# Patient Record
Sex: FEMALE | Race: WHITE | NOT HISPANIC OR LATINO
[De-identification: names, ages, dates, MRNs, and addresses within clinical notes are randomized per-mention and may not be internally consistent; named-entity substitution may affect disease eponyms.]

---

## 2017-12-14 ENCOUNTER — RESULT REVIEW (OUTPATIENT)
Age: 42
End: 2017-12-14

## 2017-12-15 ENCOUNTER — OUTPATIENT (OUTPATIENT)
Dept: OUTPATIENT SERVICES | Facility: HOSPITAL | Age: 42
LOS: 1 days | End: 2017-12-15
Payer: COMMERCIAL

## 2017-12-15 ENCOUNTER — APPOINTMENT (OUTPATIENT)
Dept: ULTRASOUND IMAGING | Facility: CLINIC | Age: 42
End: 2017-12-15
Payer: COMMERCIAL

## 2017-12-15 DIAGNOSIS — Z00.8 ENCOUNTER FOR OTHER GENERAL EXAMINATION: ICD-10-CM

## 2017-12-15 PROCEDURE — 76942 ECHO GUIDE FOR BIOPSY: CPT | Mod: 26

## 2017-12-15 PROCEDURE — 60100 BIOPSY OF THYROID: CPT

## 2017-12-15 PROCEDURE — 76942 ECHO GUIDE FOR BIOPSY: CPT

## 2017-12-15 PROCEDURE — 88173 CYTOPATH EVAL FNA REPORT: CPT | Mod: 26

## 2017-12-15 PROCEDURE — 88173 CYTOPATH EVAL FNA REPORT: CPT

## 2018-10-16 ENCOUNTER — RX RENEWAL (OUTPATIENT)
Age: 43
End: 2018-10-16

## 2018-11-12 ENCOUNTER — RECORD ABSTRACTING (OUTPATIENT)
Age: 43
End: 2018-11-12

## 2018-11-13 ENCOUNTER — APPOINTMENT (OUTPATIENT)
Dept: ENDOCRINOLOGY | Facility: CLINIC | Age: 43
End: 2018-11-13
Payer: COMMERCIAL

## 2018-11-13 VITALS
WEIGHT: 124 LBS | BODY MASS INDEX: 24.35 KG/M2 | DIASTOLIC BLOOD PRESSURE: 80 MMHG | SYSTOLIC BLOOD PRESSURE: 110 MMHG | HEART RATE: 80 BPM | HEIGHT: 60 IN

## 2018-11-13 PROCEDURE — 99214 OFFICE O/P EST MOD 30 MIN: CPT

## 2019-02-27 ENCOUNTER — APPOINTMENT (OUTPATIENT)
Dept: OBGYN | Facility: CLINIC | Age: 44
End: 2019-02-27

## 2019-04-15 ENCOUNTER — APPOINTMENT (OUTPATIENT)
Dept: OBGYN | Facility: CLINIC | Age: 44
End: 2019-04-15
Payer: COMMERCIAL

## 2019-04-15 ENCOUNTER — TRANSCRIPTION ENCOUNTER (OUTPATIENT)
Age: 44
End: 2019-04-15

## 2019-04-15 ENCOUNTER — LABORATORY RESULT (OUTPATIENT)
Age: 44
End: 2019-04-15

## 2019-04-15 VITALS
WEIGHT: 130 LBS | BODY MASS INDEX: 25.52 KG/M2 | HEIGHT: 60 IN | SYSTOLIC BLOOD PRESSURE: 116 MMHG | DIASTOLIC BLOOD PRESSURE: 70 MMHG

## 2019-04-15 DIAGNOSIS — N93.0 POSTCOITAL AND CONTACT BLEEDING: ICD-10-CM

## 2019-04-15 LAB
BILIRUB UR QL STRIP: NORMAL
GLUCOSE UR-MCNC: NORMAL
HCG UR QL: 0.2 EU/DL
HCG UR QL: NEGATIVE
HGB UR QL STRIP.AUTO: NORMAL
KETONES UR-MCNC: NORMAL
LEUKOCYTE ESTERASE UR QL STRIP: NORMAL
NITRITE UR QL STRIP: NORMAL
PH UR STRIP: 6.5
PROT UR STRIP-MCNC: NORMAL
QUALITY CONTROL: YES
SP GR UR STRIP: 1.01

## 2019-04-15 PROCEDURE — 99386 PREV VISIT NEW AGE 40-64: CPT

## 2019-04-15 PROCEDURE — 99203 OFFICE O/P NEW LOW 30 MIN: CPT | Mod: 25

## 2019-04-15 PROCEDURE — 81003 URINALYSIS AUTO W/O SCOPE: CPT | Mod: QW

## 2019-04-15 PROCEDURE — 81025 URINE PREGNANCY TEST: CPT

## 2019-04-15 NOTE — COUNSELING
[Breast Self Exam] : breast self exam [Nutrition] : nutrition [Exercise] : exercise [STD (testing, results, tx)] : STD (testing, results, tx)

## 2019-04-16 ENCOUNTER — FORM ENCOUNTER (OUTPATIENT)
Age: 44
End: 2019-04-16

## 2019-04-16 LAB
BASOPHILS # BLD AUTO: 0.06 K/UL
BASOPHILS NFR BLD AUTO: 0.3 %
C TRACH RRNA SPEC QL NAA+PROBE: NOT DETECTED
EOSINOPHIL # BLD AUTO: 0.1 K/UL
EOSINOPHIL NFR BLD AUTO: 0.6 %
ESTRADIOL SERPL-MCNC: 470 PG/ML
FSH SERPL-MCNC: 5.5 IU/L
HCT VFR BLD CALC: 42.8 %
HGB BLD-MCNC: 12.8 G/DL
HPV HIGH+LOW RISK DNA PNL CVX: NOT DETECTED
IMM GRANULOCYTES NFR BLD AUTO: 0.5 %
LH SERPL-ACNC: 9.3 IU/L
LYMPHOCYTES # BLD AUTO: 5.81 K/UL
LYMPHOCYTES NFR BLD AUTO: 33.1 %
MAN DIFF?: NORMAL
MCHC RBC-ENTMCNC: 29.3 PG
MCHC RBC-ENTMCNC: 29.9 GM/DL
MCV RBC AUTO: 97.9 FL
MONOCYTES # BLD AUTO: 0.94 K/UL
MONOCYTES NFR BLD AUTO: 5.4 %
N GONORRHOEA RRNA SPEC QL NAA+PROBE: NOT DETECTED
NEUTROPHILS # BLD AUTO: 10.53 K/UL
NEUTROPHILS NFR BLD AUTO: 60.1 %
PLATELET # BLD AUTO: 642 K/UL
PROLACTIN SERPL-MCNC: 11.8 NG/ML
RBC # BLD: 4.37 M/UL
RBC # FLD: 13.6 %
SOURCE AMPLIFICATION: NORMAL
SOURCE TP AMPLIFICATION: NORMAL
T VAGINALIS RRNA SPEC QL NAA+PROBE: NOT DETECTED
TSH SERPL-ACNC: 0.1 UIU/ML
WBC # FLD AUTO: 17.53 K/UL

## 2019-04-16 NOTE — HISTORY OF PRESENT ILLNESS
[Monogamous] : is monogamous [Definite:  ___ (Date)] : the last menstrual period was [unfilled] [Menarche Age: ____] : age at menarche was [unfilled] [Partner Vasectomy] : has a partner with a vasectomy [Last Mammogram ___] : Last Mammogram was [unfilled] [Last Bone Density ___] : Last bone density studies [unfilled] [Reproductive Age] : is of reproductive age [Last Pap ___] : Last cervical pap smear was [unfilled] [Menstrual Problems] : reports abnormal menses [Pregnancy History] : pregnancy history: [Sexually Active] : is sexually active [Contraception] : does not use contraception

## 2019-04-16 NOTE — PHYSICAL EXAM
[Awake] : awake [Alert] : alert [Soft] : soft [Oriented x3] : oriented to person, place, and time [Normal] : uterus [Polyp ___ cm] : had a [unfilled] ~Ucm polyp [Uterine Adnexae] : were not tender and not enlarged [Mass] : no breast mass [Axillary LAD] : no axillary lymphadenopathy [Nipple Discharge] : no nipple discharge [Tender] : non tender [Distended] : not distended [Tenderness] : nontender [Motion Tenderness] : there was no cervical motion tenderness [Enlarged ___ wks] : not enlarged [Mass ___ cm] : no uterine mass was palpated [Adnexa Tenderness] : were not tender [Ovarian Mass (___ Cm)] : there were no adnexal masses

## 2019-04-16 NOTE — DISCUSSION/SUMMARY
[FreeTextEntry1] : 1. polyp seen on exam.  she will have a pelvic sono and return for discussion of results.  We discussed she will likely need a hysteroscopy and endometrial evaluation/will discuss pending the results of the sonogram. \par 2. hormone levels drawn\par 3. mammogram rx given\par \par

## 2019-04-17 ENCOUNTER — OUTPATIENT (OUTPATIENT)
Dept: OUTPATIENT SERVICES | Facility: HOSPITAL | Age: 44
LOS: 1 days | End: 2019-04-17
Payer: COMMERCIAL

## 2019-04-17 ENCOUNTER — APPOINTMENT (OUTPATIENT)
Dept: ULTRASOUND IMAGING | Facility: CLINIC | Age: 44
End: 2019-04-17
Payer: COMMERCIAL

## 2019-04-17 ENCOUNTER — APPOINTMENT (OUTPATIENT)
Dept: MAMMOGRAPHY | Facility: CLINIC | Age: 44
End: 2019-04-17
Payer: COMMERCIAL

## 2019-04-17 DIAGNOSIS — E04.2 NONTOXIC MULTINODULAR GOITER: ICD-10-CM

## 2019-04-17 DIAGNOSIS — Z12.31 ENCOUNTER FOR SCREENING MAMMOGRAM FOR MALIGNANT NEOPLASM OF BREAST: ICD-10-CM

## 2019-04-17 PROCEDURE — 77067 SCR MAMMO BI INCL CAD: CPT | Mod: 26

## 2019-04-17 PROCEDURE — 77067 SCR MAMMO BI INCL CAD: CPT

## 2019-04-17 PROCEDURE — 77063 BREAST TOMOSYNTHESIS BI: CPT | Mod: 26

## 2019-04-17 PROCEDURE — 77063 BREAST TOMOSYNTHESIS BI: CPT

## 2019-04-17 PROCEDURE — 76536 US EXAM OF HEAD AND NECK: CPT | Mod: 26

## 2019-04-17 PROCEDURE — 76536 US EXAM OF HEAD AND NECK: CPT

## 2019-04-25 LAB — CYTOLOGY CVX/VAG DOC THIN PREP: NORMAL

## 2019-05-06 ENCOUNTER — APPOINTMENT (OUTPATIENT)
Dept: ENDOCRINOLOGY | Facility: CLINIC | Age: 44
End: 2019-05-06
Payer: COMMERCIAL

## 2019-05-06 VITALS
BODY MASS INDEX: 27.29 KG/M2 | WEIGHT: 139 LBS | DIASTOLIC BLOOD PRESSURE: 68 MMHG | HEIGHT: 60 IN | SYSTOLIC BLOOD PRESSURE: 100 MMHG | HEART RATE: 65 BPM

## 2019-05-06 PROCEDURE — 99214 OFFICE O/P EST MOD 30 MIN: CPT

## 2019-05-06 RX ORDER — LEVOTHYROXINE SODIUM 0.11 MG/1
112 TABLET ORAL DAILY
Qty: 90 | Refills: 1 | Status: DISCONTINUED | COMMUNITY
Start: 2018-11-13 | End: 2019-05-06

## 2019-05-06 NOTE — ASSESSMENT
[Levothyroxine] : The patient was instructed to take Levothyroxine on an empty stomach, separate from vitamins, and wait at least 30 minutes before eating [FreeTextEntry1] : HYpoT /autoimmune thyroiditis \par Tsh suppressed. decrease Lt4 dose to 100 mcg qd \par repeat TFts in 3 mos \par Tg ab positive \par \par NTMNG \par FNA nondiagnostic repeatedly of L dominant nodule bc heavily calcified. \par repeat US in may 2019 showed dominant L nodule stable. \par will monitor and repeat US in May 2020\par \par vitamin d defic: continue supplements. \par check 25OHD level next appt. \par extensively discussed importance of Vitamin d in metabolism of calcium and bone disease \par \par overweighT; discussed diet and exercise\par encouraged more exercise walking 30 min 3 x week\par

## 2019-05-06 NOTE — PHYSICAL EXAM
[No Acute Distress] : no acute distress [Alert] : alert [Well Developed] : well developed [Normal Sclera/Conjunctiva] : normal sclera/conjunctiva [Well Nourished] : well nourished [EOMI] : extra ocular movement intact [No Proptosis] : no proptosis [Normal Oropharynx] : the oropharynx was normal [No Thyroid Nodules] : there were no palpable thyroid nodules [No Respiratory Distress] : no respiratory distress [Thyroid Not Enlarged] : the thyroid was not enlarged [Clear to Auscultation] : lungs were clear to auscultation bilaterally [No Accessory Muscle Use] : no accessory muscle use [Normal S1, S2] : normal S1 and S2 [Normal Rate] : heart rate was normal  [Pedal Pulses Normal] : the pedal pulses are present [Regular Rhythm] : with a regular rhythm [No Edema] : there was no peripheral edema [Not Tender] : non-tender [Normal Bowel Sounds] : normal bowel sounds [Soft] : abdomen soft [Not Distended] : not distended [Post Cervical Nodes] : posterior cervical nodes [Anterior Cervical Nodes] : anterior cervical nodes [Normal] : normal and non tender [No Stigmata of Cushings Syndrome] : no stigmata of cushings syndrome [No Rash] : no rash [Normal Gait] : normal gait [Normal Strength/Tone] : muscle strength and tone were normal [Normal Reflexes] : deep tendon reflexes were 2+ and symmetric [Oriented x3] : oriented to person, place, and time [No Tremors] : no tremors [Acanthosis Nigricans] : no acanthosis nigricans

## 2019-05-06 NOTE — HISTORY OF PRESENT ILLNESS
[FreeTextEntry1] : hypoT since 2008 and NTMNG \par treated for autoimmune hepatitis. She had 5 episodes of pancreatitis.\par she needs genetic testing for CF but not covered by insurance \par no family h/o thyroid cancer, no neck XRT\par chronic steroids, no previous fracture\par takes chronically butesonide 9 mg qd

## 2019-05-07 ENCOUNTER — ASOB RESULT (OUTPATIENT)
Age: 44
End: 2019-05-07

## 2019-05-07 ENCOUNTER — APPOINTMENT (OUTPATIENT)
Dept: OBGYN | Facility: CLINIC | Age: 44
End: 2019-05-07
Payer: COMMERCIAL

## 2019-05-07 PROCEDURE — 76856 US EXAM PELVIC COMPLETE: CPT | Mod: 59

## 2019-05-07 PROCEDURE — 76830 TRANSVAGINAL US NON-OB: CPT

## 2019-05-14 ENCOUNTER — APPOINTMENT (OUTPATIENT)
Dept: OBGYN | Facility: CLINIC | Age: 44
End: 2019-05-14
Payer: COMMERCIAL

## 2019-05-14 VITALS
DIASTOLIC BLOOD PRESSURE: 70 MMHG | WEIGHT: 135 LBS | BODY MASS INDEX: 26.5 KG/M2 | HEIGHT: 60 IN | SYSTOLIC BLOOD PRESSURE: 118 MMHG

## 2019-05-14 DIAGNOSIS — N83.201 UNSPECIFIED OVARIAN CYST, RIGHT SIDE: ICD-10-CM

## 2019-05-14 DIAGNOSIS — N92.6 IRREGULAR MENSTRUATION, UNSPECIFIED: ICD-10-CM

## 2019-05-14 DIAGNOSIS — N83.202 UNSPECIFIED OVARIAN CYST, RIGHT SIDE: ICD-10-CM

## 2019-05-14 LAB
BILIRUB UR QL STRIP: NORMAL
CLARITY UR: CLEAR
COLLECTION METHOD: NORMAL
GLUCOSE UR-MCNC: NORMAL
HCG UR QL: 0.2 EU/DL
HGB UR QL STRIP.AUTO: ABNORMAL
KETONES UR-MCNC: NORMAL
LEUKOCYTE ESTERASE UR QL STRIP: NORMAL
NITRITE UR QL STRIP: NORMAL
PH UR STRIP: 6
PROT UR STRIP-MCNC: NORMAL
SP GR UR STRIP: 1.01

## 2019-05-14 PROCEDURE — 81003 URINALYSIS AUTO W/O SCOPE: CPT | Mod: QW

## 2019-05-14 PROCEDURE — 99213 OFFICE O/P EST LOW 20 MIN: CPT

## 2019-05-14 NOTE — HISTORY OF PRESENT ILLNESS
[Current] : cancer screening reviewed and current [Last Pap Smear ___] : last Papanicolaou cytology done [unfilled] [Last Pap ___] : Last cervical pap smear was [unfilled] [Last Mammogram ___] : last mammogram done [unfilled] [Definite:  ___ (Date)] : the last menstrual period was [unfilled] [Menarche Age: ____] : age at menarche was [unfilled] [Sexually Active] : is sexually active [Monogamous] : is monogamous [Contraception] : uses contraception [Partner Vasectomy] : has a partner with a vasectomy [Male ___] : [unfilled] male

## 2019-05-14 NOTE — DISCUSSION/SUMMARY
[FreeTextEntry1] : We again reviewed differentials for her irregular bleeding.  Her synthroid dose was recently adjusted, and this might have been the cause of the frequent bleeding, however she still requires further evaluation.\par \par She will f/u in 4-6 weeks for a repeat pelvic US for ovarian cyst surveillance and see MD the same day for results of the sonogram and for evaluation of her cervical polyp. She will keep track of her bleeding patterns between now and her next visit and see if she returns to her regular cycle length.  She is aware she may still require an endometrial evaluation with hysteroscopy and biopsy- she will discuss with MD at follow up visit.\par \par

## 2019-07-12 ENCOUNTER — APPOINTMENT (OUTPATIENT)
Dept: OBGYN | Facility: CLINIC | Age: 44
End: 2019-07-12

## 2019-10-14 ENCOUNTER — RX RENEWAL (OUTPATIENT)
Age: 44
End: 2019-10-14

## 2019-11-19 ENCOUNTER — APPOINTMENT (OUTPATIENT)
Dept: ENDOCRINOLOGY | Facility: CLINIC | Age: 44
End: 2019-11-19

## 2020-07-17 ENCOUNTER — LABORATORY RESULT (OUTPATIENT)
Age: 45
End: 2020-07-17

## 2020-07-20 ENCOUNTER — RX RENEWAL (OUTPATIENT)
Age: 45
End: 2020-07-20

## 2020-07-20 LAB
25(OH)D3 SERPL-MCNC: 29.2 NG/ML
ALBUMIN SERPL ELPH-MCNC: 4.7 G/DL
ALP BLD-CCNC: 83 U/L
ALT SERPL-CCNC: 15 U/L
ANION GAP SERPL CALC-SCNC: 16 MMOL/L
AST SERPL-CCNC: 16 U/L
BASOPHILS # BLD AUTO: 0.07 K/UL
BASOPHILS NFR BLD AUTO: 0.5 %
BILIRUB SERPL-MCNC: 0.6 MG/DL
BUN SERPL-MCNC: 16 MG/DL
CALCIUM SERPL-MCNC: 9.8 MG/DL
CHLORIDE SERPL-SCNC: 96 MMOL/L
CO2 SERPL-SCNC: 23 MMOL/L
CREAT SERPL-MCNC: 1.29 MG/DL
EOSINOPHIL # BLD AUTO: 0.11 K/UL
EOSINOPHIL NFR BLD AUTO: 0.8 %
GLUCOSE SERPL-MCNC: 85 MG/DL
HCT VFR BLD CALC: 44.8 %
HGB BLD-MCNC: 14 G/DL
IMM GRANULOCYTES NFR BLD AUTO: 0.6 %
LYMPHOCYTES # BLD AUTO: 6.56 K/UL
LYMPHOCYTES NFR BLD AUTO: 47.9 %
MAN DIFF?: NORMAL
MCHC RBC-ENTMCNC: 29.2 PG
MCHC RBC-ENTMCNC: 31.3 GM/DL
MCV RBC AUTO: 93.3 FL
MONOCYTES # BLD AUTO: 0.96 K/UL
MONOCYTES NFR BLD AUTO: 7 %
NEUTROPHILS # BLD AUTO: 5.91 K/UL
NEUTROPHILS NFR BLD AUTO: 43.2 %
PLATELET # BLD AUTO: 606 K/UL
POTASSIUM SERPL-SCNC: 4.9 MMOL/L
PROT SERPL-MCNC: 7.3 G/DL
RBC # BLD: 4.8 M/UL
RBC # FLD: 14.5 %
SODIUM SERPL-SCNC: 134 MMOL/L
T3 SERPL-MCNC: 104 NG/DL
T4 FREE SERPL-MCNC: 1.6 NG/DL
TSH SERPL-ACNC: 2.51 UIU/ML
WBC # FLD AUTO: 13.69 K/UL

## 2020-07-24 ENCOUNTER — APPOINTMENT (OUTPATIENT)
Dept: ENDOCRINOLOGY | Facility: CLINIC | Age: 45
End: 2020-07-24
Payer: COMMERCIAL

## 2020-07-24 VITALS
TEMPERATURE: 98.1 F | OXYGEN SATURATION: 99 % | HEIGHT: 61 IN | WEIGHT: 151 LBS | DIASTOLIC BLOOD PRESSURE: 80 MMHG | BODY MASS INDEX: 28.51 KG/M2 | RESPIRATION RATE: 16 BRPM | SYSTOLIC BLOOD PRESSURE: 120 MMHG | HEART RATE: 75 BPM

## 2020-07-24 PROCEDURE — 99214 OFFICE O/P EST MOD 30 MIN: CPT

## 2020-07-24 NOTE — HISTORY OF PRESENT ILLNESS
[FreeTextEntry1] : hypoT since 2008 and NTMNG \par treated for autoimmune hepatitis. She had 5 episodes of pancreatitis.\par she needs genetic testing for CF but not covered by insurance \par chronic steroids, no previous fracture\par takes chronically butesonide 3 mg qd

## 2020-07-24 NOTE — PHYSICAL EXAM
[Alert] : alert [Well Nourished] : well nourished [Normal Sclera/Conjunctiva] : normal sclera/conjunctiva [Well Developed] : well developed [No Acute Distress] : no acute distress [No Proptosis] : no proptosis [EOMI] : extra ocular movement intact [Normal Oropharynx] : the oropharynx was normal [Thyroid Not Enlarged] : the thyroid was not enlarged [No Respiratory Distress] : no respiratory distress [No Thyroid Nodules] : no palpable thyroid nodules [Normal S1, S2] : normal S1 and S2 [Clear to Auscultation] : lungs were clear to auscultation bilaterally [No Accessory Muscle Use] : no accessory muscle use [Normal Rate] : heart rate was normal [No Edema] : no peripheral edema [Regular Rhythm] : with a regular rhythm [Pedal Pulses Normal] : the pedal pulses are present [Normal Bowel Sounds] : normal bowel sounds [Soft] : abdomen soft [Not Distended] : not distended [Not Tender] : non-tender [Normal Anterior Cervical Nodes] : no anterior cervical lymphadenopathy [Normal Posterior Cervical Nodes] : no posterior cervical lymphadenopathy [Normal Gait] : normal gait [Spine Straight] : spine straight [No Rash] : no rash [No Tremors] : no tremors [Oriented x3] : oriented to person, place, and time [Acanthosis Nigricans] : no acanthosis nigricans

## 2020-07-24 NOTE — ASSESSMENT
[Importance of Diet and Exercise] : importance of diet and exercise to improve glycemic control, achieve weight loss and improve cardiovascular health [FreeTextEntry1] : HYpoT /autoimmune thyroiditis \par pt euthyroid clinically and biochemically.\par continue Lt4 dose to 100 mcg qd \par Take daily in AM on an empty stomach at least 30 minutes before eating or taking other medication.\par \par NTMNG \par FNA nondiagnostic repeatedly of L dominant nodule bc heavily calcified. \par will monitor and repeat US now to evaluate nodules for growth.\par no dysphagia, no dysphonia, no neck pain, no voice change\par \par vitamin d defic: continue supplements. \par \par overweighT: gained 16 lbs due to overeating. Advised to start low carb diet and start one change in her diet weekly to get adjusted. Encouraged more exercise walking 30 min 3 x week\par target weight loss 6 lbs in the next 6 mos \par  [Exercise/Effect on Glucose] : exercise/effect on glucose

## 2020-10-15 ENCOUNTER — RX RENEWAL (OUTPATIENT)
Age: 45
End: 2020-10-15

## 2020-11-10 ENCOUNTER — APPOINTMENT (OUTPATIENT)
Dept: HEPATOLOGY | Facility: CLINIC | Age: 45
End: 2020-11-10
Payer: COMMERCIAL

## 2020-11-10 VITALS
OXYGEN SATURATION: 97 % | TEMPERATURE: 97.8 F | BODY MASS INDEX: 26.06 KG/M2 | HEIGHT: 61 IN | SYSTOLIC BLOOD PRESSURE: 123 MMHG | DIASTOLIC BLOOD PRESSURE: 81 MMHG | HEART RATE: 68 BPM | WEIGHT: 138 LBS | RESPIRATION RATE: 14 BRPM

## 2020-11-10 DIAGNOSIS — Z14.1 CYSTIC FIBROSIS CARRIER: ICD-10-CM

## 2020-11-10 PROCEDURE — 99072 ADDL SUPL MATRL&STAF TM PHE: CPT

## 2020-11-10 PROCEDURE — 99243 OFF/OP CNSLTJ NEW/EST LOW 30: CPT

## 2020-11-11 ENCOUNTER — LABORATORY RESULT (OUTPATIENT)
Age: 45
End: 2020-11-11

## 2020-11-11 NOTE — PHYSICAL EXAM
[General Appearance - Alert] : alert [General Appearance - In No Acute Distress] : in no acute distress [Sclera] : the sclera and conjunctiva were normal [PERRL With Normal Accommodation] : pupils were equal in size, round, and reactive to light [Extraocular Movements] : extraocular movements were intact [Outer Ear] : the ears and nose were normal in appearance [Oropharynx] : the oropharynx was normal [Neck Appearance] : the appearance of the neck was normal [Neck Cervical Mass (___cm)] : no neck mass was observed [Jugular Venous Distention Increased] : there was no jugular-venous distention [Thyroid Diffuse Enlargement] : the thyroid was not enlarged [Thyroid Nodule] : there were no palpable thyroid nodules [Auscultation Breath Sounds / Voice Sounds] : lungs were clear to auscultation bilaterally [Heart Rate And Rhythm] : heart rate was normal and rhythm regular [Heart Sounds] : normal S1 and S2 [Heart Sounds Gallop] : no gallops [Murmurs] : no murmurs [Heart Sounds Pericardial Friction Rub] : no pericardial rub [Full Pulse] : the pedal pulses are present [Edema] : there was no peripheral edema [Bowel Sounds] : normal bowel sounds [Abdomen Soft] : soft [Abdomen Tenderness] : non-tender [Abdomen Mass (___ Cm)] : no abdominal mass palpated [Cervical Lymph Nodes Enlarged Posterior Bilaterally] : posterior cervical [Cervical Lymph Nodes Enlarged Anterior Bilaterally] : anterior cervical [Supraclavicular Lymph Nodes Enlarged Bilaterally] : supraclavicular [Axillary Lymph Nodes Enlarged Bilaterally] : axillary [Femoral Lymph Nodes Enlarged Bilaterally] : femoral [Inguinal Lymph Nodes Enlarged Bilaterally] : inguinal [No CVA Tenderness] : no ~M costovertebral angle tenderness [No Spinal Tenderness] : no spinal tenderness [Abnormal Walk] : normal gait [Nail Clubbing] : no clubbing  or cyanosis of the fingernails [Musculoskeletal - Swelling] : no joint swelling seen [Motor Tone] : muscle strength and tone were normal [Skin Color & Pigmentation] : normal skin color and pigmentation [Skin Turgor] : normal skin turgor [] : no rash [Deep Tendon Reflexes (DTR)] : deep tendon reflexes were 2+ and symmetric [Sensation] : the sensory exam was normal to light touch and pinprick [No Focal Deficits] : no focal deficits [Oriented To Time, Place, And Person] : oriented to person, place, and time [Impaired Insight] : insight and judgment were intact [Affect] : the affect was normal [Abdominal  Ascites] : no ascites [Splenomegaly] : no splenomegaly [Liver Palpable ___ Finger Breadths Below Costal Margin] : was not palpable below costal margin [Asterixis] : no asterixis observed [Jaundice] : No jaundice [FreeTextEntry1] : overweight

## 2020-11-11 NOTE — ASSESSMENT
[FreeTextEntry1] : 45 yoF, cystic fibrosis carrier, h/o acute pancreatitides (about 8-10 since age 10), hypothyroidism, osteoporosis, h/o gastritis (hospitalized 2/2020), CCY, and AIH diagnosed in 2016 by her gastroenterologist at the time,  \lashell Hood, at the time with bilirubin 7.2, AST//763, ALP, normal IgG 759, pos. anti-actin 69 U, MELISSA+,  based on a liver biopsy. \par \par - AIH, typical findings on liver biopsy (scanned) in 2016, at the time, MELISSA, ASMA positive\par   - initially treated with prednisone for 6 months by Dr. Hood, then with budesonide 3 mg/d by Dr. Andra Morton at [facility] until 8/2020\par   - likely intolerant of azathioprine - acute pancreatitis after initiation\par - persistent nausea and vomiting about a week later that lasted for more than three weeks, and RUQ pain, and extreme exhaustion and fatigue. Her gastroenterologist found elevated AST/ALT 87/78, K 4.3, Na 136, Creat 1.33, BUN 20, and resumed budesonide 9 mg/d on 9/29/20. She rapidly improved and has been feeling normally recently. Recent LFTs were normal as below. \par - coronary varices, portal HTN\par \par - longstanding h/o being overweight, BMI 26.1 after 10 lbs weight loss in 6 weeks by dieting\par \par I discussed that AIH may relapse and require long-term treatment. Her symptoms after budesonide was stopped of prominent nausea and vomiting and fatigue and relatively lowly elevated AST/ALT are not quite typical of relapse. However, her RUQ pain, resolution of symptoms and normalization of LFTs support a flare. K and Na were normal, arguing against Addisonian crisis.\par I discussed that we can try to lower the dose of budesonide, and that a repeat liver biopsy will likely be necessary before another attempt to stop steroids. This can be done once LFTs have been normal for several months. Will also consider addition of MMF.\par \par She follows with Dr. Hood's colleague.\par \par Plan:\par - labs as below. Pt. will call on Monday to discuss\par - return in 1 month after repeat labs\par - portal HTN: agree with planned EGD \par - decrease budesonide: 6 mg/d for 2 weeks, then 3 mg/d after that

## 2020-11-11 NOTE — HISTORY OF PRESENT ILLNESS
[Autoimmune Disorder] : autoimmune disorder [Needlestick Exposure] : no needlestick exposure [Infected Sexual Partner] : no infected sexual partner [IV Drug Use] : no IV drug use [Tattoo] : no tattoos [Body Piercing] : no body piercing [Hemodialysis] : no hemodialysis [Transfusion before 1992] : no transfusion before 1992 [Transplant before 1992] : no transplant before 1992 [Alcohol Abuse] : no alcohol abuse [Household Contact to HBV] : no household contact to HBV [Travel to Endemic Area] : no travel to an endemic area [Occupational Exposure] : no occupational exposure [Cocaine Use] : no cocaine use [de-identified] : Ms. HORNE is a 45 year old woman with h/o acute pancreatitides (about 8-10 since age 10), hypothyroidism, osteoporosis, h/o gastritis (hospitalized 2/2020), CCY, who  was diagnosed with AIH by her gastroenterologist at the time, Dr. Hood, after she developed dark urine, jaundice, fatigue and some vomiting, and was found to have elevated LFTs with bilirubin 7.2, AST//763, ALP, pos. anti-actin 69 U, MELISSA+,  based on a liver biopsy. She saw Dr. Naranjo at our office for a second opinion after she had developed side-effects to treatment with prednisone, initially 60 mg/d. LFTs had improved. Dr. Naranjo ordered bloodwork, planned to review the liver biopsy, and agreed with tapering prednisone, and recommended the addition of azathioprine. \par The patient did not follow-up at that time. Labs form 9/2016 show AST/ALT mildly elevated, and negative MELISSA, ASMA and normal IgG of 759. She was treated with prednisone for 6 months, then followed with Dr. Andra Morton at [facility] and wishes to transfer care here as it is closer to her home.\par She developed acute pancreatitis to azathioprine and was treated with budesonide 3 mg/d until 8/2020. She developed persistent nausea and vomiting about a week later that lasted for more than three weeks, and RUQ pain, and extreme exhaustion and fatigue. Her gastroenterologist found elevated AST/ALT 87/78, K 4.3, Na 136, Creat 1.33, BUN 20, and resumed budesonide 9 mg/d on 9/29/20. She rapidly improved and has been feeling normally recently. Recent LFTs were normal as below. \par \par Weight hx: 138 lbs, BMI 26.1 after 10 lbs weight loss in 6 weeks by dieting. Max. weight was 148 lbs. Longstanding overweight 130-140 lbs.\par Alcohol hx: does not drink, never did, because of her pancreatitides.\par \par Workup: \par - 10/12/20 MRI abdomen wwo ([facility]): normal liver morphology. Vessels patent. Coronary varices, suggestive of portal HTN.\par - 8/4/16 liver biopsy ([facility]; scanned): moderate to marked inflammatory infiltrate involving portal areas with interface activity and extends into lobules. [...] Lymphocytes, plasma cells, eosinophils, some neutrophils., including rare neutrophils in small bile ducts. Lobular cholestasis is also present. No evidence of fibrosis. [...]not entirely characteristic, given pos. MELISSA, AMA, compatible with AIH.

## 2020-11-18 ENCOUNTER — NON-APPOINTMENT (OUTPATIENT)
Age: 45
End: 2020-11-18

## 2021-01-07 ENCOUNTER — RX RENEWAL (OUTPATIENT)
Age: 46
End: 2021-01-07

## 2021-01-14 ENCOUNTER — APPOINTMENT (OUTPATIENT)
Dept: HEPATOLOGY | Facility: CLINIC | Age: 46
End: 2021-01-14
Payer: COMMERCIAL

## 2021-01-14 VITALS
TEMPERATURE: 97.6 F | WEIGHT: 141.13 LBS | OXYGEN SATURATION: 97 % | SYSTOLIC BLOOD PRESSURE: 129 MMHG | DIASTOLIC BLOOD PRESSURE: 88 MMHG | HEIGHT: 61 IN | BODY MASS INDEX: 26.65 KG/M2 | RESPIRATION RATE: 14 BRPM | HEART RATE: 75 BPM

## 2021-01-14 PROCEDURE — 99214 OFFICE O/P EST MOD 30 MIN: CPT

## 2021-01-14 PROCEDURE — 99072 ADDL SUPL MATRL&STAF TM PHE: CPT

## 2021-01-14 NOTE — CONSULT LETTER
[Dear  ___] : Dear  [unfilled], [Consult Letter:] : I had the pleasure of evaluating your patient, [unfilled]. [Please see my note below.] : Please see my note below. [Consult Closing:] : Thank you very much for allowing me to participate in the care of this patient.  If you have any questions, please do not hesitate to contact me. [FreeTextEntry2] : NICKY MALHOTRA (PCP)\par  [FreeTextEntry3] : Waldemar Gaming MD\par , [facility]\par General Hepatology and Gastroenterology\par [facility]\par [facility]\par [address] [place] [address]\[place] [address]\par [phone]\par

## 2021-01-14 NOTE — HISTORY OF PRESENT ILLNESS
[Autoimmune Disorder] : autoimmune disorder [Needlestick Exposure] : no needlestick exposure [Infected Sexual Partner] : no infected sexual partner [IV Drug Use] : no IV drug use [Tattoo] : no tattoos [Body Piercing] : no body piercing [Hemodialysis] : no hemodialysis [Transfusion before 1992] : no transfusion before 1992 [Transplant before 1992] : no transplant before 1992 [Alcohol Abuse] : no alcohol abuse [Household Contact to HBV] : no household contact to HBV [Travel to Endemic Area] : no travel to an endemic area [Occupational Exposure] : no occupational exposure [Cocaine Use] : no cocaine use [de-identified] : - 1/14/21: returns after 2 months. Recent bloodwork not done. She postponed the EGD close to her home until after her 2nd COVID shot.\par \par - Ms. HORNE is a 45 year old woman with h/o acute pancreatitides (about 8-10 since age 10), hypothyroidism, osteoporosis, h/o gastritis (hospitalized 2/2020), CCY, who  was diagnosed with AIH by her gastroenterologist at the time, Dr. oHod, after she developed dark urine, jaundice, fatigue and some vomiting, and was found to have elevated LFTs with bilirubin 7.2, AST//763, ALP, pos. anti-actin 69 U, MELISSA+,  based on a liver biopsy. She saw Dr. Naranjo at our office for a second opinion after she had developed side-effects to treatment with prednisone, initially 60 mg/d. LFTs had improved. Dr. Naranjo ordered bloodwork, planned to review the liver biopsy, and agreed with tapering prednisone, and recommended the addition of azathioprine. \par The patient did not follow-up at that time. Labs form 9/2016 show AST/ALT mildly elevated, and negative MELISSA, ASMA and normal IgG of 759. She was treated with prednisone for 6 months, then followed with Dr. Andra Morton at [facility] and wishes to transfer care here as it is closer to her home.\par She developed acute pancreatitis to azathioprine and was treated with budesonide 3 mg/d until 8/2020. She developed persistent nausea and vomiting about a week later that lasted for more than three weeks, and RUQ pain, and extreme exhaustion and fatigue. Her gastroenterologist found elevated AST/ALT 87/78, K 4.3, Na 136, Creat 1.33, BUN 20, and resumed budesonide 9 mg/d on 9/29/20. She rapidly improved and has been feeling normally recently. Recent LFTs were normal as below. \par \par Weight hx: 138 lbs, BMI 26.1 after 10 lbs weight loss in 6 weeks by dieting. Max. weight was 148 lbs. Longstanding overweight 130-140 lbs.\par Alcohol hx: does not drink, never did, because of her pancreatitides.\par \par Workup: \par - 10/12/20 MRI abdomen wwo ([facility]): normal liver morphology. Vessels patent. Coronary varices, suggestive of portal HTN.\par - 8/4/16 liver biopsy ([facility]; scanned): moderate to marked inflammatory infiltrate involving portal areas with interface activity and extends into lobules. [...] Lymphocytes, plasma cells, eosinophils, some neutrophils., including rare neutrophils in small bile ducts. Lobular cholestasis is also present. No evidence of fibrosis. [...]not entirely characteristic, given pos. MELISSA, AMA, compatible with AIH.

## 2021-01-14 NOTE — ASSESSMENT
[FreeTextEntry1] : 45 yoF, cystic fibrosis carrier, h/o acute pancreatitides (about 8-10 since age 10), hypothyroidism, osteoporosis, h/o gastritis (hospitalized 2/2020), CCY, and AIH diagnosed in 2016 by her gastroenterologist at the time, Dr. bernadine Hood, at the time with bilirubin 7.2, AST//763, ALP, normal IgG 759, pos. anti-actin 69 U, MELISSA+,  based on a liver biopsy. \par \par - AIH, typical findings on liver biopsy (scanned) in 2016, at the time, MELISSA, ASMA positive\par   - initially treated with prednisone for 6 months by Dr. Hood, then with budesonide 3 mg/d by Dr. Andra Morton at [facility] until 8/2020\par - mild flare 9/2020 three weeks after budesonide stopped with recurrence of initial symptoms of nausea, vomiting,  RUQ pain, extreme exhaustion/fatigue, and mild AST/ALT 87/78 elevation found by Dr. Hood, also creat 1.33. Symptoms rapidly improved after resumption of budesonide 9 mg/d on 9/29/20, LFTs normalized. Budesonide tapered to 3 mg/d after 11/10/20 visit, doing well.\par - abdominal varices seen on MRI 10/2020; liver non-cirrhotic appearing. Need further evaluation of liver fibrosis.\par - likely intolerant of azathioprine - acute pancreatitis after initiation; less likely recurrent pancreatitis of other cause\par - longstanding h/o being overweight, BMI 26.1 after 10 lbs weight loss in 6 weeks by dieting\par \par - PAZ, creat 1.33 in Sep 2020, improved to 1.11, eGFR 61 - suggestive of CKD2. Mother had one atrophic kidney. No HTN. No h/o significant NSAID intake.\par \par - immune to hepatitis B due to vaccination, not immune to hepatitis A\par \par She follows with Dr. Hood's colleague.\par \par Plan:\par - LFTs today. PT. will call two days later. \par - portal HTN: agree with planned EGD \par - fibroscan. May consider repeat biopsy depending on this and findings of EGD to evalutate for non-cirrhotic portal HTN\par - CKD2: UA\par - continue budesonide 3 mg/d for now. May consider adding MMF longterm.\par - recommend hepatitis A vaccination by her PCP

## 2021-01-16 ENCOUNTER — LABORATORY RESULT (OUTPATIENT)
Age: 46
End: 2021-01-16

## 2021-01-19 ENCOUNTER — APPOINTMENT (OUTPATIENT)
Dept: HEPATOLOGY | Facility: CLINIC | Age: 46
End: 2021-01-19
Payer: COMMERCIAL

## 2021-01-19 LAB
ALBUMIN SERPL ELPH-MCNC: 4.5 G/DL
ALP BLD-CCNC: 76 U/L
ALT SERPL-CCNC: 29 U/L
ANION GAP SERPL CALC-SCNC: 14 MMOL/L
APPEARANCE: ABNORMAL
AST SERPL-CCNC: 24 U/L
BASOPHILS # BLD AUTO: 0.12 K/UL
BASOPHILS NFR BLD AUTO: 0.9 %
BILIRUB SERPL-MCNC: 1.6 MG/DL
BILIRUBIN URINE: NEGATIVE
BLOOD URINE: NEGATIVE
BUN SERPL-MCNC: 13 MG/DL
CALCIUM SERPL-MCNC: 9.6 MG/DL
CHLORIDE SERPL-SCNC: 100 MMOL/L
CHOLEST SERPL-MCNC: 228 MG/DL
CO2 SERPL-SCNC: 23 MMOL/L
COLOR: NORMAL
CREAT SERPL-MCNC: 1.31 MG/DL
EOSINOPHIL # BLD AUTO: 0.12 K/UL
EOSINOPHIL NFR BLD AUTO: 0.9 %
GLUCOSE QUALITATIVE U: NEGATIVE
GLUCOSE SERPL-MCNC: 62 MG/DL
HCT VFR BLD CALC: 44.4 %
HDLC SERPL-MCNC: 64 MG/DL
HGB BLD-MCNC: 14.2 G/DL
IGG SER QL IEP: 1000 MG/DL
INR PPP: 0.93 RATIO
KETONES URINE: NEGATIVE
LDLC SERPL CALC-MCNC: 134 MG/DL
LEUKOCYTE ESTERASE URINE: ABNORMAL
LYMPHOCYTES # BLD AUTO: 5.63 K/UL
LYMPHOCYTES NFR BLD AUTO: 42.6 %
MAN DIFF?: NORMAL
MCHC RBC-ENTMCNC: 30.5 PG
MCHC RBC-ENTMCNC: 32 GM/DL
MCV RBC AUTO: 95.5 FL
MONOCYTES # BLD AUTO: 0.69 K/UL
MONOCYTES NFR BLD AUTO: 5.2 %
NEUTROPHILS # BLD AUTO: 5.97 K/UL
NEUTROPHILS NFR BLD AUTO: 45.2 %
NITRITE URINE: NEGATIVE
NONHDLC SERPL-MCNC: 164 MG/DL
PH URINE: 6.5
PLATELET # BLD AUTO: 528 K/UL
POTASSIUM SERPL-SCNC: 4.5 MMOL/L
PROT SERPL-MCNC: 7.1 G/DL
PROTEIN URINE: NORMAL
PT BLD: 11.1 SEC
RBC # BLD: 4.65 M/UL
RBC # FLD: 13.9 %
SODIUM SERPL-SCNC: 137 MMOL/L
SPECIFIC GRAVITY URINE: 1.02
TRIGL SERPL-MCNC: 147 MG/DL
UROBILINOGEN URINE: NORMAL
WBC # FLD AUTO: 13.21 K/UL

## 2021-01-19 PROCEDURE — 99072 ADDL SUPL MATRL&STAF TM PHE: CPT

## 2021-01-19 PROCEDURE — 91200 LIVER ELASTOGRAPHY: CPT

## 2021-01-22 ENCOUNTER — LABORATORY RESULT (OUTPATIENT)
Age: 46
End: 2021-01-22

## 2021-01-22 ENCOUNTER — NON-APPOINTMENT (OUTPATIENT)
Age: 46
End: 2021-01-22

## 2021-01-28 ENCOUNTER — APPOINTMENT (OUTPATIENT)
Dept: ENDOCRINOLOGY | Facility: CLINIC | Age: 46
End: 2021-01-28
Payer: COMMERCIAL

## 2021-01-28 VITALS
SYSTOLIC BLOOD PRESSURE: 102 MMHG | WEIGHT: 143 LBS | HEIGHT: 61 IN | BODY MASS INDEX: 27 KG/M2 | DIASTOLIC BLOOD PRESSURE: 60 MMHG | TEMPERATURE: 96.3 F

## 2021-01-28 PROCEDURE — 99214 OFFICE O/P EST MOD 30 MIN: CPT

## 2021-01-28 PROCEDURE — 99072 ADDL SUPL MATRL&STAF TM PHE: CPT

## 2021-01-28 NOTE — ASSESSMENT
[Importance of Diet and Exercise] : importance of diet and exercise to improve glycemic control, achieve weight loss and improve cardiovascular health [Exercise/Effect on Glucose] : exercise/effect on glucose [FreeTextEntry1] : HYpoT /autoimmune thyroiditis \par all lab results reviewed and discussed with patient. All questions answered\par TSh above target .\par increase Lt4 100 mcg qd and take 1.5 pill on Sundays  \par Take daily in AM on an empty stomach at least 30 minutes before eating or taking other medication.\par \par NTMNG : no compressive sx like dysphagia or change in voice \par no family h/o thyroid cancer, no neck XRT\par FNA nondiagnostic repeatedly of L dominant nodule bc heavily calcified. \par will monitor and repeat US in 6 mos. \par \par vitamin d defic: increase vitamin d to 2000 u qd \par \par overweighT: \par low carb diet \par Encouraged more exercise walking 30 min 3 x week\par target weight loss 6 lbs in the next 6 mos \par \par osteoporosis : she takes budesonide orally daily for autoimmune hepatitis .Untreated \par start calcium 500 mg BID \par increase vitamin d \par check DXA scan now to sangeeta \par

## 2021-02-04 ENCOUNTER — APPOINTMENT (OUTPATIENT)
Dept: HEPATOLOGY | Facility: CLINIC | Age: 46
End: 2021-02-04
Payer: COMMERCIAL

## 2021-02-04 VITALS
WEIGHT: 142 LBS | HEIGHT: 61 IN | OXYGEN SATURATION: 98 % | HEART RATE: 92 BPM | TEMPERATURE: 98 F | BODY MASS INDEX: 26.81 KG/M2 | DIASTOLIC BLOOD PRESSURE: 74 MMHG | SYSTOLIC BLOOD PRESSURE: 109 MMHG

## 2021-02-04 PROCEDURE — 99072 ADDL SUPL MATRL&STAF TM PHE: CPT

## 2021-02-04 PROCEDURE — 99214 OFFICE O/P EST MOD 30 MIN: CPT

## 2021-02-04 NOTE — CONSULT LETTER
[Dear  ___] : Dear  [unfilled], [Consult Letter:] : I had the pleasure of evaluating your patient, [unfilled]. [Please see my note below.] : Please see my note below. [Consult Closing:] : Thank you very much for allowing me to participate in the care of this patient.  If you have any questions, please do not hesitate to contact me. [FreeTextEntry2] : NICKY MALHOTRA (PCP)\par  [FreeTextEntry3] : Waldemar Gaming MD\par , [facility]\par General Hepatology and Gastroenterology\par [facility]\par [facility]\par [address] [place] [address]\[place] [address]\par [phone]\par

## 2021-02-04 NOTE — ASSESSMENT
[FreeTextEntry1] : 45 yoF, autoimmune hepatitis, cystic fibrosis carrier, h/o acute pancreatitides (about 8-10 since age 10), hypothyroidism, osteoporosis, h/o gastritis (hospitalized 2/2020), CCY. AIH diagnosed in 2016 by her gastroenterologist at the time, Dr. Hood, at the time with bilirubin 7.2, AST//763, ALP, normal IgG 759, pos. anti-actin 69 U, MELISSA+,  and typical findings on liver biopsy in 2016 (scanned). \par \par - AIH, MELISSA, ASMA positive. IgG normal throughout. Course:\par - treatment 2016 for 6 months with prednisone (Dr. Hood), then budesonide 3 mg/d for 4 years until 8/2020            (Dr. Andra Morton, [facility])\par - flare 9/2020 three weeks after budesonide stopped with recurrence of significant symptoms, but only mild elevation of AST/ALT  87/78 and no IgG elevation. Nausea, vomiting,  RUQ pain, extreme exhaustion/fatigue same as during initial flare. Also creat 1.33. Referred to our office.\par - budesonide resumed 9/29/20, 9 mg/d, with rapid resolution of symptoms, LFTs nomalized\par  LFTs normalized 11/2020. Budesonide then tapered, on 3 mg/d since late 11/2020.\par - abdominal varices seen on MRI 10/2020; liver non-cirrhotic appearing. \par - 1/19/21 fibroscan: no fibrosis, no steatosis - 3.8 kPa, 217 dB/m (report reviewed, interpretation and chart note corrected).\par - likely intolerant of azathioprine - acute pancreatitis after initiation; less likely recurrent pancreatitis of other cause\par - longstanding h/o being overweight, BMI 26.1 after 10 lbs weight loss in 6 weeks by dieting\par \par - PAZ, creat 1.33 in Sep 2020, improved to 1.11, eGFR 61 - suggestive of CKD2. Mother had one atrophic kidney. No HTN. No h/o significant NSAID intake. UA normal 1/19/21.\par \par - immune to hepatitis B due to vaccination, not immune to hepatitis A\par \par She follows with Dr. Hood's colleague.\par \par I discussed that the intra-abdominal varices seen on MRI may be caused by non-cirrhotic portal hypertension in her case, as the fibroscan showed normal liver stiffness and the MRI showed a non-cirrotic liver morphology. However, I would like to review the MRI images with our radiologists as other signs of portal HTN are missing. Her spleen is not enlarged and her PLT is increased, not decreased. Nonetheless, a form of non-cirrhotic portal hypertension, NRH (nodular regenerative hyperplasia) can be seen in people with autoimmune disease or after treatment with azathioprine.\par \par Plan:\par - continue budesonide 3 mg/d. No azathioprine b/o h/o pancreatitis.\par - abdominal varices: will review MRI images with radiologist. If present, should get EGD.\par - CKD2: refer to nephrology, Dr Mayer\par - return in 6 weeks, labs before that visit.\par - hepatitis A vaccination: will plan at our office as she has had difficulty getting it.

## 2021-02-04 NOTE — HISTORY OF PRESENT ILLNESS
[Autoimmune Disorder] : autoimmune disorder [Needlestick Exposure] : no needlestick exposure [Infected Sexual Partner] : no infected sexual partner [IV Drug Use] : no IV drug use [Tattoo] : no tattoos [Body Piercing] : no body piercing [Hemodialysis] : no hemodialysis [Transfusion before 1992] : no transfusion before 1992 [Transplant before 1992] : no transplant before 1992 [Alcohol Abuse] : no alcohol abuse [Household Contact to HBV] : no household contact to HBV [Travel to Endemic Area] : no travel to an endemic area [Occupational Exposure] : no occupational exposure [Cocaine Use] : no cocaine use [de-identified] : - 2/4/21: returns after bloodwork last visit, UA, and fibroscan. Taking budesonide 3 mg/d, feels normal. EGD not done.\par \par - 1/14/21: returns after 2 months. Recent bloodwork not done. She postponed the EGD close to her home until after her 2nd COVID shot.\par \par - Ms. HORNE is a 45 year old woman with h/o acute pancreatitides (about 8-10 since age 10), hypothyroidism, osteoporosis, h/o gastritis (hospitalized 2/2020), CCY, who  was diagnosed with AIH by her gastroenterologist at the time, Dr. Hood, after she developed dark urine, jaundice, fatigue and some vomiting, and was found to have elevated LFTs with bilirubin 7.2, AST//763, ALP, pos. anti-actin 69 U, MELISSA+,  based on a liver biopsy. She saw Dr. Naranjo at our office for a second opinion after she had developed side-effects to treatment with prednisone, initially 60 mg/d. LFTs had improved. Dr. Naranjo ordered bloodwork, planned to review the liver biopsy, and agreed with tapering prednisone, and recommended the addition of azathioprine. \par The patient did not follow-up at that time. Labs form 9/2016 show AST/ALT mildly elevated, and negative MELISSA, ASMA and normal IgG of 759. She was treated with prednisone for 6 months, then followed with Dr. Andra Morton at [facility] and wishes to transfer care here as it is closer to her home.\par She developed acute pancreatitis to azathioprine and was treated with budesonide 3 mg/d until 8/2020. She developed persistent nausea and vomiting about a week later that lasted for more than three weeks, and RUQ pain, and extreme exhaustion and fatigue. Her gastroenterologist found elevated AST/ALT 87/78, K 4.3, Na 136, Creat 1.33, BUN 20, and resumed budesonide 9 mg/d on 9/29/20. She rapidly improved and has been feeling normally recently. Recent LFTs were normal as below. \par \par Weight hx: 138 lbs, BMI 26.1 after 10 lbs weight loss in 6 weeks by dieting. Max. weight was 148 lbs. Longstanding overweight 130-140 lbs.\par Alcohol hx: does not drink, never did, because of her pancreatitides.\par \par Workup: \par - 1/19/21 fibroscan: 3.8 kPa, 217 dB/m - stage 0 fibrosis, stage 0 steatosis. Interpretation corrected, also values and interpretation in chart note from 1/19/21.\par - 10/12/20 [facility] abdomen wwo ([facility]): normal liver morphology. Vessels patent. Coronary varices, suggestive of portal HTN.\par - 8/4/16 liver biopsy ([facility]; scanned): moderate to marked inflammatory infiltrate involving portal areas with interface activity and extends into lobules. [...] Lymphocytes, plasma cells, eosinophils, some neutrophils., including rare neutrophils in small bile ducts. Lobular cholestasis is also present. No evidence of fibrosis. [...]not entirely characteristic, given pos. MELISSA, AMA, compatible with AIH.

## 2021-02-10 ENCOUNTER — NON-APPOINTMENT (OUTPATIENT)
Age: 46
End: 2021-02-10

## 2021-02-10 NOTE — CONSULT LETTER
[Dear  ___] : Dear  [unfilled], [Consult Letter:] : I had the pleasure of evaluating your patient, [unfilled]. [Please see my note below.] : Please see my note below. [Consult Closing:] : Thank you very much for allowing me to participate in the care of this patient.  If you have any questions, please do not hesitate to contact me. [FreeTextEntry2] : NICKY MALHOTRA (PCP)\par  [FreeTextEntry3] : Waldemar Gaming MD\par , [facility]\par General Hepatology and Gastroenterology\par [facility]\par [facility]\par [address] [place] [address]\[place] [address]\par [phone]\par  room air

## 2021-03-01 ENCOUNTER — APPOINTMENT (OUTPATIENT)
Dept: NEPHROLOGY | Facility: CLINIC | Age: 46
End: 2021-03-01
Payer: COMMERCIAL

## 2021-03-01 PROCEDURE — 99204 OFFICE O/P NEW MOD 45 MIN: CPT | Mod: 95

## 2021-03-01 RX ORDER — OMEPRAZOLE 40 MG/1
40 CAPSULE, DELAYED RELEASE ORAL
Qty: 90 | Refills: 0 | Status: DISCONTINUED | COMMUNITY
Start: 2020-10-03

## 2021-03-01 RX ORDER — IRON/IRON ASP GLY/FA/MV-MIN 38 125-25-1MG
TABLET ORAL
Refills: 0 | Status: DISCONTINUED | COMMUNITY
End: 2021-03-01

## 2021-03-01 NOTE — HISTORY OF PRESENT ILLNESS
[Home] : at home, [unfilled] , at the time of the visit. [Medical Office: ([facility])___] : at the medical office located in  [Verbal consent obtained from patient] : the patient, [unfilled] [FreeTextEntry1] : 45 years old female, born in [place].\par Referred by Dr. Gaming. Was told to have abnormal kidney function about over a year, was told to have stage 3 kidney disease.\par Patient has known CKD (),   on follow up with Dr. Gaming for liver disease.\par Known to have autoimmune hepatitis (), on budesonide currently on 3 mg/day.\par Hypothyroidism (), on Synthroid\par Osteoporosis\par Pancreatitis (from age 11, recurrently) Last episode in 2019.\par Periodic intake of Ibuprofen for cramps or head ache. Once every 2 weeks.\par She has no known DM or HTN. H/o Hyperlipidemia, not on meds; No h/o Gout\par No h/o ankle swelling. No photosensitivity. No joint pains.\par No known h/o kidney stone.\par No hematuria/Transfusions.\par Nocturia: 1-2 times.\par No h/o Sleep apnea. \par Does not take any blood thinners. No known h/o tuberculosis.\par No major weight loss. Had lost weight from keto diet, 25 Lbs weight loss in 2018.\par Has no h/o Pneumonia / UTI.\par No known h/o active CAD/CVA/PVD/DVT/neoplasia/active infections/bleeding/open wounds/falls.\par Reports no major allergies. Seasonal allergies.\par Most recent hospitalization/for: 2020 for gastritis. At [facility].\par Past surgeries:\par Cholecystectomy (), hernia repair , has a mesh.\par C sections, last one \par No history of kidney/ bladder surgery.\par Non smoker. Quit .\par Family: \par Lives with: 2 children, , father lives downstairs\par Parents are . Father - has DM Mother- Had one kidney not working. Heart disease,  in . Siblings- Only child..\par Children: One son 1 years has cystic fibrosis. Has DM. Doing well. The other son is Healthy. \par No family history of kidney disease except for mother having one non working kidney.\par Independent for ADL\par Able to walk two miles, can climb stairs with difficulty.\par Driving: Drives\par ROS: Has h/o shortness of breath on exertion. \par Functional/employment status: School aid, part time.\par \par Kidney Biopsy:None\par \par \par Prior Studies:\par Cardiology: None recent\par Cancer Screen:PAP, Mammo- ok\par Imaging:\par Consultants: Dr. Choudhary for hypothyroidism\par Primary MD:Nagi Benitez\par Hepatologist: Dr. Gaming\par Hematlogist: Erika- was told low iron, got iron infusion once. \par \par \par \par \par \par Currently:\par \par Patient feels well\par Reviewed for acute symptoms-currently has none.\par \par On Telehealth visit:\par Patient appears comfortable\par No distress, not dyspneic\par Conscious, alert, oriented X 3\par Speech: Normal\par Other observations No ankle edema\par Reviewed last lab data \par Reviewed available creatinine trend , urinalysis (trace protein, most recent), imaging (MRI)

## 2021-03-01 NOTE — ASSESSMENT
[FreeTextEntry1] : Elevated creatinine: Will repeat blood chemistry including phosphorus, uric acid, A1C, immunofixation .\par Will get repeat urinalysis and urine protein/creatinine.\par Will get renal and post void bladder sonogram\par Will check [facility]as[facility] panel.\par CKD risk factors- NSAID use,  Hyperlipidemia.\par Discussed current level of renal function/proteinuria and its implications.\par Discussed renal function preservation strategies including: Sleep hygiene, avoiding NSAIDs and herbal medications, avoiding excessive animal protein and sodium in diet, maintaining optimized weight, blood pressure, glucose and lipids.\par Hyperlipidemia: Will check lipid panel\par Hypothyroidism: On f/u with endocrinologist\par Auto immune Hepatitis: on f/u wt Hepatologist\par \par F/u in 6 weeks to review labs and imaging.\par

## 2021-03-02 ENCOUNTER — LABORATORY RESULT (OUTPATIENT)
Age: 46
End: 2021-03-02

## 2021-03-02 ENCOUNTER — APPOINTMENT (OUTPATIENT)
Dept: HEPATOLOGY | Facility: CLINIC | Age: 46
End: 2021-03-02
Payer: COMMERCIAL

## 2021-03-02 PROCEDURE — 90471 IMMUNIZATION ADMIN: CPT

## 2021-03-02 PROCEDURE — 99072 ADDL SUPL MATRL&STAF TM PHE: CPT

## 2021-03-02 PROCEDURE — 90632 HEPA VACCINE ADULT IM: CPT

## 2021-03-08 LAB
25(OH)D3 SERPL-MCNC: 20.6 NG/ML
ALBUMIN SERPL ELPH-MCNC: 4.7 G/DL
ALP BLD-CCNC: 78 U/L
ALT SERPL-CCNC: 20 U/L
ANION GAP SERPL CALC-SCNC: 10 MMOL/L
APPEARANCE: CLEAR
AST SERPL-CCNC: 20 U/L
BACTERIA: NEGATIVE
BASOPHILS # BLD AUTO: 0 K/UL
BASOPHILS NFR BLD AUTO: 0 %
BILIRUB SERPL-MCNC: 1 MG/DL
BILIRUBIN URINE: NEGATIVE
BLOOD URINE: NORMAL
BUN SERPL-MCNC: 17 MG/DL
C3 SERPL-MCNC: 142 MG/DL
C4 SERPL-MCNC: 32 MG/DL
CALCIUM SERPL-MCNC: 9.7 MG/DL
CALCIUM SERPL-MCNC: 9.7 MG/DL
CHLORIDE SERPL-SCNC: 99 MMOL/L
CHOLEST SERPL-MCNC: 240 MG/DL
CO2 SERPL-SCNC: 28 MMOL/L
COLOR: YELLOW
CREAT SERPL-MCNC: 1.2 MG/DL
CREAT SPEC-SCNC: 175 MG/DL
CREAT/PROT UR: 0.1 RATIO
EOSINOPHIL # BLD AUTO: 0 K/UL
EOSINOPHIL NFR BLD AUTO: 0 %
ESTIMATED AVERAGE GLUCOSE: 100 MG/DL
GLUCOSE QUALITATIVE U: NEGATIVE
GLUCOSE SERPL-MCNC: 81 MG/DL
HBA1C MFR BLD HPLC: 5.1 %
HCT VFR BLD CALC: 44.3 %
HDLC SERPL-MCNC: 66 MG/DL
HGB BLD-MCNC: 14.5 G/DL
HYALINE CASTS: 0 /LPF
KETONES URINE: NEGATIVE
LDLC SERPL CALC-MCNC: 138 MG/DL
LEUKOCYTE ESTERASE URINE: ABNORMAL
LYMPHOCYTES # BLD AUTO: 6.87 K/UL
LYMPHOCYTES NFR BLD AUTO: 51.3 %
M PROTEIN SPEC IFE-MCNC: NORMAL
MAGNESIUM SERPL-MCNC: 1.9 MG/DL
MAN DIFF?: NORMAL
MCHC RBC-ENTMCNC: 31 PG
MCHC RBC-ENTMCNC: 32.7 GM/DL
MCV RBC AUTO: 94.7 FL
MICROSCOPIC-UA: NORMAL
MONOCYTES # BLD AUTO: 0.58 K/UL
MONOCYTES NFR BLD AUTO: 4.3 %
NEUTROPHILS # BLD AUTO: 5.01 K/UL
NEUTROPHILS NFR BLD AUTO: 37.4 %
NITRITE URINE: NEGATIVE
NONHDLC SERPL-MCNC: 174 MG/DL
PARATHYROID HORMONE INTACT: 25 PG/ML
PH URINE: 6.5
PHOSPHATE SERPL-MCNC: 4 MG/DL
PHOSPHOLIPASE A2 RECEPTOR ELISA: <1.8 RU/ML
PLATELET # BLD AUTO: 607 K/UL
POTASSIUM SERPL-SCNC: 4.4 MMOL/L
PROT SERPL-MCNC: 7.6 G/DL
PROT UR-MCNC: 8 MG/DL
PROTEIN URINE: NORMAL
RBC # BLD: 4.68 M/UL
RBC # FLD: 13.6 %
RED BLOOD CELLS URINE: 3 /HPF
SODIUM SERPL-SCNC: 137 MMOL/L
SPECIFIC GRAVITY URINE: 1.02
SQUAMOUS EPITHELIAL CELLS: 7 /HPF
STREP DNASE B TITR SER: <78 U/ML
TRIGL SERPL-MCNC: 178 MG/DL
URATE SERPL-MCNC: 5.9 MG/DL
UROBILINOGEN URINE: NORMAL
WBC # FLD AUTO: 13.4 K/UL
WHITE BLOOD CELLS URINE: 9 /HPF

## 2021-03-18 ENCOUNTER — APPOINTMENT (OUTPATIENT)
Dept: HEPATOLOGY | Facility: CLINIC | Age: 46
End: 2021-03-18
Payer: COMMERCIAL

## 2021-03-18 VITALS
BODY MASS INDEX: 26.62 KG/M2 | HEART RATE: 67 BPM | TEMPERATURE: 98 F | HEIGHT: 61 IN | SYSTOLIC BLOOD PRESSURE: 110 MMHG | OXYGEN SATURATION: 99 % | WEIGHT: 141 LBS | DIASTOLIC BLOOD PRESSURE: 72 MMHG

## 2021-03-18 DIAGNOSIS — Z87.19 PERSONAL HISTORY OF OTHER DISEASES OF THE DIGESTIVE SYSTEM: ICD-10-CM

## 2021-03-18 DIAGNOSIS — K83.8 OTHER SPECIFIED DISEASES OF BILIARY TRACT: ICD-10-CM

## 2021-03-18 DIAGNOSIS — R53.82 CHRONIC FATIGUE, UNSPECIFIED: ICD-10-CM

## 2021-03-18 PROCEDURE — 99215 OFFICE O/P EST HI 40 MIN: CPT

## 2021-03-18 PROCEDURE — 99072 ADDL SUPL MATRL&STAF TM PHE: CPT

## 2021-03-18 RX ORDER — BUDESONIDE 3 MG/1
3 CAPSULE ORAL
Refills: 0 | Status: DISCONTINUED | COMMUNITY
End: 2021-03-18

## 2021-03-18 NOTE — ASSESSMENT
[FreeTextEntry1] : 45 yoF, autoimmune hepatitis, cystic fibrosis carrier, h/o acute pancreatitides (about 8-10 since age 10), hypothyroidism, osteoporosis, h/o gastritis (hospitalized 2/2020), CCY. AIH diagnosed in 2016 by her gastroenterologist at the time, Dr. Hood, at the time with bilirubin 7.2, AST//763, ALP, normal IgG 759, pos. anti-actin 69 U, MELISSA+,  and typical findings on liver biopsy in 2016 (scanned). \par \par - AIH, MELISSA, ASMA positive. IgG normal throughout. Course:\par - treatment 2016 for 6 months with prednisone (Dr. Hood), then budesonide 3 mg/d for 4 years until 8/2020            (Dr. Andra Morton, [facility])\par - nausea, vomiting,  RUQ pain, extreme exhaustion/fatigue same as during initial flare in 9/2020, three weeks after budesonide stopped, but only mild elevation of AST/ALT  87/78 and no IgG elevation.  Also creat 1.33. Referred to our office.\par - budesonide resumed 9/29/20, 9 mg/d, with rapid resolution of symptoms, LFTs normalized by 11/2020. Budesonide then tapered, on 3 mg/d since late 11/2020.\par \par \par Her symptoms were possibly related to hypoaldosteronism rather than flare of autoimmune hepatitis as they seem too significant for the degree of LFT elevation. More fatigued again recently, in March 2021, requiring more sleep than normal for her.\par Elevated creatinine 1.6 in Jan 2021 may have been due to related dehydration. K and bicarb normal.\par \par - abdominal varices seen on MRI 10/2020; liver non-cirrhotic appearing, CD scanned and images reviewed with Dr. Rossi, who confirmed and also described mild intrahepatic biliary ductal dilatation.\par - 1/19/21 fibroscan: no fibrosis, no steatosis - 3.8 kPa, 217 dB/m (report reviewed, interpretation and chart note corrected).\par - likely intolerant of azathioprine - acute pancreatitis after initiation; less likely recurrent pancreatitis of other cause\par - longstanding h/o being overweight, BMI 26.1 after 10 lbs weight loss in 6 weeks by dieting\par \par - PAZ, creat 1.33 in Sep 2020, improved to 1.11, eGFR 61 - suggestive of CKD2. Mother had one atrophic kidney. No HTN. No h/o significant NSAID intake. UA normal 1/19/21.\par \par - immune to hepatitis B due to vaccination, not immune to hepatitis A\par \par She follows with Dr. Hood's colleague.\par \par Plan:\par - hypoaldosteronism: stop budesonide, start prednisone with weekly taper 15 mg/d, 12.5, 10, 7.5, then 5 mg/d thereafter. No azathioprine b/o h/o pancreatitis. Repeat LFTs before next visit in 6 weeks and today.\par - abdominal varices: will review MRI images with radiologist. If present, should get EGD.\par - CKD2: f/u with nephrology\par - biliary ductal dilatation: US abdomen\par - hepatitis A vaccination: will plan at our office as she has had difficulty getting it.

## 2021-03-18 NOTE — CONSULT LETTER
[Dear  ___] : Dear  [unfilled], [Consult Letter:] : I had the pleasure of evaluating your patient, [unfilled]. [Please see my note below.] : Please see my note below. [Consult Closing:] : Thank you very much for allowing me to participate in the care of this patient.  If you have any questions, please do not hesitate to contact me. [FreeTextEntry2] : NICKY MALHOTRA (PCP)\par  [FreeTextEntry3] : Waldemar Gaming MD\par , [facility]\par General Hepatology and Gastroenterology\par [facility]\par [facility]\par [address] [place] [address]\[place] [address]\par [phone]\par

## 2021-03-18 NOTE — HISTORY OF PRESENT ILLNESS
[Autoimmune Disorder] : autoimmune disorder [Needlestick Exposure] : no needlestick exposure [Infected Sexual Partner] : no infected sexual partner [IV Drug Use] : no IV drug use [Tattoo] : no tattoos [Body Piercing] : no body piercing [Hemodialysis] : no hemodialysis [Transfusion before 1992] : no transfusion before 1992 [Transplant before 1992] : no transplant before 1992 [Alcohol Abuse] : no alcohol abuse [Household Contact to HBV] : no household contact to HBV [Travel to Endemic Area] : no travel to an endemic area [Occupational Exposure] : no occupational exposure [Cocaine Use] : no cocaine use [de-identified] : - 3/18/21: returns after repeat labs, done on 3/3, and seeing Dr. Mayer. Taking budesonide. Feels more fatigue than when steroid started at higher dose, goes to bed at 8pm, sleeps until she gets her kids out of the house, then sleeps another hour or so. \par \par - 2/4/21: returns after bloodwork last visit, UA, and fibroscan. Taking budesonide 3 mg/d, feels normal. EGD not done.\par \par - 1/14/21: returns after 2 months. Recent bloodwork not done. She postponed the EGD close to her home until after her 2nd COVID shot.\par \par - Ms. HORNE is a 45 year old woman with h/o acute pancreatitides (about 8-10 since age 10), hypothyroidism, osteoporosis, h/o gastritis (hospitalized 2/2020), CCY, who  was diagnosed with AIH by her gastroenterologist at the time, Dr. Hood, after she developed dark urine, jaundice, fatigue and some vomiting, and was found to have elevated LFTs with bilirubin 7.2, AST//763, ALP, pos. anti-actin 69 U, MELISSA+,  based on a liver biopsy. She saw Dr. Naranjo at our office for a second opinion after she had developed side-effects to treatment with prednisone, initially 60 mg/d. LFTs had improved. Dr. Naranjo ordered bloodwork, planned to review the liver biopsy, and agreed with tapering prednisone, and recommended the addition of azathioprine. \par The patient did not follow-up at that time. Labs form 9/2016 show AST/ALT mildly elevated, and negative MELISSA, ASMA and normal IgG of 759. She was treated with prednisone for 6 months, then followed with Dr. Andra Morton at [facility] and wishes to transfer care here as it is closer to her home.\par She developed acute pancreatitis to azathioprine and was treated with budesonide 3 mg/d until 8/2020. She developed persistent nausea and vomiting about a week later that lasted for more than three weeks, and RUQ pain, and extreme exhaustion and fatigue. Her gastroenterologist found elevated AST/ALT 87/78, K 4.3, Na 136, Creat 1.33, BUN 20, and resumed budesonide 9 mg/d on 9/29/20. She rapidly improved and has been feeling normally recently. Recent LFTs were normal as below. \par \par Weight hx: 138 lbs, BMI 26.1 after 10 lbs weight loss in 6 weeks by dieting. Max. weight was 148 lbs. Longstanding overweight 130-140 lbs.\par Alcohol hx: does not drink, never did, because of her pancreatitides.\par \par Workup: \par - 1/19/21 fibroscan: 3.8 kPa, 217 dB/m - stage 0 fibrosis, stage 0 steatosis. Interpretation corrected, also values and interpretation in chart note from 1/19/21.\par - 10/12/20 MRI abdomen wwo ([facility]): normal liver morphology. Vessels patent. Coronary varices, suggestive of portal HTN.\par - 8/4/16 liver biopsy ([facility]; scanned): moderate to marked inflammatory infiltrate involving portal areas with interface activity and extends into lobules. [...] Lymphocytes, plasma cells, eosinophils, some neutrophils., including rare neutrophils in small bile ducts. Lobular cholestasis is also present. No evidence of fibrosis. [...]not entirely characteristic, given pos. MELISSA, AMA, compatible with AIH.

## 2021-03-19 ENCOUNTER — NON-APPOINTMENT (OUTPATIENT)
Age: 46
End: 2021-03-19

## 2021-03-19 LAB
ALBUMIN SERPL ELPH-MCNC: 4.4 G/DL
ALP BLD-CCNC: 71 U/L
ALT SERPL-CCNC: 18 U/L
ANION GAP SERPL CALC-SCNC: 11 MMOL/L
AST SERPL-CCNC: 19 U/L
BILIRUB SERPL-MCNC: 0.6 MG/DL
BUN SERPL-MCNC: 11 MG/DL
CALCIUM SERPL-MCNC: 9.6 MG/DL
CHLORIDE SERPL-SCNC: 101 MMOL/L
CO2 SERPL-SCNC: 24 MMOL/L
CREAT SERPL-MCNC: 0.96 MG/DL
GLUCOSE SERPL-MCNC: 106 MG/DL
IGG SER QL IEP: 1061 MG/DL
POTASSIUM SERPL-SCNC: 5.2 MMOL/L
PROT SERPL-MCNC: 7.2 G/DL
SODIUM SERPL-SCNC: 136 MMOL/L

## 2021-03-22 ENCOUNTER — OUTPATIENT (OUTPATIENT)
Dept: OUTPATIENT SERVICES | Facility: HOSPITAL | Age: 46
LOS: 1 days | End: 2021-03-22

## 2021-03-22 ENCOUNTER — APPOINTMENT (OUTPATIENT)
Dept: ULTRASOUND IMAGING | Facility: CLINIC | Age: 46
End: 2021-03-22
Payer: COMMERCIAL

## 2021-03-22 DIAGNOSIS — K83.8 OTHER SPECIFIED DISEASES OF BILIARY TRACT: ICD-10-CM

## 2021-03-22 PROCEDURE — 76700 US EXAM ABDOM COMPLETE: CPT | Mod: 26

## 2021-03-25 ENCOUNTER — NON-APPOINTMENT (OUTPATIENT)
Age: 46
End: 2021-03-25

## 2021-04-12 ENCOUNTER — APPOINTMENT (OUTPATIENT)
Dept: NEPHROLOGY | Facility: CLINIC | Age: 46
End: 2021-04-12
Payer: COMMERCIAL

## 2021-04-12 VITALS
WEIGHT: 141.09 LBS | OXYGEN SATURATION: 98 % | TEMPERATURE: 97.2 F | BODY MASS INDEX: 26.66 KG/M2 | HEART RATE: 71 BPM | DIASTOLIC BLOOD PRESSURE: 88 MMHG | SYSTOLIC BLOOD PRESSURE: 146 MMHG

## 2021-04-12 VITALS — DIASTOLIC BLOOD PRESSURE: 80 MMHG | SYSTOLIC BLOOD PRESSURE: 134 MMHG

## 2021-04-12 PROCEDURE — 99214 OFFICE O/P EST MOD 30 MIN: CPT

## 2021-04-12 PROCEDURE — 99072 ADDL SUPL MATRL&STAF TM PHE: CPT

## 2021-04-12 NOTE — ASSESSMENT
[FreeTextEntry1] : Elevated creatinine: Resolved. Work up including Will repeat blood chemistry, phosphorus, uric acid, A1C, immunofixation reviewed. .\par repeat urinalysis and urine protein/creatinine reviewed. Also reviewed renal  sonogram\par CKD risk factors- NSAID use,  Hyperlipidemia.\par Hyperlipidemia: Reviewed and target\par Hypothyroidism: On f/u with endocrinologist\par Auto immune Hepatitis: on f/u wt Hepatologist\par Discussed current level of renal function and implications of prior elevated creatinine.\par Discussed renal function preservation strategies including: Sleep hygiene, avoiding NSAIDs and herbal medications, avoiding excessive animal protein and sodium in diet, maintaining optimized weight, blood pressure, glucose and lipids.\par F/u in 9 months for follow up.\par

## 2021-04-12 NOTE — HISTORY OF PRESENT ILLNESS
[FreeTextEntry1] : 45 years old female, born in [place].\par Referred by Dr. Gaming. Was told to have abnormal kidney function about over a year, was told to have stage 3 kidney disease.\par Patient has known CKD (),   on follow up with Dr. Gaming for liver disease.\par Known to have autoimmune hepatitis (), on budesonide currently on 3 mg/day.\par Hypothyroidism (), on Synthroid\par Osteoporosis\par Pancreatitis (from age 11, recurrently) Last episode in 2019.\par Periodic intake of Ibuprofen for cramps or head ache. Once every 2 weeks.\par She has no known DM or HTN. H/o Hyperlipidemia, not on meds; No h/o Gout\par No h/o ankle swelling. No photosensitivity. No joint pains.\par No known h/o kidney stone.\par No hematuria/Transfusions.\par Nocturia: 1-2 times.\par No h/o Sleep apnea. \par Does not take any blood thinners. No known h/o tuberculosis.\par No major weight loss. Had lost weight from keto diet, 25 Lbs weight loss in 2018.\par Has no h/o Pneumonia / UTI.\par No known h/o active CAD/CVA/PVD/DVT/neoplasia/active infections/bleeding/open wounds/falls.\par Reports no major allergies. Seasonal allergies.\par Most recent hospitalization/for: 2020 for gastritis. At [facility].\par Past surgeries:\par Cholecystectomy (), hernia repair , has a mesh.\par C sections, last one \par No history of kidney/ bladder surgery.\par Non smoker. Quit .\par Family: \par Lives with: 2 children, , father lives downstairs\par Parents are . Father - has DM Mother- Had one kidney not working. Heart disease,  in . Siblings- Only child..\par Children: One son 1 years has cystic fibrosis. Has DM. Doing well. The other son is Healthy. \par No family history of kidney disease except for mother having one non working kidney.\par Independent for ADL\par Able to walk two miles, can climb stairs with difficulty.\par Driving: Drives\par ROS: Has h/o shortness of breath on exertion. \par Functional/employment status: School aid, part time.\par \par Kidney Biopsy:None\par \par \par Prior Studies:\par Cardiology: None recent\par Cancer Screen:PAP, Mammo- ok\par Imaging:\par Consultants: Dr. Choudhary for hypothyroidism\par Primary MD:Nagi Benitez\par Hepatologist: Dr. Gaming\par Hematlogist: Erika- was told low iron, got iron infusion once. \par \par \par \par \par \par Currently:\par \par Patient feels well\par Reviewed for acute symptoms-currently has none.\par \par On Telehealth visit:\par Patient appears comfortable\par No distress, not dyspneic\par Conscious, alert, oriented X 3\par Speech: Normal\par Other observations No ankle edema\par Reviewed last lab data \par Reviewed available creatinine trend , urinalysis (trace protein, most recent), imaging (MRI)

## 2021-04-12 NOTE — PHYSICAL EXAM
[General Appearance - Alert] : alert [General Appearance - In No Acute Distress] : in no acute distress [Sclera] : the sclera and conjunctiva were normal [PERRL With Normal Accommodation] : pupils were equal in size, round, and reactive to light [Extraocular Movements] : extraocular movements were intact [Oropharynx] : the oropharynx was normal [Outer Ear] : the ears and nose were normal in appearance [Jugular Venous Distention Increased] : there was no jugular-venous distention [Auscultation Breath Sounds / Voice Sounds] : lungs were clear to auscultation bilaterally [Heart Sounds Gallop] : no gallops [Heart Sounds Pericardial Friction Rub] : no pericardial rub [Edema] : there was no peripheral edema [Bowel Sounds] : normal bowel sounds [Abdomen Soft] : soft [Abdomen Tenderness] : non-tender [Abdomen Mass (___ Cm)] : no abdominal mass palpated [Cervical Lymph Nodes Enlarged Posterior Bilaterally] : posterior cervical [Cervical Lymph Nodes Enlarged Anterior Bilaterally] : anterior cervical [Supraclavicular Lymph Nodes Enlarged Bilaterally] : supraclavicular [Axillary Lymph Nodes Enlarged Bilaterally] : axillary [] : no rash [Involuntary Movements] : no involuntary movements were seen [No Focal Deficits] : no focal deficits [Oriented To Time, Place, And Person] : oriented to person, place, and time [Impaired Insight] : insight and judgment were intact [Affect] : the affect was normal

## 2021-04-29 ENCOUNTER — LABORATORY RESULT (OUTPATIENT)
Age: 46
End: 2021-04-29

## 2021-04-29 ENCOUNTER — APPOINTMENT (OUTPATIENT)
Dept: OBGYN | Facility: CLINIC | Age: 46
End: 2021-04-29
Payer: COMMERCIAL

## 2021-04-29 VITALS
TEMPERATURE: 97 F | DIASTOLIC BLOOD PRESSURE: 70 MMHG | WEIGHT: 140 LBS | HEIGHT: 64 IN | BODY MASS INDEX: 23.9 KG/M2 | SYSTOLIC BLOOD PRESSURE: 120 MMHG

## 2021-04-29 DIAGNOSIS — N92.6 IRREGULAR MENSTRUATION, UNSPECIFIED: ICD-10-CM

## 2021-04-29 PROCEDURE — 99072 ADDL SUPL MATRL&STAF TM PHE: CPT

## 2021-04-29 PROCEDURE — 99213 OFFICE O/P EST LOW 20 MIN: CPT

## 2021-04-29 PROCEDURE — 81025 URINE PREGNANCY TEST: CPT

## 2021-04-29 PROCEDURE — 81003 URINALYSIS AUTO W/O SCOPE: CPT | Mod: NC,QW

## 2021-04-29 NOTE — PLAN
[FreeTextEntry1] : - differentials for irregular bleeding discussed, including structural, hormonal or potentially malignant causes\par - she is recommended to return for a pelvic us and review of results\par - labs obtained today\par - annual due- will schedule\par - discussed the need for an endometrial evaluation prior to any surgical intervention/hysterectomy.

## 2021-04-29 NOTE — HISTORY OF PRESENT ILLNESS
[Patient reported PAP Smear was normal] : Patient reported PAP Smear was normal [N] : Patient reports normal menses [Vasectomy (partner)] : has a partner with a vasectomy [Monogamous (Male Partner)] : is monogamous with a male partner [Y] : Positive pregnancy history [Menarche Age: ____] : age at menarche was [unfilled] [Currently Active] : currently active [Men] : men [Vaginal] : vaginal [No] : No [Patient refuses STI testing] : Patient refuses STI testing [TextBox_4] : Caron is here c/o irregular menses over the last year.  She reports bleeding mid month each month- each bleed feeling like a regular 6 day period.  She is having occasional lower pelvic cramping as well x 2 mos.\par \par she was seen in 2019 for irregular bleeding as well- she was noted to have an ovarian cyst and was recommended to return for a repeat sonogram for cyst surveillance and consult for endometrial evaluation- she did not complete this recommendation.\par \par Pt states she 'just wants it out and be done with it' [Mammogramdate] : 04/17/19 [TextBox_19] : BR1 [PapSmeardate] : 04/15/19 [BoneDensityDate] : 01/29/21 [TextBox_37] : NORMAL [LMPDate] : 04/21/21 [PGHxTotal] : 2 [[facility] : 2 [facility] [FreeTextEntry1] : 04/21/21

## 2021-04-30 LAB
HCG UR QL: NEGATIVE
QUALITY CONTROL: YES

## 2021-05-07 LAB
BASOPHILS # BLD AUTO: 0.07 K/UL
BASOPHILS NFR BLD AUTO: 0.4 %
BILIRUB UR QL STRIP: NORMAL
EOSINOPHIL # BLD AUTO: 0.05 K/UL
EOSINOPHIL NFR BLD AUTO: 0.3 %
ESTRADIOL SERPL-MCNC: 264 PG/ML
FSH SERPL-MCNC: 5.9 IU/L
GLUCOSE UR-MCNC: NORMAL
HCG SERPL-MCNC: <1 MIU/ML
HCG UR QL: 1 EU/DL
HCT VFR BLD CALC: 44.8 %
HGB BLD-MCNC: 14.3 G/DL
HGB UR QL STRIP.AUTO: NORMAL
IMM GRANULOCYTES NFR BLD AUTO: 0.5 %
KETONES UR-MCNC: NORMAL
LEUKOCYTE ESTERASE UR QL STRIP: NORMAL
LH SERPL-ACNC: 6 IU/L
LYMPHOCYTES # BLD AUTO: 6.51 K/UL
LYMPHOCYTES NFR BLD AUTO: 35.6 %
MAN DIFF?: NORMAL
MCHC RBC-ENTMCNC: 30.4 PG
MCHC RBC-ENTMCNC: 31.9 GM/DL
MCV RBC AUTO: 95.1 FL
MONOCYTES # BLD AUTO: 0.9 K/UL
MONOCYTES NFR BLD AUTO: 4.9 %
NEUTROPHILS # BLD AUTO: 10.65 K/UL
NEUTROPHILS NFR BLD AUTO: 58.3 %
NITRITE UR QL STRIP: NORMAL
PH UR STRIP: 6.5
PLATELET # BLD AUTO: 545 K/UL
PROLACTIN SERPL-MCNC: 6 NG/ML
PROT UR STRIP-MCNC: NORMAL
RBC # BLD: 4.71 M/UL
RBC # FLD: 13.6 %
SP GR UR STRIP: 1.02
TSH SERPL-ACNC: 0.49 UIU/ML
WBC # FLD AUTO: 18.28 K/UL

## 2021-05-19 ENCOUNTER — APPOINTMENT (OUTPATIENT)
Dept: OBGYN | Facility: CLINIC | Age: 46
End: 2021-05-19

## 2021-05-20 ENCOUNTER — NON-APPOINTMENT (OUTPATIENT)
Age: 46
End: 2021-05-20

## 2021-05-21 ENCOUNTER — NON-APPOINTMENT (OUTPATIENT)
Age: 46
End: 2021-05-21

## 2021-05-23 ENCOUNTER — APPOINTMENT (OUTPATIENT)
Dept: DISASTER EMERGENCY | Facility: CLINIC | Age: 46
End: 2021-05-23

## 2021-05-23 LAB — SARS-COV-2 N GENE NPH QL NAA+PROBE: NOT DETECTED

## 2021-05-26 ENCOUNTER — OUTPATIENT (OUTPATIENT)
Dept: OUTPATIENT SERVICES | Facility: HOSPITAL | Age: 46
LOS: 1 days | Discharge: HOME | End: 2021-05-26
Payer: COMMERCIAL

## 2021-05-26 ENCOUNTER — RESULT REVIEW (OUTPATIENT)
Age: 46
End: 2021-05-26

## 2021-05-26 ENCOUNTER — APPOINTMENT (OUTPATIENT)
Dept: HEPATOLOGY | Facility: HOSPITAL | Age: 46
End: 2021-05-26

## 2021-05-26 VITALS
WEIGHT: 138.89 LBS | HEIGHT: 60 IN | OXYGEN SATURATION: 99 % | TEMPERATURE: 97 F | RESPIRATION RATE: 16 BRPM | SYSTOLIC BLOOD PRESSURE: 118 MMHG | HEART RATE: 75 BPM | DIASTOLIC BLOOD PRESSURE: 71 MMHG

## 2021-05-26 VITALS
DIASTOLIC BLOOD PRESSURE: 60 MMHG | RESPIRATION RATE: 18 BRPM | HEART RATE: 76 BPM | SYSTOLIC BLOOD PRESSURE: 96 MMHG | OXYGEN SATURATION: 100 %

## 2021-05-26 DIAGNOSIS — K75.4 AUTOIMMUNE HEPATITIS: ICD-10-CM

## 2021-05-26 DIAGNOSIS — Z98.890 OTHER SPECIFIED POSTPROCEDURAL STATES: Chronic | ICD-10-CM

## 2021-05-26 LAB — HCG UR QL: NEGATIVE — SIGNIFICANT CHANGE UP

## 2021-05-26 PROCEDURE — 88342 IMHCHEM/IMCYTCHM 1ST ANTB: CPT | Mod: 26

## 2021-05-26 PROCEDURE — 43239 EGD BIOPSY SINGLE/MULTIPLE: CPT

## 2021-05-26 PROCEDURE — 88305 TISSUE EXAM BY PATHOLOGIST: CPT | Mod: 26

## 2021-05-26 NOTE — PROCEDURE
[FreeTextEntry1] : - 54/26/21 EGD: no varices; moderate erythema stomach, biopsied. Single raised erosion antrum. Dilated lacteals in duodenum, biopsied. PPI prescribed.

## 2021-05-26 NOTE — ASU PATIENT PROFILE, ADULT - NS TRANSFER PROSTHESIS:
[FreeTextEntry1] : Venous Stasis Wound, Right Ankle\par \par Patient seen and evaluated \par All findings discussed with patient \par Wound debrided with curette after topical lidocaine application \par Dressed with medihoneyl/dsd/abd/kerlix/coban\par rx ankle xrays r/o fx  none

## 2021-05-28 ENCOUNTER — APPOINTMENT (OUTPATIENT)
Dept: OBGYN | Facility: CLINIC | Age: 46
End: 2021-05-28
Payer: COMMERCIAL

## 2021-05-28 ENCOUNTER — APPOINTMENT (OUTPATIENT)
Dept: HEPATOLOGY | Facility: CLINIC | Age: 46
End: 2021-05-28

## 2021-05-28 VITALS
SYSTOLIC BLOOD PRESSURE: 116 MMHG | DIASTOLIC BLOOD PRESSURE: 70 MMHG | HEIGHT: 64 IN | WEIGHT: 137 LBS | BODY MASS INDEX: 23.39 KG/M2 | TEMPERATURE: 97.5 F

## 2021-05-28 DIAGNOSIS — R10.2 PELVIC AND PERINEAL PAIN: ICD-10-CM

## 2021-05-28 LAB — SURGICAL PATHOLOGY STUDY: SIGNIFICANT CHANGE UP

## 2021-05-28 PROCEDURE — 99072 ADDL SUPL MATRL&STAF TM PHE: CPT

## 2021-05-28 PROCEDURE — 99212 OFFICE O/P EST SF 10 MIN: CPT

## 2021-05-28 RX ORDER — PREDNISONE 5 MG/1
5 TABLET ORAL DAILY
Qty: 45 | Refills: 2 | Status: DISCONTINUED | COMMUNITY
Start: 2021-03-18 | End: 2021-05-28

## 2021-05-28 NOTE — DISCUSSION/SUMMARY
[FreeTextEntry1] : \par - she is advised to return for a pelvic US and review of results after ER visit and CT scan results.\par - discussed on her previous visit, she had irregular bleeding and had been advised for a pelvic US as well and the need for an endometrial evaluation prior to any surgical intervention/hysterectomy that she is desiring. \par -patient verbalized need for pelvic US for desired surgical intervention.

## 2021-05-28 NOTE — HISTORY OF PRESENT ILLNESS
[Vasectomy (partner)] : has a partner with a vasectomy [Y] : Patient is sexually active [Monogamous (Male Partner)] : is monogamous with a male partner [Menarche Age: ____] : age at menarche was [unfilled] [No] : Patient does not have concerns regarding sex [Currently Active] : currently active [Mammogramdate] : 04/17/19 [TextBox_19] : BR 1 [PapSmeardate] : 04/15/19 [TextBox_31] : ASCUS [BoneDensityDate] : 01/29/21 [TextBox_37] : NORMAL [LMPDate] : 05/19/21 [PGHxTotal] : 2 [[facility] : 2 [[facility] : 2 [FreeTextEntry1] : 05/19/21

## 2021-06-02 DIAGNOSIS — K25.9 GASTRIC ULCER, UNSPECIFIED AS ACUTE OR CHRONIC, W/OUT HEMORRHAGE OR PERFORATION: ICD-10-CM

## 2021-06-02 RX ORDER — ESOMEPRAZOLE MAGNESIUM 20 MG/1
20 CAPSULE, DELAYED RELEASE ORAL
Qty: 30 | Refills: 0 | Status: DISCONTINUED | COMMUNITY
Start: 2021-05-26 | End: 2021-06-02

## 2021-06-03 ENCOUNTER — NON-APPOINTMENT (OUTPATIENT)
Age: 46
End: 2021-06-03

## 2021-06-04 ENCOUNTER — NON-APPOINTMENT (OUTPATIENT)
Age: 46
End: 2021-06-04

## 2021-06-24 ENCOUNTER — ASOB RESULT (OUTPATIENT)
Age: 46
End: 2021-06-24

## 2021-06-24 ENCOUNTER — APPOINTMENT (OUTPATIENT)
Dept: OBGYN | Facility: CLINIC | Age: 46
End: 2021-06-24
Payer: COMMERCIAL

## 2021-06-24 ENCOUNTER — APPOINTMENT (OUTPATIENT)
Dept: HEPATOLOGY | Facility: CLINIC | Age: 46
End: 2021-06-24
Payer: COMMERCIAL

## 2021-06-24 VITALS
BODY MASS INDEX: 27.09 KG/M2 | TEMPERATURE: 97.2 F | DIASTOLIC BLOOD PRESSURE: 66 MMHG | SYSTOLIC BLOOD PRESSURE: 120 MMHG | WEIGHT: 138 LBS | HEIGHT: 60 IN

## 2021-06-24 VITALS
TEMPERATURE: 98 F | BODY MASS INDEX: 26.95 KG/M2 | SYSTOLIC BLOOD PRESSURE: 107 MMHG | OXYGEN SATURATION: 97 % | WEIGHT: 138 LBS | DIASTOLIC BLOOD PRESSURE: 74 MMHG | HEART RATE: 74 BPM

## 2021-06-24 DIAGNOSIS — Z87.19 PERSONAL HISTORY OF OTHER DISEASES OF THE DIGESTIVE SYSTEM: ICD-10-CM

## 2021-06-24 DIAGNOSIS — Z00.00 ENCOUNTER FOR GENERAL ADULT MEDICAL EXAMINATION W/OUT ABNORMAL FINDINGS: ICD-10-CM

## 2021-06-24 DIAGNOSIS — Z83.49 FAMILY HISTORY OF OTHER ENDOCRINE, NUTRITIONAL AND METABOLIC DISEASES: ICD-10-CM

## 2021-06-24 PROBLEM — K86.1 OTHER CHRONIC PANCREATITIS: Chronic | Status: ACTIVE | Noted: 2021-05-26

## 2021-06-24 PROCEDURE — 76376 3D RENDER W/INTRP POSTPROCES: CPT | Mod: 59

## 2021-06-24 PROCEDURE — 76830 TRANSVAGINAL US NON-OB: CPT

## 2021-06-24 PROCEDURE — 99072 ADDL SUPL MATRL&STAF TM PHE: CPT

## 2021-06-24 PROCEDURE — 99215 OFFICE O/P EST HI 40 MIN: CPT

## 2021-06-24 NOTE — HISTORY OF PRESENT ILLNESS
[Vasectomy (partner)] : has a partner with a vasectomy [Y] : Positive pregnancy history [Menarche Age: ____] : age at menarche was [unfilled] [Currently Active] : currently active [Men] : men [Mammogramdate] : 4/17/2019 [TextBox_19] : BR1 [PapSmeardate] : 4/15/2019 [TextBox_31] : ASC-US [BoneDensityDate] : 1/29/2021 [TextBox_37] : NORMAL [LMPDate] : 6/05/2021 [PGHxTotal] : 2 [[facility]] : 2 [[facility] : 2 [FreeTextEntry1] : 6/05/2021

## 2021-06-24 NOTE — ASSESSMENT
[FreeTextEntry1] : 45 yoF, autoimmune hepatitis, cystic fibrosis carrier, h/o acute pancreatitides (about 8-10 since age 10), hypothyroidism, osteoporosis, h/o gastritis (hospitalized 2/2020), CCY. AIH diagnosed in 2016 by her gastroenterologist at the time, Dr. Hood, at the time with bilirubin 7.2, AST//763, ALP, normal IgG 759, pos. anti-actin 69 U, MELISSA+,  and typical findings on liver biopsy in 2016 (scanned). \par \par - AIH, MELISSA, ASMA positive. IgG normal throughout. Course:\par - treatment 2016 for 6 months with prednisone (Dr. Hood), then budesonide 3 mg/d for 4 years until 8/2020            (Dr. Andra Morton, [facility])\par - nausea, vomiting,  RUQ pain, extreme exhaustion/fatigue same as during initial flare in 9/2020, three weeks after budesonide stopped, but only mild elevation of AST/ALT  87/78 and no IgG elevation.  Also creat 1.33. Referred to our office.\par - budesonide resumed 9/29/20, 9 mg/d, with rapid resolution of symptoms, LFTs normalized by 11/2020. Budesonide then tapered, on 3 mg/d since late 11/2020. Likely adrenal insufficiency. \par - 3/2021: significant fatigue, went to bed again after getting children out of the house in the morning. Prednisone increased to 15 mg/d with weekly taper to 12.5, 10, 7.5, then 5 mg/d. Did not follow-up.\par - 5/2021: hospitalization [facility] with mild acute pancreatitis: diffuse abdom. pain, lipase >600 U/L, pain resolved quickly after morphine x 2. CT showed possible mild pancreatic fullness. Also had hyponatremia Na 130 and hyperkalemia K 5.1 (<4.5). I increased prednisone to 10 mg/d after d/c for adrenal insufficiency - may have been relative insufficiency in setting of illness. Pancreatitis possibly due to CBD stone passage (CCY) given very short course, or poss. cystic fibrosis. \par - EGD 5/25/21 done for abdom. varices (MRI 10/2020, liver non-cirrhotic, mild heron dil per Dr. wilber Rossi): no varices, mod. gastritis, one erosion. H. pylori negative. N/V 2-3 days later without resolution until\par - 6/15: hospitalization [facility] x 3d for N/V/pain: lipase 70, CT no cause of pain. Hypothermia 36.1 C, transient leukocytosis WBC 30->20 within 6 hrs, mildly incr. granulocytes and immature grans. Again rapid resolution of pain morphine given x 2. Also hyponatremia, hyperkalemia PAZ creat 1.8, hyperbilirubinemia 2.2, nl other LFTs (only one timepoint). DD: infection, adrenal insufficiency, very mild pancreatitis. Pred 10 continued, pantoprazole incr to 40.\par - 6/24/2021: feeling well. \par \par - 1/19/21 fibroscan: no fibrosis, no steatosis - 3.8 kPa, 217 dB/m (report reviewed, interpretation and chart note corrected).\par - 3/2021 US abdomen: fatty liver, no gallstones, CCY.\par - likely intolerant of azathioprine - acute pancreatitis after initiation\par - longstanding h/o being overweight, BMI 26.1 after 10 lbs weight loss in 6 weeks by dieting\par \par - PAZ, again due to recent dehydration. eGFR 61 - suggestive of CKD2. Mother had one atrophic kidney. No HTN. No h/o significant NSAID intake. UA normal 1/19/21.\par - transient bilirubin elevation in January: DD includes Gilbert's disease. Elevation in May: suspect gallstone passage.\par \par - immune to hepatitis B due to vaccination, not immune to hepatitis A\par \par She follows with Dr. Hood's colleague.\par Records of two recent hosptializations reviewed on pt's cell phone. Ultrasound, EGD and pathology reviewed. I discussed again that steroid-induced pancreatitis is uncertain in general and unlikely in her case, and that steroids should be lowered because she doesn't need it for autoimmune hepatitis (no recent flare), but that they need to be tapered slowly, allowing her adrenal glands to "kick in" again.\par \par Plan:\par - hypoaldosteronism, hyperthyroidism, AIH: bloodwork today. She will call to discuss taper of prednisone tomorrow. If no hyponatremia and/or hyperkalemia, will taper slowly: 9 mg/d x 2 weeks, then 8 mg/d, then return to office. IgG today and before next visit.\par - recurrent pancreatitis, FHx of cystic fibrosis in her son: sputum test for cystic fibrosis.\par - hepatitis A vaccination: will plan at our office as she has had difficulty getting it.

## 2021-06-24 NOTE — HISTORY OF PRESENT ILLNESS
[Autoimmune Disorder] : autoimmune disorder [Needlestick Exposure] : no needlestick exposure [Infected Sexual Partner] : no infected sexual partner [IV Drug Use] : no IV drug use [Tattoo] : no tattoos [Body Piercing] : no body piercing [Hemodialysis] : no hemodialysis [Transfusion before 1992] : no transfusion before 1992 [Transplant before 1992] : no transplant before 1992 [Alcohol Abuse] : no alcohol abuse [Household Contact to HBV] : no household contact to HBV [Travel to Endemic Area] : no travel to an endemic area [Occupational Exposure] : no occupational exposure [Cocaine Use] : no cocaine use [de-identified] : - 6/24/21: returns after weekly taper of prednisone from 15 mg/d to 5 mg/d over 5 weeks after March visit, repeat bloodwork in April, hospitalization in May for 3 days at [facility] with generalized abdominal pain, nausea and vomiting, diagnosed with mild acute pancreatitis. CT report reviewed: potential mild prominence of prox and mid pancreas. Lipase 5/18: 689 elevated, LFTs normal, hyponatremia Na 130 and hyperkalemia K 5.1. Treated with morphine x2. I then increased prednisone to 10 mg/d for adrenal insufficiency, but nausea didn't improve much; then EGD 5/25 - I prescribed pantoprazole 20 mg/d for gastritis with erosion. She had recurrent pain 2-3 days after the EGD and nausea & vomiting again but stayed at home for two weeks feeling like crap until hospitalization 6/14-17, told that it was gastritis and pantoprazole was increased to 40 mg/d. Hospital records (Pt's cell phone): labs 6/15: hypothermia 36.1 C, WBC 29.9 at 1am with 62% neutros and 1.1% immature grans (elevated), then 20 at 7am, Na 130, K 5.1 - hyponatremia and hyperkalemia, bili 2.2 elevated. No f/u labs available. CT 6/15 report: CCY, no cause of pain. She had hyperkalemia 5.1 (<=4.5) and PAZ creat 1.8. She was given morphine again x 2. Her level of energy has been ok throughout all of this. \par Prednisone was not changed - taking 10 mg/d. Sleeps better - from 21:00-6:00. No nausea recently. \par \par - 3/18/21: returns after repeat labs, done on 3/3, and seeing Dr. Mayer. Taking budesonide. Feels more fatigue than when steroid started at higher dose, goes to bed at 8pm, sleeps until she gets her kids out of the house, then sleeps another hour or so. \par \par - 2/4/21: returns after bloodwork last visit, UA, and fibroscan. Taking budesonide 3 mg/d, feels normal. EGD not done.\par \par - 1/14/21: returns after 2 months. Recent bloodwork not done. She postponed the EGD close to her home until after her 2nd COVID shot.\par \par - Ms. HORNE is a 45 year old woman with h/o acute pancreatitides (about 8-10 since age 10), hypothyroidism, osteoporosis, h/o gastritis (hospitalized 2/2020), CCY, who  was diagnosed with AIH by her gastroenterologist at the time, Dr. Hood, after she developed dark urine, jaundice, fatigue and some vomiting, and was found to have elevated LFTs with bilirubin 7.2, AST//763, ALP, pos. anti-actin 69 U, MELISSA+,  based on a liver biopsy. She saw Dr. Naranjo at our office for a second opinion after she had developed side-effects to treatment with prednisone, initially 60 mg/d. LFTs had improved. Dr. Naranjo ordered bloodwork, planned to review the liver biopsy, and agreed with tapering prednisone, and recommended the addition of azathioprine. \par The patient did not follow-up at that time. Labs form 9/2016 show AST/ALT mildly elevated, and negative MELISSA, ASMA and normal IgG of 759. She was treated with prednisone for 6 months, then followed with Dr. Andra Morton at [facility] and wishes to transfer care here as it is closer to her home.\par She developed acute pancreatitis to azathioprine and was treated with budesonide 3 mg/d until 8/2020. She developed persistent nausea and vomiting about a week later that lasted for more than three weeks, and RUQ pain, and extreme exhaustion and fatigue. Her gastroenterologist found elevated AST/ALT 87/78, K 4.3, Na 136, Creat 1.33, BUN 20, and resumed budesonide 9 mg/d on 9/29/20. She rapidly improved and has been feeling normally recently. Recent LFTs were normal as below. \par \par Weight hx: 138 lbs, BMI 26.1 after 10 lbs weight loss in 6 weeks by dieting. Max. weight was 148 lbs. Longstanding overweight 130-140 lbs.\par Alcohol hx: does not drink, never did, because of her pancreatitides.\par \par Workup: \par - 5/26/21 EGD: moderate gastric erythema, one erosion. Biopsied. Dilated lacteals duodenum, biopsied. PPI prescribed. Path: chronic active gastritis with intestinal metaplasia. Duodenum normal. \par - 1/19/21 fibroscan: 3.8 kPa, 217 dB/m - stage 0 fibrosis, stage 0 steatosis. Interpretation corrected, also values and interpretation in chart note from 1/19/21.\par - 10/12/20 MRI abdomen wwo ([facility]): normal liver morphology. Vessels patent. Coronary varices, suggestive of portal HTN.\par - 8/4/16 liver biopsy ([facility]; scanned): moderate to marked inflammatory infiltrate involving portal areas with interface activity and extends into lobules. [...] Lymphocytes, plasma cells, eosinophils, some neutrophils., including rare neutrophils in small bile ducts. Lobular cholestasis is also present. No evidence of fibrosis. [...]not entirely characteristic, given pos. MELISSA, AMA, compatible with AIH.

## 2021-06-24 NOTE — CONSULT LETTER
[Dear  ___] : Dear  [unfilled], [Consult Letter:] : I had the pleasure of evaluating your patient, [unfilled]. [Please see my note below.] : Please see my note below. [Consult Closing:] : Thank you very much for allowing me to participate in the care of this patient.  If you have any questions, please do not hesitate to contact me. [FreeTextEntry2] : NICKY MALHOTRA (PCP)\par  [FreeTextEntry3] : Waldemar Gaming MD\par , [facility]\par General Hepatology and Gastroenterology\par [facility]\par [facility]\par [address] [place] [address]\[place] [address]\par [phone]\par

## 2021-06-24 NOTE — ASSESSMENT
[FreeTextEntry1] : 45 yoF, autoimmune hepatitis, cystic fibrosis carrier, h/o acute pancreatitides (about 8-10 since age 10), hypothyroidism, osteoporosis, h/o gastritis (hospitalized 2/2020), CCY. AIH diagnosed in 2016 by her gastroenterologist at the time, Dr. Hood, at the time with bilirubin 7.2, AST//763, ALP, normal IgG 759, pos. anti-actin 69 U, MELISSA+,  and typical findings on liver biopsy in 2016 (scanned). \par \par - AIH, MELISSA, ASMA positive. IgG normal throughout. Course:\par - treatment 2016 for 6 months with prednisone (Dr. Hood), then budesonide 3 mg/d for 4 years until 8/2020            (Dr. Andra Morton, [facility])\par - nausea, vomiting,  RUQ pain, extreme exhaustion/fatigue same as during initial flare in 9/2020, three weeks after budesonide stopped, but only mild elevation of AST/ALT  87/78 and no IgG elevation.  Also creat 1.33. Referred to our office.\par - budesonide resumed 9/29/20, 9 mg/d, with rapid resolution of symptoms, LFTs normalized by 11/2020. Budesonide then tapered, on 3 mg/d since late 11/2020. Likely adrenal insufficiency. \par - 3/2021: significant fatigue, went to bed again after getting children out of the house in the morning. Prednisone increased to 15 mg/d with weekly taper to 12.5, 10, 7.5, then 5 mg/d. Did not follow-up.\par - 5/2021: hospitalization [facility] with mild acute pancreatitis: diffuse abdom. pain, lipase >600 U/L, pain resolved quickly after morphine x 2. CT showed possible mild pancreatic fullness. Also had hyponatremia Na 130 and hyperkalemia K 5.1 (<4.5). I increased prednisone to 10 mg/d after d/c for adrenal insufficiency - may have been relative insufficiency in setting of illness. Pancreatitis possibly due to CBD stone passage (CCY) given very short course, or poss. cystic fibrosis. \par - EGD 5/25/21 done for abdom. varices (MRI 10/2020, liver non-cirrhotic, mild heron dil per Dr. wilber Rossi): no varices, mod. gastritis, one erosion. H. pylori negative. N/V 2-3 days later without resolution until\par - 6/15: hospitalization [facility] x 3d for N/V/pain: lipase 70, CT no cause of pain. Hypothermia 36.1 C, transient leukocytosis WBC 30->20 within 6 hrs, mildly incr. granulocytes and immature grans. Again rapid resolution of pain morphine given x 2. Also hyponatremia, hyperkalemia PAZ creat 1.8, hyperbilirubinemia 2.2, nl other LFTs (only one timepoint). DD: infection, adrenal insufficiency, very mild pancreatitis. Pred 10 continued, pantoprazole incr to 40.\par - 6/24/2021: feeling well. \par \par - 1/19/21 fibroscan: no fibrosis, no steatosis - 3.8 kPa, 217 dB/m (report reviewed, interpretation and chart note corrected).\par - 3/2021 US abdomen: fatty liver, no gallstones, CCY.\par - likely intolerant of azathioprine - acute pancreatitis after initiation\par - longstanding h/o being overweight, BMI 26.1 after 10 lbs weight loss in 6 weeks by dieting\par \par - PAZ, again due to recent dehydration. eGFR 61 - suggestive of CKD2. Mother had one atrophic kidney. No HTN. No h/o significant NSAID intake. UA normal 1/19/21.\par - transient bilirubin elevation in January: DD includes Gilbert's disease. Elevation in May: suspect gallstone passage.\par \par - immune to hepatitis B due to vaccination, not immune to hepatitis A\par \par She follows with Dr. Hood's colleague.\par Records of two recent hosptializations reviewed on pt's cell phone. Ultrasound, EGD and pathology reviewed. I discussed again that steroid-induced pancreatitis is uncertain in general and unlikely in her case, and that steroids should be lowered because she doesn't need it for autoimmune hepatitis (no recent flare), but that they need to be tapered slowly, allowing her adrenal glands to "kick in" again.\par \par Plan:\par - hypoaldosteronism, hyperthyroidism, AIH: bloodwork today. She will call to discuss taper of prednisone tomorrow. If no hyponatremia and/or hyperkalemia, will taper slowly: 9 mg/d x 2 weeks, then 8 mg/d, then return to office. IgG today and before next visit.\par - recurrent pancreatitis, FHx of cystic fibrosis in her son: sputum test for cystic fibrosis.\par - hepatitis A vaccination: will plan at our office as she has had difficulty getting it.

## 2021-06-24 NOTE — END OF VISIT
[FreeTextEntry3] : I, Nicol Shah, solely acted as scribe for Dr. Zhane Henriquez on 06/24/2021.\par All medical entries made by the Scribe were at my, Dr. Henriquez's, direction and personally dictated by me on 06/24/2021. I have reviewed the chart and agree that the record accurately reflects my personal performance of the history, physical exam, assessment and plan. I have also personally directed, reviewed, and agreed with the chart.

## 2021-06-24 NOTE — CONSULT LETTER
[Dear  ___] : Dear  [unfilled], [Consult Letter:] : I had the pleasure of evaluating your patient, [unfilled]. [Please see my note below.] : Please see my note below. [Consult Closing:] : Thank you very much for allowing me to participate in the care of this patient.  If you have any questions, please do not hesitate to contact me. [FreeTextEntry2] : NICKY MALHOTRA (PCP)\par  [FreeTextEntry3] : Waldemar Gaming MD\par , [facility]\par General Hepatology and Gastroenterology\par [facility]\par [facility]\par [address] [place] [address]\[place] [address]\par [phone]\par

## 2021-06-24 NOTE — HISTORY OF PRESENT ILLNESS
[Autoimmune Disorder] : autoimmune disorder [Needlestick Exposure] : no needlestick exposure [Infected Sexual Partner] : no infected sexual partner [IV Drug Use] : no IV drug use [Tattoo] : no tattoos [Body Piercing] : no body piercing [Hemodialysis] : no hemodialysis [Transfusion before 1992] : no transfusion before 1992 [Transplant before 1992] : no transplant before 1992 [Alcohol Abuse] : no alcohol abuse [Household Contact to HBV] : no household contact to HBV [Travel to Endemic Area] : no travel to an endemic area [Occupational Exposure] : no occupational exposure [Cocaine Use] : no cocaine use [de-identified] : - 6/24/21: returns after weekly taper of prednisone from 15 mg/d to 5 mg/d over 5 weeks after March visit, repeat bloodwork in April, hospitalization in May for 3 days at [facility] with generalized abdominal pain, nausea and vomiting, diagnosed with mild acute pancreatitis. CT report reviewed: potential mild prominence of prox and mid pancreas. Lipase 5/18: 689 elevated, LFTs normal, hyponatremia Na 130 and hyperkalemia K 5.1. Treated with morphine x2. I then increased prednisone to 10 mg/d for adrenal insufficiency, but nausea didn't improve much; then EGD 5/25 - I prescribed pantoprazole 20 mg/d for gastritis with erosion. She had recurrent pain 2-3 days after the EGD and nausea & vomiting again but stayed at home for two weeks feeling like crap until hospitalization 6/14-17, told that it was gastritis and pantoprazole was increased to 40 mg/d. Hospital records (Pt's cell phone): labs 6/15: hypothermia 36.1 C, WBC 29.9 at 1am with 62% neutros and 1.1% immature grans (elevated), then 20 at 7am, Na 130, K 5.1 - hyponatremia and hyperkalemia, bili 2.2 elevated. No f/u labs available. CT 6/15 report: CCY, no cause of pain. She had hyperkalemia 5.1 (<=4.5) and PAZ creat 1.8. She was given morphine again x 2. Her level of energy has been ok throughout all of this. \par Prednisone was not changed - taking 10 mg/d. Sleeps better - from 21:00-6:00. No nausea recently. \par \par - 3/18/21: returns after repeat labs, done on 3/3, and seeing Dr. Mayer. Taking budesonide. Feels more fatigue than when steroid started at higher dose, goes to bed at 8pm, sleeps until she gets her kids out of the house, then sleeps another hour or so. \par \par - 2/4/21: returns after bloodwork last visit, UA, and fibroscan. Taking budesonide 3 mg/d, feels normal. EGD not done.\par \par - 1/14/21: returns after 2 months. Recent bloodwork not done. She postponed the EGD close to her home until after her 2nd COVID shot.\par \par - Ms. HORNE is a 45 year old woman with h/o acute pancreatitides (about 8-10 since age 10), hypothyroidism, osteoporosis, h/o gastritis (hospitalized 2/2020), CCY, who  was diagnosed with AIH by her gastroenterologist at the time, Dr. Hood, after she developed dark urine, jaundice, fatigue and some vomiting, and was found to have elevated LFTs with bilirubin 7.2, AST//763, ALP, pos. anti-actin 69 U, MELISSA+,  based on a liver biopsy. She saw Dr. Naranjo at our office for a second opinion after she had developed side-effects to treatment with prednisone, initially 60 mg/d. LFTs had improved. Dr. Naranjo ordered bloodwork, planned to review the liver biopsy, and agreed with tapering prednisone, and recommended the addition of azathioprine. \par The patient did not follow-up at that time. Labs form 9/2016 show AST/ALT mildly elevated, and negative MELISSA, ASMA and normal IgG of 759. She was treated with prednisone for 6 months, then followed with Dr. Andra Morton at [facility] and wishes to transfer care here as it is closer to her home.\par She developed acute pancreatitis to azathioprine and was treated with budesonide 3 mg/d until 8/2020. She developed persistent nausea and vomiting about a week later that lasted for more than three weeks, and RUQ pain, and extreme exhaustion and fatigue. Her gastroenterologist found elevated AST/ALT 87/78, K 4.3, Na 136, Creat 1.33, BUN 20, and resumed budesonide 9 mg/d on 9/29/20. She rapidly improved and has been feeling normally recently. Recent LFTs were normal as below. \par \par Weight hx: 138 lbs, BMI 26.1 after 10 lbs weight loss in 6 weeks by dieting. Max. weight was 148 lbs. Longstanding overweight 130-140 lbs.\par Alcohol hx: does not drink, never did, because of her pancreatitides.\par \par Workup: \par - 5/26/21 EGD: moderate gastric erythema, one erosion. Biopsied. Dilated lacteals duodenum, biopsied. PPI prescribed. Path: chronic active gastritis with intestinal metaplasia. Duodenum normal. \par - 1/19/21 fibroscan: 3.8 kPa, 217 dB/m - stage 0 fibrosis, stage 0 steatosis. Interpretation corrected, also values and interpretation in chart note from 1/19/21.\par - 10/12/20 MRI abdomen wwo ([facility]): normal liver morphology. Vessels patent. Coronary varices, suggestive of portal HTN.\par - 8/4/16 liver biopsy ([facility]; scanned): moderate to marked inflammatory infiltrate involving portal areas with interface activity and extends into lobules. [...] Lymphocytes, plasma cells, eosinophils, some neutrophils., including rare neutrophils in small bile ducts. Lobular cholestasis is also present. No evidence of fibrosis. [...]not entirely characteristic, given pos. MELISSA, AMA, compatible with AIH.

## 2021-06-24 NOTE — DISCUSSION/SUMMARY
[FreeTextEntry1] : 1) AUB\par -Reviewed hormone levels from 2021, all within normal limits\par -Recommended initial workup with hysteroscopy with EMB vs D&C hysteroscopy under sedation for further evaluation. Procedure details, r/b/a were discussed. Risks discussed include but are not limited to pain, bleeding, infection, uterine perforation, and injury to nearby organs. She desires office hysteroscopy with EMB.\par -Further medical management options were discussed including the use of OCPs, Depo-Provera, Nexplanon, and IUD. R/b/a were discussed. Risks discussed include but are not limited to headache, mood swings, breast tenderness, weight gain, menstrual cycle changes, acne, ovarian cysts, and blood clots. \par -Surgical management options were also discussed, namely endometrial ablation vs hysterectomy. Procedure details, r/b/a were discussed. Risks discussed include but are not limited to pain, bleeding, infection, uterine perforation, and injury to nearby organs.\par -She desires hysterectomy in the near future. She expressed understanding that EMB is needed prior to scheduling hysterectomy.\par \par 2) Ovarian cysts\par -Pelvic sono from today, 21: A complex posterior vaginal wall cyst is seen 2.4 x 1.3 x 1.8 cm, anteverted uterus with an anterior TEOFILO  scan creating a nodular appearance, hyperechoic endo is seen with a thickness of 1.1 cm, polyp cannot be ruled out, normal appearing RT ovary, cyst no longer seen, complex LT ovary cyst is now seen 4.1 cm, new and a simple LT ovary cyst is again seen, unchanged 2.9 cm, no free fluid seen.\par -Will repeat sono in 2 months to follow ovarian cysts. Ovarian torsion precautions and call parameters were reviewed. Patient expressed understanding. \par \par Follow up in 1-2 weeks for annual gyn exam and hysteroscopy with EMB. She will also follow up in 2 months for repeat sono to monitor ovarian cysts. All questions were answered.

## 2021-06-25 ENCOUNTER — NON-APPOINTMENT (OUTPATIENT)
Age: 46
End: 2021-06-25

## 2021-06-25 ENCOUNTER — LABORATORY RESULT (OUTPATIENT)
Age: 46
End: 2021-06-25

## 2021-06-28 LAB
ALBUMIN SERPL ELPH-MCNC: 4.5 G/DL
ALP BLD-CCNC: 56 U/L
ALT SERPL-CCNC: 17 U/L
ANION GAP SERPL CALC-SCNC: 19 MMOL/L
AST SERPL-CCNC: 14 U/L
BASOPHILS # BLD AUTO: 0.07 K/UL
BASOPHILS NFR BLD AUTO: 0.4 %
BILIRUB SERPL-MCNC: 0.9 MG/DL
BUN SERPL-MCNC: 13 MG/DL
CALCIUM SERPL-MCNC: 8.2 MG/DL
CHLORIDE SERPL-SCNC: 98 MMOL/L
CHOLEST SERPL-MCNC: 253 MG/DL
CO2 SERPL-SCNC: 19 MMOL/L
CREAT SERPL-MCNC: 1.24 MG/DL
EOSINOPHIL # BLD AUTO: 0.13 K/UL
EOSINOPHIL NFR BLD AUTO: 0.8 %
GLUCOSE SERPL-MCNC: 25 MG/DL
HCT VFR BLD CALC: 41.1 %
HDLC SERPL-MCNC: 60 MG/DL
HGB BLD-MCNC: 12.8 G/DL
IMM GRANULOCYTES NFR BLD AUTO: 0.6 %
LDLC SERPL CALC-MCNC: 133 MG/DL
LYMPHOCYTES # BLD AUTO: 7.09 K/UL
LYMPHOCYTES NFR BLD AUTO: 41.9 %
MAN DIFF?: NORMAL
MCHC RBC-ENTMCNC: 30.5 PG
MCHC RBC-ENTMCNC: 31.1 GM/DL
MCV RBC AUTO: 98.1 FL
MONOCYTES # BLD AUTO: 1.09 K/UL
MONOCYTES NFR BLD AUTO: 6.4 %
NEUTROPHILS # BLD AUTO: 8.46 K/UL
NEUTROPHILS NFR BLD AUTO: 49.9 %
NONHDLC SERPL-MCNC: 193 MG/DL
PLATELET # BLD AUTO: 551 K/UL
POTASSIUM SERPL-SCNC: 5.3 MMOL/L
PROT SERPL-MCNC: 6.7 G/DL
RBC # BLD: 4.19 M/UL
RBC # FLD: 14.5 %
SODIUM SERPL-SCNC: 136 MMOL/L
TRIGL SERPL-MCNC: 300 MG/DL
TSH SERPL-ACNC: 0.87 UIU/ML
WBC # FLD AUTO: 16.94 K/UL

## 2021-06-29 LAB — IGG SER QL IEP: 807 MG/DL

## 2021-07-01 LAB
ALBUMIN SERPL ELPH-MCNC: 4.1 G/DL
ALP BLD-CCNC: 57 U/L
ALT SERPL-CCNC: 15 U/L
ANION GAP SERPL CALC-SCNC: 17 MMOL/L
AST SERPL-CCNC: 15 U/L
BILIRUB SERPL-MCNC: 0.9 MG/DL
BUN SERPL-MCNC: 14 MG/DL
CALCIUM SERPL-MCNC: 9.3 MG/DL
CHLORIDE SERPL-SCNC: 100 MMOL/L
CO2 SERPL-SCNC: 21 MMOL/L
CREAT SERPL-MCNC: 1.23 MG/DL
GLUCOSE SERPL-MCNC: 33 MG/DL
POTASSIUM SERPL-SCNC: 5 MMOL/L
PROT SERPL-MCNC: 6.5 G/DL
SODIUM SERPL-SCNC: 139 MMOL/L

## 2021-07-08 ENCOUNTER — LABORATORY RESULT (OUTPATIENT)
Age: 46
End: 2021-07-08

## 2021-07-09 LAB
25(OH)D3 SERPL-MCNC: 35.5 NG/ML
ALBUMIN SERPL ELPH-MCNC: 4.4 G/DL
ALP BLD-CCNC: 58 U/L
ALT SERPL-CCNC: 18 U/L
ANION GAP SERPL CALC-SCNC: 16 MMOL/L
AST SERPL-CCNC: 17 U/L
BASOPHILS # BLD AUTO: 0 K/UL
BASOPHILS NFR BLD AUTO: 0 %
BILIRUB SERPL-MCNC: 1.1 MG/DL
BUN SERPL-MCNC: 19 MG/DL
CALCIUM SERPL-MCNC: 9.6 MG/DL
CHLORIDE SERPL-SCNC: 101 MMOL/L
CO2 SERPL-SCNC: 20 MMOL/L
CREAT SERPL-MCNC: 1.29 MG/DL
EOSINOPHIL # BLD AUTO: 0 K/UL
EOSINOPHIL NFR BLD AUTO: 0 %
GLUCOSE SERPL-MCNC: 72 MG/DL
HCT VFR BLD CALC: 41.4 %
HGB BLD-MCNC: 13.3 G/DL
LYMPHOCYTES # BLD AUTO: 7.18 K/UL
LYMPHOCYTES NFR BLD AUTO: 33.9 %
MAN DIFF?: NORMAL
MCHC RBC-ENTMCNC: 31.7 PG
MCHC RBC-ENTMCNC: 32.1 GM/DL
MCV RBC AUTO: 98.6 FL
MONOCYTES # BLD AUTO: 1.29 K/UL
MONOCYTES NFR BLD AUTO: 6.1 %
NEUTROPHILS # BLD AUTO: 12.16 K/UL
NEUTROPHILS NFR BLD AUTO: 57.4 %
PLATELET # BLD AUTO: 599 K/UL
POTASSIUM SERPL-SCNC: 4.9 MMOL/L
PROT SERPL-MCNC: 6.8 G/DL
RBC # BLD: 4.2 M/UL
RBC # FLD: 14.3 %
SODIUM SERPL-SCNC: 137 MMOL/L
T3 SERPL-MCNC: 80 NG/DL
T4 FREE SERPL-MCNC: 1.8 NG/DL
TSH SERPL-ACNC: 1.55 UIU/ML
WBC # FLD AUTO: 21.18 K/UL

## 2021-07-12 ENCOUNTER — NON-APPOINTMENT (OUTPATIENT)
Age: 46
End: 2021-07-12

## 2021-07-12 ENCOUNTER — APPOINTMENT (OUTPATIENT)
Dept: ENDOCRINOLOGY | Facility: CLINIC | Age: 46
End: 2021-07-12
Payer: COMMERCIAL

## 2021-07-12 VITALS
HEIGHT: 60 IN | SYSTOLIC BLOOD PRESSURE: 102 MMHG | BODY MASS INDEX: 27.48 KG/M2 | WEIGHT: 140 LBS | OXYGEN SATURATION: 98 % | HEART RATE: 87 BPM | DIASTOLIC BLOOD PRESSURE: 60 MMHG

## 2021-07-12 PROCEDURE — 99072 ADDL SUPL MATRL&STAF TM PHE: CPT

## 2021-07-12 PROCEDURE — 99214 OFFICE O/P EST MOD 30 MIN: CPT

## 2021-07-12 NOTE — PHYSICAL EXAM
[Alert] : alert [Well Nourished] : well nourished [No Acute Distress] : no acute distress [Well Developed] : well developed [Normal Sclera/Conjunctiva] : normal sclera/conjunctiva [EOMI] : extra ocular movement intact [No Proptosis] : no proptosis [Normal Oropharynx] : the oropharynx was normal [Thyroid Not Enlarged] : the thyroid was not enlarged [No Thyroid Nodules] : no palpable thyroid nodules [No Respiratory Distress] : no respiratory distress [No Accessory Muscle Use] : no accessory muscle use [Clear to Auscultation] : lungs were clear to auscultation bilaterally [Normal S1, S2] : normal S1 and S2 [Normal Rate] : heart rate was normal [Regular Rhythm] : with a regular rhythm [No Edema] : no peripheral edema [Pedal Pulses Normal] : the pedal pulses are present [Normal Bowel Sounds] : normal bowel sounds [Not Tender] : non-tender [Not Distended] : not distended [Soft] : abdomen soft [Normal Anterior Cervical Nodes] : no anterior cervical lymphadenopathy [Normal Gait] : normal gait [No Rash] : no rash [No Tremors] : no tremors [Oriented x3] : oriented to person, place, and time [Acanthosis Nigricans] : no acanthosis nigricans

## 2021-07-12 NOTE — ASSESSMENT
[Carbohydrate Consistent Diet] : carbohydrate consistent diet [Exercise/Effect on Glucose] : exercise/effect on glucose [FreeTextEntry1] : HYpoT /autoimmune thyroiditis \par pt euthyroid clinically and biochemically.\par increase Lt4 100 mcg qd and take 1.5 pill on Sundays  \par Take daily in AM on an empty stomach at least 30 minutes before eating or taking other medication.\par \par NTMNG : no compressive sx like dysphagia or change in voice \par FNA nondiagnostic repeatedly of L dominant nodule bc heavily calcified. \par check neck US and will call pt with results. \par \par abnormal CBC: higH WBC and high T. referral to hemATOLOGY for further workup. \par \par vitamin d defic: cont  vitamin d to 2000 u qd \par \par overweighT: lost 6 lbs and gained back  \par Encouraged more exercise walking 30 min 3 x week\par \par osteoporosis : prednisone orally daily for autoimmune hepatitis .\par continue calcium 500 mg BID \par DXA shows some osteopenia\par

## 2021-07-21 ENCOUNTER — RX RENEWAL (OUTPATIENT)
Age: 46
End: 2021-07-21

## 2021-07-21 ENCOUNTER — APPOINTMENT (OUTPATIENT)
Dept: OBGYN | Facility: CLINIC | Age: 46
End: 2021-07-21

## 2021-07-28 ENCOUNTER — NON-APPOINTMENT (OUTPATIENT)
Age: 46
End: 2021-07-28

## 2021-08-19 ENCOUNTER — APPOINTMENT (OUTPATIENT)
Dept: OBGYN | Facility: CLINIC | Age: 46
End: 2021-08-19

## 2021-09-08 ENCOUNTER — APPOINTMENT (OUTPATIENT)
Dept: HEPATOLOGY | Facility: CLINIC | Age: 46
End: 2021-09-08
Payer: COMMERCIAL

## 2021-09-08 DIAGNOSIS — Z23 ENCOUNTER FOR IMMUNIZATION: ICD-10-CM

## 2021-09-08 PROCEDURE — 90471 IMMUNIZATION ADMIN: CPT

## 2021-09-08 PROCEDURE — 90632 HEPA VACCINE ADULT IM: CPT

## 2021-09-14 ENCOUNTER — APPOINTMENT (OUTPATIENT)
Dept: HEPATOLOGY | Facility: CLINIC | Age: 46
End: 2021-09-14

## 2021-09-28 RX ORDER — PREDNISONE 5 MG/1
5 TABLET ORAL DAILY
Qty: 30 | Refills: 3 | Status: DISCONTINUED | COMMUNITY
End: 2021-09-28

## 2021-11-09 ENCOUNTER — APPOINTMENT (OUTPATIENT)
Dept: HEPATOLOGY | Facility: CLINIC | Age: 46
End: 2021-11-09
Payer: COMMERCIAL

## 2021-11-09 VITALS
SYSTOLIC BLOOD PRESSURE: 120 MMHG | OXYGEN SATURATION: 98 % | HEIGHT: 60 IN | TEMPERATURE: 98 F | DIASTOLIC BLOOD PRESSURE: 79 MMHG | WEIGHT: 142 LBS | HEART RATE: 62 BPM | BODY MASS INDEX: 27.88 KG/M2

## 2021-11-09 VITALS
HEIGHT: 60 IN | SYSTOLIC BLOOD PRESSURE: 156 MMHG | RESPIRATION RATE: 16 BRPM | OXYGEN SATURATION: 99 % | BODY MASS INDEX: 28.3 KG/M2 | WEIGHT: 144.13 LBS | DIASTOLIC BLOOD PRESSURE: 72 MMHG | TEMPERATURE: 98.1 F | HEART RATE: 72 BPM

## 2021-11-09 DIAGNOSIS — Z87.19 PERSONAL HISTORY OF OTHER DISEASES OF THE DIGESTIVE SYSTEM: ICD-10-CM

## 2021-11-09 PROCEDURE — 99214 OFFICE O/P EST MOD 30 MIN: CPT

## 2021-11-09 NOTE — CONSULT LETTER
[Dear  ___] : Dear  [unfilled], [Consult Letter:] : I had the pleasure of evaluating your patient, [unfilled]. [Please see my note below.] : Please see my note below. [Consult Closing:] : Thank you very much for allowing me to participate in the care of this patient.  If you have any questions, please do not hesitate to contact me. [FreeTextEntry2] : NICKY MALHOTRA (PCP)\par  [FreeTextEntry3] : Waldemar Gaming MD\par , [facility]\par General Hepatology and Gastroenterology\par [facility]\par [facility]\par [address] [place] [address]\[place] [address]\par [phone]\par

## 2021-11-09 NOTE — HISTORY OF PRESENT ILLNESS
[Autoimmune Disorder] : autoimmune disorder [Needlestick Exposure] : no needlestick exposure [Infected Sexual Partner] : no infected sexual partner [IV Drug Use] : no IV drug use [Tattoo] : no tattoos [Body Piercing] : no body piercing [Hemodialysis] : no hemodialysis [Transfusion before 1992] : no transfusion before 1992 [Transplant before 1992] : no transplant before 1992 [Alcohol Abuse] : no alcohol abuse [Household Contact to HBV] : no household contact to HBV [Travel to Endemic Area] : no travel to an endemic area [Occupational Exposure] : no occupational exposure [Cocaine Use] : no cocaine use [de-identified] : - 11/9/21: returns after 3 months instead of one. Taking prednisone 5 mg/d, after tapering from 9 mg/d in 1 mg/d decrements for 2 weeks each. At 4 mg/d, she had nausea three times after 3-4 days, and has been taking 5 m/d for the last 3 weeks. She saw another hematologist and was given VitB12 shots for pernicious anemia for the last 3 weeks- has not felt a difference. She does not have to sleep during the day anymore, but does feel tired. Sleeps 9 hrs/day.\par \par - 6/24/21: returns after weekly taper of prednisone from 15 mg/d to 5 mg/d over 5 weeks after March visit, repeat bloodwork in April, hospitalization in May for 3 days at [facility] with generalized abdominal pain, nausea and vomiting, diagnosed with mild acute pancreatitis. CT report reviewed: potential mild prominence of prox and mid pancreas. Lipase 5/18: 689 elevated, LFTs normal, hyponatremia Na 130 and hyperkalemia K 5.1. Treated with morphine x2. I then increased prednisone to 10 mg/d for adrenal insufficiency, but nausea didn't improve much; then EGD 5/25 - I prescribed pantoprazole 20 mg/d for gastritis with erosion. She had recurrent pain 2-3 days after the EGD and nausea & vomiting again but stayed at home for two weeks feeling like crap until hospitalization 6/14-17, told that it was gastritis and pantoprazole was increased to 40 mg/d. Hospital records (Pt's cell phone): labs 6/15: hypothermia 36.1 C, WBC 29.9 at 1am with 62% neutros and 1.1% immature grans (elevated), then 20 at 7am, Na 130, K 5.1 - hyponatremia and hyperkalemia, bili 2.2 elevated. No f/u labs available. CT 6/15 report: CCY, no cause of pain. She had hyperkalemia 5.1 (<=4.5) and PAZ creat 1.8. She was given morphine again x 2. Her level of energy has been ok throughout all of this. \par Prednisone was not changed - taking 10 mg/d. Sleeps better - from 21:00-6:00. No nausea recently. \par \par - 3/18/21: returns after repeat labs, done on 3/3, and seeing Dr. Mayer. Taking budesonide. Feels more fatigue than when steroid started at higher dose, goes to bed at 8pm, sleeps until she gets her kids out of the house, then sleeps another hour or so. \par \par - 2/4/21: returns after bloodwork last visit, UA, and fibroscan. Taking budesonide 3 mg/d, feels normal. EGD not done.\par \par - 1/14/21: returns after 2 months. Recent bloodwork not done. She postponed the EGD close to her home until after her 2nd COVID shot.\par \par - Ms. HORNE is a 45 year old woman with h/o acute pancreatitides (about 8-10 since age 10), hypothyroidism, osteoporosis, h/o gastritis (hospitalized 2/2020), CCY, who  was diagnosed with AIH by her gastroenterologist at the time, Dr. Hood, after she developed dark urine, jaundice, fatigue and some vomiting, and was found to have elevated LFTs with bilirubin 7.2, AST//763, ALP, pos. anti-actin 69 U, MELISSA+,  based on a liver biopsy. She saw Dr. Naranjo at our office for a second opinion after she had developed side-effects to treatment with prednisone, initially 60 mg/d. LFTs had improved. Dr. Naranjo ordered bloodwork, planned to review the liver biopsy, and agreed with tapering prednisone, and recommended the addition of azathioprine. \par The patient did not follow-up at that time. Labs form 9/2016 show AST/ALT mildly elevated, and negative MELISSA, ASMA and normal IgG of 759. She was treated with prednisone for 6 months, then followed with Dr. Andra Morton at [facility] and wishes to transfer care here as it is closer to her home.\par She developed acute pancreatitis to azathioprine and was treated with budesonide 3 mg/d until 8/2020. She developed persistent nausea and vomiting about a week later that lasted for more than three weeks, and RUQ pain, and extreme exhaustion and fatigue. Her gastroenterologist found elevated AST/ALT 87/78, K 4.3, Na 136, Creat 1.33, BUN 20, and resumed budesonide 9 mg/d on 9/29/20. She rapidly improved and has been feeling normally recently. Recent LFTs were normal as below. \par \par Weight hx: 138 lbs, BMI 26.1 after 10 lbs weight loss in 6 weeks by dieting. Max. weight was 148 lbs. Longstanding overweight 130-140 lbs.\par Alcohol hx: does not drink, never did, because of her pancreatitides.\par \par Workup: \par - 5/26/21 EGD: moderate gastric erythema, one erosion. Biopsied. Dilated lacteals duodenum, biopsied. PPI prescribed. Path: chronic active gastritis with intestinal metaplasia. Duodenum normal. \par - 1/29/21 DEXA scan (scanned): normal. \par - 1/19/21 fibroscan: 3.8 kPa, 217 dB/m - stage 0 fibrosis, stage 0 steatosis. Interpretation corrected, also values and interpretation in chart note from 1/19/21.\par - 10/12/20 MRI abdomen ww ([facility]): normal liver morphology. Vessels patent. Coronary varices, suggestive of portal HTN.\par - 8/4/16 liver biopsy ([facility]; scanned): moderate to marked inflammatory infiltrate involving portal areas with interface activity and extends into lobules. [...] Lymphocytes, plasma cells, eosinophils, some neutrophils., including rare neutrophils in small bile ducts. Lobular cholestasis is also present. No evidence of fibrosis. [...]not entirely characteristic, given pos. MELISSA, AMA, compatible with AIH.

## 2021-11-09 NOTE — ASSESSMENT
[FreeTextEntry1] : 45 yoF, autoimmune hepatitis, cystic fibrosis carrier, h/o acute pancreatitides (about 8-10 since age 10), hypothyroidism, osteoporosis, h/o gastritis (hospitalized 2/2020), CCY. AIH diagnosed in 2016 by her gastroenterologist at the time, Dr. Hood, at the time with bilirubin 7.2, AST//763, ALP, normal IgG 759, pos. anti-actin 69 U, MELISSA+,  and typical findings on liver biopsy in 2016 (scanned). \par \par - AIH without liver fibrosis, course as below\par - elevated AST/ALT when initially seen, likely due to steroid-induced SIEGEL. AST/ALT have normalized since steroid decreased.\par - adrenal insufficiency after years of steroid use, unable to taper prednisone to < 5 mg/d recently - develops nausea, excessive fatigue and hypotension, and PAZ, which have required hospitalization in the past\par - no osteopenia or osteoporosis seen on DEXA scan 1/2021\par - overweight, no steroid-induced diabetes\par - spotty follow-up\par \par - treatment 2016 for 6 months with prednisone (Dr. Hood), then budesonide 3 mg/d for 4 years until 8/2020            (Dr. Andra Morton, [facility])\par - nausea, vomiting,  RUQ pain, extreme exhaustion/fatigue same as during initial flare in 9/2020, three weeks after budesonide stopped, but only mild elevation of AST/ALT  87/78 and no IgG elevation.  Also creat 1.33. Referred to our office.\par - budesonide resumed 9/29/20, 9 mg/d, with rapid resolution of symptoms, LFTs normalized by 11/2020. Budesonide then tapered, on 3 mg/d since late 11/2020. Likely adrenal insufficiency. \par - 3/2021: significant fatigue, went to bed again after getting children out of the house in the morning. Prednisone increased to 15 mg/d with weekly taper to 12.5, 10, 7.5, then 5 mg/d. Did not follow-up.\par - 5/2021: hospitalization [facility] with mild acute pancreatitis: diffuse abdom. pain, lipase >600 U/L, pain resolved quickly after morphine x 2. CT showed possible mild pancreatic fullness. Also had hyponatremia Na 130 and hyperkalemia K 5.1 (<4.5). I increased prednisone to 10 mg/d after d/c for adrenal insufficiency - may have been relative insufficiency in setting of illness. Pancreatitis possibly due to CBD stone passage (CCY) given very short course, or poss. cystic fibrosis. \par - EGD 5/25/21 done for abdom. varices (MRI 10/2020, liver non-cirrhotic, mild heron dil per Dr. wilber Rossi): no varices, mod. gastritis, one erosion. H. pylori negative. N/V 2-3 days later without resolution until\par - 6/15: hospitalization [facility] x 3d for N/V/pain: lipase 70, CT no cause of pain. Hypothermia 36.1 C, transient leukocytosis WBC 30->20 within 6 hrs, mildly incr. granulocytes and immature grans. Again rapid resolution of pain morphine given x 2. Also hyponatremia, hyperkalemia PAZ creat 1.8, hyperbilirubinemia 2.2, nl other LFTs (only one timepoint). DD: infection, adrenal insufficiency, very mild pancreatitis. Pred 10 continued, pantoprazole incr to 40.\par - 6/24/2021: feeling well. \par \par - 1/19/21 fibroscan: no fibrosis, no steatosis - 3.8 kPa, 217 dB/m (report reviewed, interpretation and chart note corrected).\par - 3/2021 US abdomen: fatty liver, no gallstones, CCY.\par - likely intolerant of azathioprine - acute pancreatitis after initiation\par - longstanding h/o being overweight, BMI 26.1 after 10 lbs weight loss in 6 weeks by dieting\par \par \par - PAZ, again due to recent dehydration. eGFR 61 - suggestive of CKD2. Mother had one atrophic kidney. No HTN. No h/o significant NSAID intake. UA normal 1/19/21.\par - transient bilirubin elevation in January: DD includes Gilbert's disease. Elevation in May: suspect gallstone passage.\par \par - immune to hepatitis B due to vaccination, not immune to hepatitis A\par \par \par Plan:\par - hypoaldosteronism, hyperthyroidism, AIH: decrease prednisone to 4.5 mg/d for 4 weeks, then 4.0 mg/d after that. Return in 2 months\par - repeat LFTs and IgG before next visit\par - recurrent pancreatitis, FHx of cystic fibrosis in her son: sputum test for cystic fibrosis - will address  again at next visit\par

## 2022-01-04 ENCOUNTER — LABORATORY RESULT (OUTPATIENT)
Age: 47
End: 2022-01-04

## 2022-01-06 ENCOUNTER — NON-APPOINTMENT (OUTPATIENT)
Age: 47
End: 2022-01-06

## 2022-01-06 LAB
ALBUMIN SERPL ELPH-MCNC: 4.7 G/DL
ALP BLD-CCNC: 67 U/L
ALT SERPL-CCNC: 17 U/L
ANION GAP SERPL CALC-SCNC: 14 MMOL/L
AST SERPL-CCNC: 18 U/L
BASOPHILS # BLD AUTO: 0.08 K/UL
BASOPHILS NFR BLD AUTO: 0.5 %
BILIRUB SERPL-MCNC: 0.8 MG/DL
BUN SERPL-MCNC: 17 MG/DL
CALCIUM SERPL-MCNC: 9.6 MG/DL
CHLORIDE SERPL-SCNC: 100 MMOL/L
CO2 SERPL-SCNC: 20 MMOL/L
CREAT SERPL-MCNC: 1.61 MG/DL
EOSINOPHIL # BLD AUTO: 0.1 K/UL
EOSINOPHIL NFR BLD AUTO: 0.6 %
GLUCOSE SERPL-MCNC: 83 MG/DL
HCT VFR BLD CALC: 42.3 %
HGB BLD-MCNC: 13.5 G/DL
IGG SER QL IEP: 1019 MG/DL
IMM GRANULOCYTES NFR BLD AUTO: 0.6 %
LYMPHOCYTES # BLD AUTO: 5.06 K/UL
LYMPHOCYTES NFR BLD AUTO: 29.8 %
MAN DIFF?: NORMAL
MCHC RBC-ENTMCNC: 29.2 PG
MCHC RBC-ENTMCNC: 31.9 GM/DL
MCV RBC AUTO: 91.6 FL
MONOCYTES # BLD AUTO: 1.06 K/UL
MONOCYTES NFR BLD AUTO: 6.2 %
NEUTROPHILS # BLD AUTO: 10.56 K/UL
NEUTROPHILS NFR BLD AUTO: 62.3 %
PLATELET # BLD AUTO: 551 K/UL
POTASSIUM SERPL-SCNC: 5 MMOL/L
PROT SERPL-MCNC: 7.4 G/DL
RBC # BLD: 4.62 M/UL
RBC # FLD: 13.2 %
SODIUM SERPL-SCNC: 134 MMOL/L
WBC # FLD AUTO: 16.97 K/UL

## 2022-01-13 ENCOUNTER — APPOINTMENT (OUTPATIENT)
Dept: HEPATOLOGY | Facility: CLINIC | Age: 47
End: 2022-01-13
Payer: COMMERCIAL

## 2022-01-13 VITALS
OXYGEN SATURATION: 99 % | SYSTOLIC BLOOD PRESSURE: 115 MMHG | DIASTOLIC BLOOD PRESSURE: 78 MMHG | BODY MASS INDEX: 27.76 KG/M2 | RESPIRATION RATE: 14 BRPM | TEMPERATURE: 97.9 F | HEIGHT: 60 IN | WEIGHT: 141.4 LBS | HEART RATE: 89 BPM

## 2022-01-13 LAB
ANION GAP SERPL CALC-SCNC: 16 MMOL/L
BUN SERPL-MCNC: 25 MG/DL
CALCIUM SERPL-MCNC: 9.8 MG/DL
CHLORIDE SERPL-SCNC: 95 MMOL/L
CO2 SERPL-SCNC: 23 MMOL/L
CREAT SERPL-MCNC: 1.46 MG/DL
GLUCOSE SERPL-MCNC: 84 MG/DL
POTASSIUM SERPL-SCNC: 5.1 MMOL/L
SODIUM SERPL-SCNC: 134 MMOL/L

## 2022-01-13 PROCEDURE — 99214 OFFICE O/P EST MOD 30 MIN: CPT

## 2022-01-13 RX ORDER — PANTOPRAZOLE 20 MG/1
20 TABLET, DELAYED RELEASE ORAL
Qty: 30 | Refills: 2 | Status: DISCONTINUED | COMMUNITY
Start: 2021-06-02 | End: 2022-01-13

## 2022-01-13 RX ORDER — PREDNISONE 1 MG/1
1 TABLET ORAL EVERY MORNING
Qty: 150 | Refills: 2 | Status: DISCONTINUED | COMMUNITY
Start: 2021-06-24 | End: 2022-01-13

## 2022-01-13 NOTE — ASSESSMENT
[FreeTextEntry1] : 46 yoF, autoimmune hepatitis, cystic fibrosis carrier, h/o acute pancreatitides (about 8-10 since age 10), hypothyroidism, osteoporosis, h/o gastritis (hospitalized 2/2020), CCY. AIH diagnosed in 2016 by her gastroenterologist at the time, Dr. Hood, at the time with bilirubin 7.2, AST//763, ALP, normal IgG 759, pos. anti-actin 69 U, MELISSA+,  and typical findings on liver biopsy in 2016 (scanned). \par \par - adrenal insufficiency after years of steroid use, unable to taper prednisone to < 5 mg/d recently - develops nausea, excessive fatigue and hypotension, and PAZ, which have required hospitalization in the past. Sees endocrinology. \par \par - AIH; no fibrosis or steatosis seen on fibroscan 1/2021\par - fatty liver seen on US 3/2021, but not on MRI. Is overweight BMI 27\par - normalized AST/ALT, which were elevated when initially seen, likely due to steroid-induced SIEGEL. AST/ALT have normalized since steroid decreased.\par - abdominal varices on MRI 10/2020, but no esophageal varices on EGD 5/25/21, did have one erosion. H. pylori negative. \par \par - no osteopenia or osteoporosis seen on DEXA scan 1/2021\par - overweight, no steroid-induced diabetes\par \par - 5/2021: hospitalization [facility] with mild acute pancreatitis: diffuse abdom. pain, lipase >600 U/L, pain resolved quickly after morphine x 2. CT showed possible mild pancreatic fullness. Also had hyponatremia Na 130 and hyperkalemia K 5.1 (<4.5). I increased prednisone to 10 mg/d after d/c for adrenal insufficiency - may have been relative insufficiency in setting of illness. Pancreatitis possibly due to CBD stone passage (CCY) given very short course, or poss. cystic fibrosis. \par \par - 6/15/21: hospitalization [facility] x 3d for N/V/pain: lipase 70, CT no cause of pain. Hypothermia 36.1 C, transient leukocytosis WBC 30->20 within 6 hrs, mildly incr. granulocytes and immature grans. Again rapid resolution of pain morphine given x 2. Also hyponatremia, hyperkalemia PAZ creat 1.8, hyperbilirubinemia 2.2, nl other LFTs (only one timepoint). DD: infection, adrenal insufficiency, very mild pancreatitis. Pred 10 continued, pantoprazole incr to 40.\par \par \par - likely intolerant of azathioprine - acute pancreatitis after initiation\par - longstanding h/o being overweight, BMI 26.1 \par - PAZ, again due to recent dehydration. eGFR 61 - suggestive of CKD2. No HTN. No h/o significant NSAID intake. UA normal 1/19/21.\par - transient bilirubin elevation in January: DD includes Gilbert's disease. Elevation in May 2021: suspect gallstone passage.\par \par - immune to hepatitis B due to vaccination, not immune to hepatitis A\par \par - spotty follow-up \par \par Plan:\par - hypoaldosteronism, with PAZ on prednisone 5 mg/d: switch to hydrocortisone 10-5-0 mg/d\par - repeat CMP in 4 days and before next visit in 3 weeks. Pt. will call in 5 days to discuss.\par - recurrent pancreatitis, FHx of cystic fibrosis in her son: sputum test for cystic fibrosis - will address  again at next visit\par

## 2022-01-13 NOTE — HISTORY OF PRESENT ILLNESS
[Autoimmune Disorder] : autoimmune disorder [Needlestick Exposure] : no needlestick exposure [Infected Sexual Partner] : no infected sexual partner [IV Drug Use] : no IV drug use [Tattoo] : no tattoos [Body Piercing] : no body piercing [Hemodialysis] : no hemodialysis [Transfusion before 1992] : no transfusion before 1992 [Transplant before 1992] : no transplant before 1992 [Alcohol Abuse] : no alcohol abuse [Household Contact to HBV] : no household contact to HBV [Travel to Endemic Area] : no travel to an endemic area [Occupational Exposure] : no occupational exposure [Cocaine Use] : no cocaine use [de-identified] : - 1/13/22: has been taking prednisone 5 mg/d, was unable to tolerate lower doses - feels terrible when she tries. Does not need to sleep in AM anymore, but at 8pm, sleeps 8-10 hrs, in the past only 6-8 hrs. Had recent bloodwork that showed PAZ, improved after repeat and drinking more water. Does not eat low-salt diet. Meds: no change.\par \par - 11/9/21: returns after 3 months instead of one. Taking prednisone 5 mg/d, after tapering from 9 mg/d in 1 mg/d decrements for 2 weeks each. At 4 mg/d, she had nausea three times after 3-4 days, and has been taking 5 m/d for the last 3 weeks. She saw another hematologist and was given VitB12 shots for pernicious anemia for the last 3 weeks- has not felt a difference. She does not have to sleep during the day anymore, but does feel tired. Sleeps 9 hrs/day.\par \par - 6/24/21: returns after weekly taper of prednisone from 15 mg/d to 5 mg/d over 5 weeks after March visit, repeat bloodwork in April, hospitalization in May for 3 days at [facility] with generalized abdominal pain, nausea and vomiting, diagnosed with mild acute pancreatitis. CT report reviewed: potential mild prominence of prox and mid pancreas. Lipase 5/18: 689 elevated, LFTs normal, hyponatremia Na 130 and hyperkalemia K 5.1. Treated with morphine x2. I then increased prednisone to 10 mg/d for adrenal insufficiency, but nausea didn't improve much; then EGD 5/25 - I prescribed pantoprazole 20 mg/d for gastritis with erosion. She had recurrent pain 2-3 days after the EGD and nausea & vomiting again but stayed at home for two weeks feeling like crap until hospitalization 6/14-17, told that it was gastritis and pantoprazole was increased to 40 mg/d. Hospital records (Pt's cell phone): labs 6/15: hypothermia 36.1 C, WBC 29.9 at 1am with 62% neutros and 1.1% immature grans (elevated), then 20 at 7am, Na 130, K 5.1 - hyponatremia and hyperkalemia, bili 2.2 elevated. No f/u labs available. CT 6/15 report: CCY, no cause of pain. She had hyperkalemia 5.1 (<=4.5) and PAZ creat 1.8. She was given morphine again x 2. Her level of energy has been ok throughout all of this. \par Prednisone was not changed - taking 10 mg/d. Sleeps better - from 21:00-6:00. No nausea recently. \par \par - 3/18/21: returns after repeat labs, done on 3/3, and seeing Dr. Mayer. Taking budesonide. Feels more fatigue than when steroid started at higher dose, goes to bed at 8pm, sleeps until she gets her kids out of the house, then sleeps another hour or so. \par \par - 2/4/21: returns after bloodwork last visit, UA, and fibroscan. Taking budesonide 3 mg/d, feels normal. EGD not done.\par \par - 1/14/21: returns after 2 months. Recent bloodwork not done. She postponed the EGD close to her home until after her 2nd COVID shot.\par \par - Ms. HORNE is a 45 year old woman with h/o acute pancreatitides (about 8-10 since age 10), hypothyroidism, osteoporosis, h/o gastritis (hospitalized 2/2020), CCY, who  was diagnosed with AIH by her gastroenterologist at the time, Dr. Hood, after she developed dark urine, jaundice, fatigue and some vomiting, and was found to have elevated LFTs with bilirubin 7.2, AST//763, ALP, pos. anti-actin 69 U, MELISSA+,  based on a liver biopsy. She saw Dr. Naranjo at our office for a second opinion after she had developed side-effects to treatment with prednisone, initially 60 mg/d. LFTs had improved. Dr. Naranjo ordered bloodwork, planned to review the liver biopsy, and agreed with tapering prednisone, and recommended the addition of azathioprine. \par The patient did not follow-up at that time. Labs form 9/2016 show AST/ALT mildly elevated, and negative MELISSA, ASMA and normal IgG of 759. She was treated with prednisone for 6 months, then followed with Dr. Andra Morton at [facility] and wishes to transfer care here as it is closer to her home.\par She developed acute pancreatitis to azathioprine and was treated with budesonide 3 mg/d until 8/2020. She developed persistent nausea and vomiting about a week later that lasted for more than three weeks, and RUQ pain, and extreme exhaustion and fatigue. Her gastroenterologist found elevated AST/ALT 87/78, K 4.3, Na 136, Creat 1.33, BUN 20, and resumed budesonide 9 mg/d on 9/29/20. She rapidly improved and has been feeling normally recently. Recent LFTs were normal as below. \par \par Weight hx: 138 lbs, BMI 26.1 after 10 lbs weight loss in 6 weeks by dieting. Max. weight was 148 lbs. Longstanding overweight 130-140 lbs.\par Alcohol hx: does not drink, never did, because of her pancreatitides.\par \par Workup: \par - 5/26/21 EGD: moderate gastric erythema, one erosion. Biopsied. Dilated lacteals duodenum, biopsied. PPI prescribed. Path: chronic active gastritis with intestinal metaplasia. Duodenum normal. \par - 1/29/21 DEXA scan (scanned): normal. \par - 1/19/21 fibroscan: 3.8 kPa, 217 dB/m - stage 0 fibrosis, stage 0 steatosis. Interpretation corrected, also values and interpretation in chart note from 1/19/21.\par - 10/12/20 MRI abdomen wwo ([facility]): normal liver morphology. Vessels patent. Coronary varices, suggestive of portal HTN.\par - 8/4/16 liver biopsy ([facility]; scanned): moderate to marked inflammatory infiltrate involving portal areas with interface activity and extends into lobules. [...] Lymphocytes, plasma cells, eosinophils, some neutrophils., including rare neutrophils in small bile ducts. Lobular cholestasis is also present. No evidence of fibrosis. [...]not entirely characteristic, given pos. MELISSA, AMA, compatible with AIH.

## 2022-01-13 NOTE — CONSULT LETTER
[Dear  ___] : Dear  [unfilled], [Consult Letter:] : I had the pleasure of evaluating your patient, [unfilled]. [Please see my note below.] : Please see my note below. [Consult Closing:] : Thank you very much for allowing me to participate in the care of this patient.  If you have any questions, please do not hesitate to contact me. [FreeTextEntry2] : NICKY MALHOTRA (PCP)\par  [FreeTextEntry3] : Waldemar Gaming MD\par , [facility]\par General Hepatology and Gastroenterology\par [facility]\par [facility]\par [address] [place] [address]\[place] [address]\par [phone]\par

## 2022-01-17 ENCOUNTER — RX RENEWAL (OUTPATIENT)
Age: 47
End: 2022-01-17

## 2022-01-18 ENCOUNTER — NON-APPOINTMENT (OUTPATIENT)
Age: 47
End: 2022-01-18

## 2022-01-28 ENCOUNTER — LABORATORY RESULT (OUTPATIENT)
Age: 47
End: 2022-01-28

## 2022-02-01 ENCOUNTER — APPOINTMENT (OUTPATIENT)
Dept: HEPATOLOGY | Facility: CLINIC | Age: 47
End: 2022-02-01
Payer: COMMERCIAL

## 2022-02-01 VITALS
OXYGEN SATURATION: 98 % | HEIGHT: 60 IN | TEMPERATURE: 98 F | HEART RATE: 77 BPM | DIASTOLIC BLOOD PRESSURE: 72 MMHG | BODY MASS INDEX: 27.48 KG/M2 | WEIGHT: 140 LBS | SYSTOLIC BLOOD PRESSURE: 117 MMHG

## 2022-02-01 DIAGNOSIS — Z86.16 PERSONAL HISTORY OF COVID-19: ICD-10-CM

## 2022-02-01 PROCEDURE — 99214 OFFICE O/P EST MOD 30 MIN: CPT

## 2022-02-01 NOTE — ASSESSMENT
[FreeTextEntry1] : 46 yoF, autoimmune hepatitis, cystic fibrosis carrier, h/o acute pancreatitides (about 8-10 since age 10), hypothyroidism, osteoporosis, h/o gastritis (hospitalized 2/2020), CCY. AIH diagnosed in 2016 by her gastroenterologist at the time, Dr. Hood, at the time with bilirubin 7.2, AST//763, ALP, normal IgG 759, pos. anti-actin 69 U, MELISSA+,  and typical findings on liver biopsy in 2016 (scanned). \par \par - adrenal insufficiency after years of steroid use, unable to taper prednisone to < 5 mg/d in 2021 - develops nausea, excessive fatigue and hypotension, and PAZ, which have required hospitalization in the past. Sees endocrinology. Methylprednisolone 10-0-5 mg started 1/2022. Then had COVID. Now mild cushingoid face and sleep disturbance 1/2022 - mild ALT elevation, may be due to SIEGEL. IgG nl. \par \par - AIH; no fibrosis or steatosis seen on fibroscan 1/2021\par - fatty liver seen on US 3/2021, but not on MRI. Is overweight BMI 27\par - normalized AST/ALT, which were elevated when initially seen, likely due to steroid-induced SIEGEL. AST/ALT have normalized since steroid decreased.\par - abdominal varices on MRI 10/2020, but no esophageal varices on EGD 5/25/21, did have one erosion. H. pylori negative. \par \par - no osteopenia or osteoporosis seen on DEXA scan 1/2021\par - overweight, no steroid-induced diabetes\par \par - 5/2021: hospitalization [facility] with mild acute pancreatitis: diffuse abdom. pain, lipase >600 U/L, pain resolved quickly after morphine x 2. CT showed possible mild pancreatic fullness. Also had hyponatremia Na 130 and hyperkalemia K 5.1 (<4.5). I increased prednisone to 10 mg/d after d/c for adrenal insufficiency - may have been relative insufficiency in setting of illness. Pancreatitis possibly due to CBD stone passage (CCY) given very short course, or poss. cystic fibrosis. \par \par - 6/15/21: hospitalization [facility] x 3d for N/V/pain: lipase 70, CT no cause of pain. Hypothermia 36.1 C, transient leukocytosis WBC 30->20 within 6 hrs, mildly incr. granulocytes and immature grans. Again rapid resolution of pain morphine given x 2. Also hyponatremia, hyperkalemia PAZ creat 1.8, hyperbilirubinemia 2.2, nl other LFTs (only one timepoint). DD: infection, adrenal insufficiency, very mild pancreatitis. Pred 10 continued, pantoprazole incr to 40.\par \par \par - likely intolerant of azathioprine - acute pancreatitis after initiation\par - longstanding h/o being overweight, BMI 26.1 \par - PAZ, again due to recent dehydration. eGFR 61 - suggestive of CKD2. No HTN. No h/o significant NSAID intake. UA normal 1/19/21.\par - transient bilirubin elevation in January: DD includes Gilbert's disease. Elevation in May 2021: suspect gallstone passage.\par - VitB12 deficiency found 7/2021 - ~80 ng/mL, gets monthly VitB12 injections. Not anemic and MCV normal though. Has thrombocytosis.\par \par - immune to hepatitis B due to vaccination, not immune to hepatitis A\par \par - spotty follow-up \par \par Plan:\par - hypoaldosteronism: dercease hydrocortisone to 7.5-0-2.5 mg/d x 3 weeks, then 7.5-0-0 mg/d, return in 6 weeks after repeat bloodwork\par \par - recurrent pancreatitis, FHx of cystic fibrosis in her son: sputum test for cystic fibrosis - will address  again at next visit\par

## 2022-02-01 NOTE — CONSULT LETTER
[Dear  ___] : Dear  [unfilled], [Consult Letter:] : I had the pleasure of evaluating your patient, [unfilled]. [Please see my note below.] : Please see my note below. [Consult Closing:] : Thank you very much for allowing me to participate in the care of this patient.  If you have any questions, please do not hesitate to contact me. [FreeTextEntry2] : NICKY MALHOTRA (PCP)\par  [FreeTextEntry3] : Waldemar Gaming MD\par , [facility]\par General Hepatology and Gastroenterology\par [facility]\par [facility]\par [address] [place] [address]\[place] [address]\par [phone]\par

## 2022-02-01 NOTE — HISTORY OF PRESENT ILLNESS
[Autoimmune Disorder] : autoimmune disorder [Needlestick Exposure] : no needlestick exposure [Infected Sexual Partner] : no infected sexual partner [IV Drug Use] : no IV drug use [Tattoo] : no tattoos [Body Piercing] : no body piercing [Hemodialysis] : no hemodialysis [Transfusion before 1992] : no transfusion before 1992 [Transplant before 1992] : no transplant before 1992 [Alcohol Abuse] : no alcohol abuse [Household Contact to HBV] : no household contact to HBV [Travel to Endemic Area] : no travel to an endemic area [Occupational Exposure] : no occupational exposure [Cocaine Use] : no cocaine use [de-identified] : - 2/1/22: returns after switching to methylprednisolone 10-0-5 mg/d. Was unable to taper as she developed COVID. Had antiviral infusion in the ED, BP at the time 70s after she had stopped the steroid. I had recommended to resume. Now feels at baseline again. Has difficulty sleeping at night, still tired during the day. Tells me that she was diagnosed with pernicious anemia in July 2021 and has been getting monthly VitB12 injections.\par Exam: body weight stable, but Cushingoid facies today - much cotton cheeks\par \par - 1/13/22: has been taking prednisone 5 mg/d, was unable to tolerate lower doses - feels terrible when she tries. Does not need to sleep in AM anymore, but at 8pm, sleeps 8-10 hrs, in the past only 6-8 hrs. Had recent bloodwork that showed PAZ, improved after repeat and drinking more water. Does not eat low-salt diet. Meds: no change.\par \par - 11/9/21: returns after 3 months instead of one. Taking prednisone 5 mg/d, after tapering from 9 mg/d in 1 mg/d decrements for 2 weeks each. At 4 mg/d, she had nausea three times after 3-4 days, and has been taking 5 m/d for the last 3 weeks. She saw another hematologist and was given VitB12 shots for pernicious anemia for the last 3 weeks- has not felt a difference. She does not have to sleep during the day anymore, but does feel tired. Sleeps 9 hrs/day.\par \par - 6/24/21: returns after weekly taper of prednisone from 15 mg/d to 5 mg/d over 5 weeks after March visit, repeat bloodwork in April, hospitalization in May for 3 days at [facility] with generalized abdominal pain, nausea and vomiting, diagnosed with mild acute pancreatitis. CT report reviewed: potential mild prominence of prox and mid pancreas. Lipase 5/18: 689 elevated, LFTs normal, hyponatremia Na 130 and hyperkalemia K 5.1. Treated with morphine x2. I then increased prednisone to 10 mg/d for adrenal insufficiency, but nausea didn't improve much; then EGD 5/25 - I prescribed pantoprazole 20 mg/d for gastritis with erosion. She had recurrent pain 2-3 days after the EGD and nausea & vomiting again but stayed at home for two weeks feeling like crap until hospitalization 6/14-17, told that it was gastritis and pantoprazole was increased to 40 mg/d. Hospital records (Pt's cell phone): labs 6/15: hypothermia 36.1 C, WBC 29.9 at 1am with 62% neutros and 1.1% immature grans (elevated), then 20 at 7am, Na 130, K 5.1 - hyponatremia and hyperkalemia, bili 2.2 elevated. No f/u labs available. CT 6/15 report: CCY, no cause of pain. She had hyperkalemia 5.1 (<=4.5) and PAZ creat 1.8. She was given morphine again x 2. Her level of energy has been ok throughout all of this. \par Prednisone was not changed - taking 10 mg/d. Sleeps better - from 21:00-6:00. No nausea recently. \par \par - 3/18/21: returns after repeat labs, done on 3/3, and seeing Dr. Mayer. Taking budesonide. Feels more fatigue than when steroid started at higher dose, goes to bed at 8pm, sleeps until she gets her kids out of the house, then sleeps another hour or so. \par \par - 2/4/21: returns after bloodwork last visit, UA, and fibroscan. Taking budesonide 3 mg/d, feels normal. EGD not done.\par \par - 1/14/21: returns after 2 months. Recent bloodwork not done. She postponed the EGD close to her home until after her 2nd COVID shot.\par \par - Ms. HORNE is a 45 year old woman with h/o acute pancreatitides (about 8-10 since age 10), hypothyroidism, osteoporosis, h/o gastritis (hospitalized 2/2020), CCY, who  was diagnosed with AIH by her gastroenterologist at the time, Dr. Hood, after she developed dark urine, jaundice, fatigue and some vomiting, and was found to have elevated LFTs with bilirubin 7.2, AST//763, ALP, pos. anti-actin 69 U, MELISSA+,  based on a liver biopsy. She saw Dr. Naranjo at our office for a second opinion after she had developed side-effects to treatment with prednisone, initially 60 mg/d. LFTs had improved. Dr. Naranjo ordered bloodwork, planned to review the liver biopsy, and agreed with tapering prednisone, and recommended the addition of azathioprine. \par The patient did not follow-up at that time. Labs form 9/2016 show AST/ALT mildly elevated, and negative MELISSA, ASMA and normal IgG of 759. She was treated with prednisone for 6 months, then followed with Dr. Andra Morton at [facility] and wishes to transfer care here as it is closer to her home.\par She developed acute pancreatitis to azathioprine and was treated with budesonide 3 mg/d until 8/2020. She developed persistent nausea and vomiting about a week later that lasted for more than three weeks, and RUQ pain, and extreme exhaustion and fatigue. Her gastroenterologist found elevated AST/ALT 87/78, K 4.3, Na 136, Creat 1.33, BUN 20, and resumed budesonide 9 mg/d on 9/29/20. She rapidly improved and has been feeling normally recently. Recent LFTs were normal as below. \par \par Weight hx: 138 lbs, BMI 26.1 after 10 lbs weight loss in 6 weeks by dieting. Max. weight was 148 lbs. Longstanding overweight 130-140 lbs.\par Alcohol hx: does not drink, never did, because of her pancreatitides.\par \par Workup: \par - 5/26/21 EGD: moderate gastric erythema, one erosion. Biopsied. Dilated lacteals duodenum, biopsied. PPI prescribed. Path: chronic active gastritis with intestinal metaplasia. Duodenum normal. \par - 1/29/21 DEXA scan (scanned): normal. \par - 1/19/21 fibroscan: 3.8 kPa, 217 dB/m - stage 0 fibrosis, stage 0 steatosis. Interpretation corrected, also values and interpretation in chart note from 1/19/21.\par - 10/12/20 MRI abdomen wwo ([facility]): normal liver morphology. Vessels patent. Coronary varices, suggestive of portal HTN.\par - 8/4/16 liver biopsy ([facility]; scanned): moderate to marked inflammatory infiltrate involving portal areas with interface activity and extends into lobules. [...] Lymphocytes, plasma cells, eosinophils, some neutrophils., including rare neutrophils in small bile ducts. Lobular cholestasis is also present. No evidence of fibrosis. [...]not entirely characteristic, given pos. MELISSA, AMA, compatible with AIH.

## 2022-04-04 ENCOUNTER — APPOINTMENT (OUTPATIENT)
Dept: NEPHROLOGY | Facility: CLINIC | Age: 47
End: 2022-04-04

## 2022-06-13 ENCOUNTER — APPOINTMENT (OUTPATIENT)
Dept: ENDOCRINOLOGY | Facility: CLINIC | Age: 47
End: 2022-06-13

## 2022-06-14 ENCOUNTER — APPOINTMENT (OUTPATIENT)
Dept: HEPATOLOGY | Facility: CLINIC | Age: 47
End: 2022-06-14
Payer: COMMERCIAL

## 2022-06-14 VITALS
BODY MASS INDEX: 26.43 KG/M2 | DIASTOLIC BLOOD PRESSURE: 74 MMHG | TEMPERATURE: 97.9 F | WEIGHT: 140 LBS | SYSTOLIC BLOOD PRESSURE: 106 MMHG | HEART RATE: 77 BPM | OXYGEN SATURATION: 99 % | HEIGHT: 61 IN

## 2022-06-14 DIAGNOSIS — N18.2 CHRONIC KIDNEY DISEASE, STAGE 2 (MILD): ICD-10-CM

## 2022-06-14 DIAGNOSIS — R79.89 OTHER SPECIFIED ABNORMAL FINDINGS OF BLOOD CHEMISTRY: ICD-10-CM

## 2022-06-14 PROCEDURE — 99214 OFFICE O/P EST MOD 30 MIN: CPT

## 2022-06-14 NOTE — HISTORY OF PRESENT ILLNESS
[Autoimmune Disorder] : autoimmune disorder [Needlestick Exposure] : no needlestick exposure [Infected Sexual Partner] : no infected sexual partner [IV Drug Use] : no IV drug use [Tattoo] : no tattoos [Body Piercing] : no body piercing [Hemodialysis] : no hemodialysis [Transfusion before 1992] : no transfusion before 1992 [Transplant before 1992] : no transplant before 1992 [Alcohol Abuse] : no alcohol abuse [Travel to Endemic Area] : no travel to an endemic area [Household Contact to HBV] : no household contact to HBV [Occupational Exposure] : no occupational exposure [Cocaine Use] : no cocaine use [de-identified] : - 6/14/22: returns after 4 months instead of 6 weeks, and hydrocortisone taper every 2 weeks to 7.5 mg/d. She reduced to 5 mg/d 6 weeks after the last visit. Feels better than in past, does not need to sleep during the day anymore. \par Exam: weight 140 lbs, BMI 26.5, still has Cushingoid facies with full cheeks. No buffalo hump, no abdo. striae distensae. \par \par - 2/1/22: returns after switching to methylprednisolone 10-0-5 mg/d. Was unable to taper as she developed COVID. Had antiviral infusion in the ED, BP at the time 70s after she had stopped the steroid. I had recommended to resume. Now feels at baseline again. Has difficulty sleeping at night, still tired during the day. Tells me that she was diagnosed with pernicious anemia in July 2021 and has been getting monthly VitB12 injections.\par Exam: body weight stable, but Cushingoid facies today - much cotton cheeks\par \par - 1/13/22: has been taking prednisone 5 mg/d, was unable to tolerate lower doses - feels terrible when she tries. Does not need to sleep in AM anymore, but at 8pm, sleeps 8-10 hrs, in the past only 6-8 hrs. Had recent bloodwork that showed PAZ, improved after repeat and drinking more water. Does not eat low-salt diet. Meds: no change.\par \par - 11/9/21: returns after 3 months instead of one. Taking prednisone 5 mg/d, after tapering from 9 mg/d in 1 mg/d decrements for 2 weeks each. At 4 mg/d, she had nausea three times after 3-4 days, and has been taking 5 m/d for the last 3 weeks. She saw another hematologist and was given VitB12 shots for pernicious anemia for the last 3 weeks- has not felt a difference. She does not have to sleep during the day anymore, but does feel tired. Sleeps 9 hrs/day.\par \par - 6/24/21: returns after weekly taper of prednisone from 15 mg/d to 5 mg/d over 5 weeks after March visit, repeat bloodwork in April, hospitalization in May for 3 days at [facility] with generalized abdominal pain, nausea and vomiting, diagnosed with mild acute pancreatitis. CT report reviewed: potential mild prominence of prox and mid pancreas. Lipase 5/18: 689 elevated, LFTs normal, hyponatremia Na 130 and hyperkalemia K 5.1. Treated with morphine x2. I then increased prednisone to 10 mg/d for adrenal insufficiency, but nausea didn't improve much; then EGD 5/25 - I prescribed pantoprazole 20 mg/d for gastritis with erosion. She had recurrent pain 2-3 days after the EGD and nausea & vomiting again but stayed at home for two weeks feeling like crap until hospitalization 6/14-17, told that it was gastritis and pantoprazole was increased to 40 mg/d. Hospital records (Pt's cell phone): labs 6/15: hypothermia 36.1 C, WBC 29.9 at 1am with 62% neutros and 1.1% immature grans (elevated), then 20 at 7am, Na 130, K 5.1 - hyponatremia and hyperkalemia, bili 2.2 elevated. No f/u labs available. CT 6/15 report: CCY, no cause of pain. She had hyperkalemia 5.1 (<=4.5) and PAZ creat 1.8. She was given morphine again x 2. Her level of energy has been ok throughout all of this. \par Prednisone was not changed - taking 10 mg/d. Sleeps better - from 21:00-6:00. No nausea recently. \par \par - 3/18/21: returns after repeat labs, done on 3/3, and seeing Dr. Mayer. Taking budesonide. Feels more fatigue than when steroid started at higher dose, goes to bed at 8pm, sleeps until she gets her kids out of the house, then sleeps another hour or so. \par \par - 2/4/21: returns after bloodwork last visit, UA, and fibroscan. Taking budesonide 3 mg/d, feels normal. EGD not done.\par \par - 1/14/21: returns after 2 months. Recent bloodwork not done. She postponed the EGD close to her home until after her 2nd COVID shot.\par \par - Ms. HORNE is a 45 year old woman with h/o acute pancreatitides (about 8-10 since age 10), hypothyroidism, osteoporosis, h/o gastritis (hospitalized 2/2020), CCY, who  was diagnosed with AIH by her gastroenterologist at the time, Dr. Hood, after she developed dark urine, jaundice, fatigue and some vomiting, and was found to have elevated LFTs with bilirubin 7.2, AST//763, ALP, pos. anti-actin 69 U, MELISSA+,  based on a liver biopsy. She saw Dr. Naranjo at our office for a second opinion after she had developed side-effects to treatment with prednisone, initially 60 mg/d. LFTs had improved. Dr. Naranjo ordered bloodwork, planned to review the liver biopsy, and agreed with tapering prednisone, and recommended the addition of azathioprine. \par The patient did not follow-up at that time. Labs form 9/2016 show AST/ALT mildly elevated, and negative MELISSA, ASMA and normal IgG of 759. She was treated with prednisone for 6 months, then followed with Dr. Andra Morton at [facility] and wishes to transfer care here as it is closer to her home.\par She developed acute pancreatitis to azathioprine and was treated with budesonide 3 mg/d until 8/2020. She developed persistent nausea and vomiting about a week later that lasted for more than three weeks, and RUQ pain, and extreme exhaustion and fatigue. Her gastroenterologist found elevated AST/ALT 87/78, K 4.3, Na 136, Creat 1.33, BUN 20, and resumed budesonide 9 mg/d on 9/29/20. She rapidly improved and has been feeling normally recently. Recent LFTs were normal as below. \par \par Weight hx: 138 lbs, BMI 26.1 after 10 lbs weight loss in 6 weeks by dieting. Max. weight was 148 lbs. Longstanding overweight 130-140 lbs.\par Alcohol hx: does not drink, never did, because of her pancreatitides.\par \par Workup: \par - 5/26/21 EGD: moderate gastric erythema, one erosion. Biopsied. Dilated lacteals duodenum, biopsied. PPI prescribed. Path: chronic active gastritis with intestinal metaplasia. Duodenum normal. \par - 1/29/21 DEXA scan (scanned): normal. \par - 1/19/21 fibroscan: 3.8 kPa, 217 dB/m - stage 0 fibrosis, stage 0 steatosis. Interpretation corrected, also values and interpretation in chart note from 1/19/21.\par - 10/12/20 MRI abdomen wwo ([facility]): normal liver morphology. Vessels patent. Coronary varices, suggestive of portal HTN.\par - 8/4/16 liver biopsy ([facility]; scanned): moderate to marked inflammatory infiltrate involving portal areas with interface activity and extends into lobules. [...] Lymphocytes, plasma cells, eosinophils, some neutrophils., including rare neutrophils in small bile ducts. Lobular cholestasis is also present. No evidence of fibrosis. [...]not entirely characteristic, given pos. MELISSA, AMA, compatible with AIH.

## 2022-06-14 NOTE — CONSULT LETTER
[Dear  ___] : Dear  [unfilled], [Consult Letter:] : I had the pleasure of evaluating your patient, [unfilled]. [Please see my note below.] : Please see my note below. [Consult Closing:] : Thank you very much for allowing me to participate in the care of this patient.  If you have any questions, please do not hesitate to contact me. [FreeTextEntry2] : NICKY MALHOTRA (PCP)\par  [FreeTextEntry3] : Waldemar Gaming MD\par , [facility]\par General Hepatology and Gastroenterology\par [facility]\par [facility]\par [address] [place] [address]\[place] [address]\par [phone]\par

## 2022-06-14 NOTE — ASSESSMENT
[FreeTextEntry1] : 46 yoF with autoimmune hepatitis, cystic fibrosis carrier, h/o acute pancreatitides (about 8-10 since age 10), hypothyroidism, osteoporosis, h/o gastritis (hospitalized 2/2020), CCY. AIH diagnosed in 2016 by her gastroenterologist at the time, Dr. Hood, at the time with bilirubin 7.2, AST//763, ALP, normal IgG 759, pos. anti-actin 69 U, MELISSA+,  and typical findings on liver biopsy in 2016 (scanned). \par \par - adrenal insufficiency after years of steroid use, unable to taper prednisone to < 5 mg/d in 2021 - developed nausea, excessive fatigue and hypotension, and PAZ, which have required hospitalization in the past. Sees endocrinology. Methylprednisolone 10-0-5 mg started 1/2022, tolerated taper to 5 mg qAM over 6 weeks. Has mild cushingoid face - LFTs and IgG normal in January.\par \par - AIH; no fibrosis or steatosis seen on fibroscan 1/2021\par - fatty liver seen on US 3/2021, but not on MRI. Is overweight BMI 27\par - normalized AST/ALT, which were elevated when initially seen, likely due to steroid-induced SIEGEL. AST/ALT have normalized since steroid decreased.\par - abdominal varices on MRI 10/2020, but no esophageal varices on EGD 5/25/21, did have one erosion. H. pylori negative. \par \par - no osteopenia or osteoporosis seen on DEXA scan 1/2021\par - overweight, no steroid-induced diabetes\par - CKD 3, creat 1.30. Saw Dr. Mayer in past, and another nephrologist in 2022, was told that she had small kidneys.\par \par - 5/2021: hospitalization [facility] with mild acute pancreatitis: diffuse abdom. pain, lipase >600 U/L, pain resolved quickly after morphine x 2. CT showed possible mild pancreatic fullness. Also had hyponatremia Na 130 and hyperkalemia K 5.1 (<4.5). I increased prednisone to 10 mg/d after d/c for adrenal insufficiency - may have been relative insufficiency in setting of illness. Pancreatitis possibly due to CBD stone passage (CCY) given very short course, or poss. cystic fibrosis. \par \par - 6/15/21: hospitalization [facility] x 3d for N/V/pain: lipase 70, CT no cause of pain. Hypothermia 36.1 C, transient leukocytosis WBC 30->20 within 6 hrs, mildly incr. granulocytes and immature grans. Again rapid resolution of pain morphine given x 2. Also hyponatremia, hyperkalemia PAZ creat 1.8, hyperbilirubinemia 2.2, nl other LFTs (only one timepoint). DD: infection, adrenal insufficiency, very mild pancreatitis. Pred 10 continued, pantoprazole incr to 40.\par \par \par - likely intolerant of azathioprine - acute pancreatitis after initiation\par - longstanding h/o being overweight, BMI 26.1 \par - PAZ, again due to recent dehydration. eGFR 61 - suggestive of CKD2. No HTN. No h/o significant NSAID intake. UA normal 1/19/21.\par - transient bilirubin elevation in January: DD includes Gilbert's disease. Elevation in May 2021: suspect gallstone passage.\par - VitB12 deficiency found 7/2021 - ~80 ng/mL, gets monthly VitB12 injections. Not anemic and MCV normal though. Has thrombocytosis.\par \par - immune to hepatitis B due to vaccination, not immune to hepatitis A\par \par - spotty follow-up \par \par Plan:\par - hypoaldosteronism: decrease hydrocortisone after her trip to [place] until mid July to 5 mg three times a week, x 2 weeks, then twice a week x 2 weeks, then stop. Return in 3 months after repeat bloodwork. \par \par - recurrent pancreatitis, FHx of cystic fibrosis in her son: sputum test for cystic fibrosis not covered by insurance \par

## 2022-06-27 ENCOUNTER — NON-APPOINTMENT (OUTPATIENT)
Age: 47
End: 2022-06-27

## 2022-07-20 ENCOUNTER — INPATIENT (INPATIENT)
Facility: HOSPITAL | Age: 47
LOS: 1 days | Discharge: HOME | DRG: 683 | End: 2022-07-22
Admitting: HOSPITALIST
Payer: COMMERCIAL

## 2022-07-20 VITALS
SYSTOLIC BLOOD PRESSURE: 132 MMHG | TEMPERATURE: 98 F | HEART RATE: 83 BPM | HEIGHT: 60 IN | WEIGHT: 134.04 LBS | OXYGEN SATURATION: 98 % | RESPIRATION RATE: 16 BRPM | DIASTOLIC BLOOD PRESSURE: 84 MMHG

## 2022-07-20 DIAGNOSIS — Z98.890 OTHER SPECIFIED POSTPROCEDURAL STATES: Chronic | ICD-10-CM

## 2022-07-20 LAB
ALBUMIN SERPL ELPH-MCNC: 4.6 G/DL — SIGNIFICANT CHANGE UP (ref 3.3–5.2)
ALP SERPL-CCNC: 73 U/L — SIGNIFICANT CHANGE UP (ref 40–120)
ALT FLD-CCNC: 21 U/L — SIGNIFICANT CHANGE UP
ANION GAP SERPL CALC-SCNC: 15 MMOL/L — SIGNIFICANT CHANGE UP (ref 5–17)
AST SERPL-CCNC: 22 U/L — SIGNIFICANT CHANGE UP
BASOPHILS # BLD AUTO: 0 K/UL — SIGNIFICANT CHANGE UP (ref 0–0.2)
BASOPHILS NFR BLD AUTO: 0 % — SIGNIFICANT CHANGE UP (ref 0–2)
BILIRUB SERPL-MCNC: 0.8 MG/DL — SIGNIFICANT CHANGE UP (ref 0.4–2)
BUN SERPL-MCNC: 39.1 MG/DL — HIGH (ref 8–20)
CALCIUM SERPL-MCNC: 9.6 MG/DL — SIGNIFICANT CHANGE UP (ref 8.6–10.2)
CHLORIDE SERPL-SCNC: 94 MMOL/L — LOW (ref 98–107)
CO2 SERPL-SCNC: 22 MMOL/L — SIGNIFICANT CHANGE UP (ref 22–29)
CREAT SERPL-MCNC: 4.24 MG/DL — HIGH (ref 0.5–1.3)
EGFR: 13 ML/MIN/1.73M2 — LOW
EOSINOPHIL # BLD AUTO: 0.14 K/UL — SIGNIFICANT CHANGE UP (ref 0–0.5)
EOSINOPHIL NFR BLD AUTO: 0.9 % — SIGNIFICANT CHANGE UP (ref 0–6)
GIANT PLATELETS BLD QL SMEAR: PRESENT — SIGNIFICANT CHANGE UP
GLUCOSE SERPL-MCNC: 97 MG/DL — SIGNIFICANT CHANGE UP (ref 70–99)
HCT VFR BLD CALC: 36.5 % — SIGNIFICANT CHANGE UP (ref 34.5–45)
HGB BLD-MCNC: 12.4 G/DL — SIGNIFICANT CHANGE UP (ref 11.5–15.5)
LIDOCAIN IGE QN: 73 U/L — HIGH (ref 22–51)
LYMPHOCYTES # BLD AUTO: 45.2 % — HIGH (ref 13–44)
LYMPHOCYTES # BLD AUTO: 6.79 K/UL — HIGH (ref 1–3.3)
MANUAL SMEAR VERIFICATION: SIGNIFICANT CHANGE UP
MCHC RBC-ENTMCNC: 28.6 PG — SIGNIFICANT CHANGE UP (ref 27–34)
MCHC RBC-ENTMCNC: 34 GM/DL — SIGNIFICANT CHANGE UP (ref 32–36)
MCV RBC AUTO: 84.1 FL — SIGNIFICANT CHANGE UP (ref 80–100)
MONOCYTES # BLD AUTO: 0.78 K/UL — SIGNIFICANT CHANGE UP (ref 0–0.9)
MONOCYTES NFR BLD AUTO: 5.2 % — SIGNIFICANT CHANGE UP (ref 2–14)
NEUTROPHILS # BLD AUTO: 6.53 K/UL — SIGNIFICANT CHANGE UP (ref 1.8–7.4)
NEUTROPHILS NFR BLD AUTO: 43.5 % — SIGNIFICANT CHANGE UP (ref 43–77)
PLAT MORPH BLD: NORMAL — SIGNIFICANT CHANGE UP
PLATELET # BLD AUTO: 630 K/UL — HIGH (ref 150–400)
POTASSIUM SERPL-MCNC: 5.5 MMOL/L — HIGH (ref 3.5–5.3)
POTASSIUM SERPL-SCNC: 5.5 MMOL/L — HIGH (ref 3.5–5.3)
PROT SERPL-MCNC: 7.8 G/DL — SIGNIFICANT CHANGE UP (ref 6.6–8.7)
RBC # BLD: 4.34 M/UL — SIGNIFICANT CHANGE UP (ref 3.8–5.2)
RBC # FLD: 13.6 % — SIGNIFICANT CHANGE UP (ref 10.3–14.5)
RBC BLD AUTO: NORMAL — SIGNIFICANT CHANGE UP
SMUDGE CELLS # BLD: PRESENT — SIGNIFICANT CHANGE UP
SODIUM SERPL-SCNC: 131 MMOL/L — LOW (ref 135–145)
VARIANT LYMPHS # BLD: 5.2 % — SIGNIFICANT CHANGE UP (ref 0–6)
WBC # BLD: 15.02 K/UL — HIGH (ref 3.8–10.5)
WBC # FLD AUTO: 15.02 K/UL — HIGH (ref 3.8–10.5)

## 2022-07-20 PROCEDURE — 99285 EMERGENCY DEPT VISIT HI MDM: CPT

## 2022-07-20 RX ORDER — MORPHINE SULFATE 50 MG/1
4 CAPSULE, EXTENDED RELEASE ORAL ONCE
Refills: 0 | Status: DISCONTINUED | OUTPATIENT
Start: 2022-07-20 | End: 2022-07-20

## 2022-07-20 RX ORDER — ONDANSETRON 8 MG/1
4 TABLET, FILM COATED ORAL ONCE
Refills: 0 | Status: COMPLETED | OUTPATIENT
Start: 2022-07-20 | End: 2022-07-20

## 2022-07-20 RX ORDER — ACETAMINOPHEN 500 MG
1000 TABLET ORAL ONCE
Refills: 0 | Status: COMPLETED | OUTPATIENT
Start: 2022-07-20 | End: 2022-07-20

## 2022-07-20 RX ORDER — SODIUM CHLORIDE 9 MG/ML
2000 INJECTION, SOLUTION INTRAVENOUS ONCE
Refills: 0 | Status: COMPLETED | OUTPATIENT
Start: 2022-07-20 | End: 2022-07-20

## 2022-07-20 RX ADMIN — MORPHINE SULFATE 4 MILLIGRAM(S): 50 CAPSULE, EXTENDED RELEASE ORAL at 23:21

## 2022-07-20 RX ADMIN — SODIUM CHLORIDE 2000 MILLILITER(S): 9 INJECTION, SOLUTION INTRAVENOUS at 23:30

## 2022-07-20 RX ADMIN — Medication 400 MILLIGRAM(S): at 23:20

## 2022-07-20 RX ADMIN — ONDANSETRON 4 MILLIGRAM(S): 8 TABLET, FILM COATED ORAL at 23:20

## 2022-07-20 NOTE — ED STATDOCS - PROGRESS NOTE DETAILS
Aníbal Cannon for ED attending, Dr. Mackay. 45 y/o female with PMHx of autoimmune hepatitis, stage 3 kidney disease presents with vomiting, abdominal pain since Sunday. Reports she is having a chronic pancreatitis flare up. Will send to [facility] for further evaluation by another provider.

## 2022-07-20 NOTE — ED STATDOCS - CHIEF COMPLAINT
"Megan Chiangok Gar was seen and treated in our emergency department on 5/17/2022.  She may return to work on 05/19/2022.       If you have any questions or concerns, please don't hesitate to call.      Courtney FAJARDO RN    " The patient is a 46y year old Female complaining of abdominal pain.

## 2022-07-20 NOTE — ED ADULT TRIAGE NOTE - CHIEF COMPLAINT QUOTE
Ambulatory reporting flare of chronic pancreatitis since starting her new hydrocortisone for auto-immune hepatitis. Patient reports abdominal pain and vomiting unrelieved by pepto bismuth or Maalox.

## 2022-07-20 NOTE — ED STATDOCS - NS_ ATTENDINGSCRIBEDETAILS _ED_A_ED_FT
I, Bonilla Mackay, performed the initial face to face bedside interview with this patient regarding history of present illness, review of symptoms and relevant past medical, social and family history.  I completed an independent physical examination.  I was the initial provider who evaluated this patient. I have signed out the follow up of any pending tests (i.e. labs, radiological studies) to the ACP.  I have communicated the patient’s plan of care and disposition with the ACP.  The history, relevant review of systems, past medical and surgical history, medical decision making, and physical examination was documented by the scribe in my presence and I attest to the accuracy of the documentation.

## 2022-07-21 DIAGNOSIS — N17.9 ACUTE KIDNEY FAILURE, UNSPECIFIED: ICD-10-CM

## 2022-07-21 DIAGNOSIS — Z98.891 HISTORY OF UTERINE SCAR FROM PREVIOUS SURGERY: Chronic | ICD-10-CM

## 2022-07-21 LAB
ALBUMIN SERPL ELPH-MCNC: 3.6 G/DL — SIGNIFICANT CHANGE UP (ref 3.3–5.2)
ALP SERPL-CCNC: 58 U/L — SIGNIFICANT CHANGE UP (ref 40–120)
ALT FLD-CCNC: 15 U/L — SIGNIFICANT CHANGE UP
ANION GAP SERPL CALC-SCNC: 12 MMOL/L — SIGNIFICANT CHANGE UP (ref 5–17)
ANION GAP SERPL CALC-SCNC: 12 MMOL/L — SIGNIFICANT CHANGE UP (ref 5–17)
APPEARANCE UR: CLEAR — SIGNIFICANT CHANGE UP
AST SERPL-CCNC: 18 U/L — SIGNIFICANT CHANGE UP
BACTERIA # UR AUTO: ABNORMAL
BILIRUB SERPL-MCNC: 0.8 MG/DL — SIGNIFICANT CHANGE UP (ref 0.4–2)
BILIRUB UR-MCNC: NEGATIVE — SIGNIFICANT CHANGE UP
BUN SERPL-MCNC: 26.1 MG/DL — HIGH (ref 8–20)
BUN SERPL-MCNC: 33.7 MG/DL — HIGH (ref 8–20)
CALCIUM SERPL-MCNC: 7.9 MG/DL — LOW (ref 8.6–10.2)
CALCIUM SERPL-MCNC: 8.7 MG/DL — SIGNIFICANT CHANGE UP (ref 8.6–10.2)
CHLORIDE SERPL-SCNC: 100 MMOL/L — SIGNIFICANT CHANGE UP (ref 98–107)
CHLORIDE SERPL-SCNC: 94 MMOL/L — LOW (ref 98–107)
CO2 SERPL-SCNC: 19 MMOL/L — LOW (ref 22–29)
CO2 SERPL-SCNC: 23 MMOL/L — SIGNIFICANT CHANGE UP (ref 22–29)
COLOR SPEC: YELLOW — SIGNIFICANT CHANGE UP
COMMENT - URINE: SIGNIFICANT CHANGE UP
CREAT SERPL-MCNC: 2.25 MG/DL — HIGH (ref 0.5–1.3)
CREAT SERPL-MCNC: 3.18 MG/DL — HIGH (ref 0.5–1.3)
DIFF PNL FLD: NEGATIVE — SIGNIFICANT CHANGE UP
EGFR: 18 ML/MIN/1.73M2 — LOW
EGFR: 27 ML/MIN/1.73M2 — LOW
EPI CELLS # UR: ABNORMAL
GLUCOSE SERPL-MCNC: 79 MG/DL — SIGNIFICANT CHANGE UP (ref 70–99)
GLUCOSE SERPL-MCNC: 88 MG/DL — SIGNIFICANT CHANGE UP (ref 70–99)
GLUCOSE UR QL: NEGATIVE MG/DL — SIGNIFICANT CHANGE UP
HCG SERPL-ACNC: <4 MIU/ML — SIGNIFICANT CHANGE UP
HCT VFR BLD CALC: 32.8 % — LOW (ref 34.5–45)
HGB BLD-MCNC: 10.7 G/DL — LOW (ref 11.5–15.5)
HYALINE CASTS # UR AUTO: ABNORMAL /LPF
KETONES UR-MCNC: ABNORMAL
LEUKOCYTE ESTERASE UR-ACNC: NEGATIVE — SIGNIFICANT CHANGE UP
LIDOCAIN IGE QN: 123 U/L — HIGH (ref 22–51)
MAGNESIUM SERPL-MCNC: 2 MG/DL — SIGNIFICANT CHANGE UP (ref 1.6–2.6)
MCHC RBC-ENTMCNC: 28.1 PG — SIGNIFICANT CHANGE UP (ref 27–34)
MCHC RBC-ENTMCNC: 32.6 GM/DL — SIGNIFICANT CHANGE UP (ref 32–36)
MCV RBC AUTO: 86.1 FL — SIGNIFICANT CHANGE UP (ref 80–100)
NITRITE UR-MCNC: NEGATIVE — SIGNIFICANT CHANGE UP
PH UR: 5 — SIGNIFICANT CHANGE UP (ref 5–8)
PHOSPHATE SERPL-MCNC: 4.7 MG/DL — SIGNIFICANT CHANGE UP (ref 2.4–4.7)
PLATELET # BLD AUTO: 521 K/UL — HIGH (ref 150–400)
POTASSIUM SERPL-MCNC: 4.5 MMOL/L — SIGNIFICANT CHANGE UP (ref 3.5–5.3)
POTASSIUM SERPL-MCNC: 4.9 MMOL/L — SIGNIFICANT CHANGE UP (ref 3.5–5.3)
POTASSIUM SERPL-SCNC: 4.5 MMOL/L — SIGNIFICANT CHANGE UP (ref 3.5–5.3)
POTASSIUM SERPL-SCNC: 4.9 MMOL/L — SIGNIFICANT CHANGE UP (ref 3.5–5.3)
PROCALCITONIN SERPL-MCNC: 0.07 NG/ML — SIGNIFICANT CHANGE UP (ref 0.02–0.1)
PROT SERPL-MCNC: 6.2 G/DL — LOW (ref 6.6–8.7)
PROT UR-MCNC: NEGATIVE — SIGNIFICANT CHANGE UP
RBC # BLD: 3.81 M/UL — SIGNIFICANT CHANGE UP (ref 3.8–5.2)
RBC # FLD: 13.8 % — SIGNIFICANT CHANGE UP (ref 10.3–14.5)
RBC CASTS # UR COMP ASSIST: ABNORMAL /HPF (ref 0–4)
SARS-COV-2 RNA SPEC QL NAA+PROBE: SIGNIFICANT CHANGE UP
SODIUM SERPL-SCNC: 129 MMOL/L — LOW (ref 135–145)
SODIUM SERPL-SCNC: 131 MMOL/L — LOW (ref 135–145)
SP GR SPEC: 1.02 — SIGNIFICANT CHANGE UP (ref 1.01–1.02)
UROBILINOGEN FLD QL: NEGATIVE MG/DL — SIGNIFICANT CHANGE UP
WBC # BLD: 11.64 K/UL — HIGH (ref 3.8–10.5)
WBC # FLD AUTO: 11.64 K/UL — HIGH (ref 3.8–10.5)
WBC UR QL: ABNORMAL /HPF (ref 0–5)

## 2022-07-21 PROCEDURE — 74176 CT ABD & PELVIS W/O CONTRAST: CPT | Mod: 26,MA

## 2022-07-21 PROCEDURE — 99223 1ST HOSP IP/OBS HIGH 75: CPT

## 2022-07-21 PROCEDURE — 12345: CPT | Mod: NC

## 2022-07-21 PROCEDURE — 93010 ELECTROCARDIOGRAM REPORT: CPT

## 2022-07-21 RX ORDER — HYDROMORPHONE HYDROCHLORIDE 2 MG/ML
1 INJECTION INTRAMUSCULAR; INTRAVENOUS; SUBCUTANEOUS EVERY 4 HOURS
Refills: 0 | Status: DISCONTINUED | OUTPATIENT
Start: 2022-07-21 | End: 2022-07-22

## 2022-07-21 RX ORDER — HYDROCORTISONE 20 MG
5 TABLET ORAL DAILY
Refills: 0 | Status: DISCONTINUED | OUTPATIENT
Start: 2022-07-21 | End: 2022-07-22

## 2022-07-21 RX ORDER — ACETAMINOPHEN 500 MG
650 TABLET ORAL EVERY 6 HOURS
Refills: 0 | Status: DISCONTINUED | OUTPATIENT
Start: 2022-07-21 | End: 2022-07-22

## 2022-07-21 RX ORDER — GLUCAGON INJECTION, SOLUTION 0.5 MG/.1ML
1 INJECTION, SOLUTION SUBCUTANEOUS ONCE
Refills: 0 | Status: DISCONTINUED | OUTPATIENT
Start: 2022-07-21 | End: 2022-07-22

## 2022-07-21 RX ORDER — LEVOTHYROXINE SODIUM 125 MCG
50 TABLET ORAL AT BEDTIME
Refills: 0 | Status: DISCONTINUED | OUTPATIENT
Start: 2022-07-21 | End: 2022-07-22

## 2022-07-21 RX ORDER — DEXTROSE 50 % IN WATER 50 %
12.5 SYRINGE (ML) INTRAVENOUS ONCE
Refills: 0 | Status: DISCONTINUED | OUTPATIENT
Start: 2022-07-21 | End: 2022-07-22

## 2022-07-21 RX ORDER — PANTOPRAZOLE SODIUM 20 MG/1
40 TABLET, DELAYED RELEASE ORAL DAILY
Refills: 0 | Status: DISCONTINUED | OUTPATIENT
Start: 2022-07-21 | End: 2022-07-22

## 2022-07-21 RX ORDER — HYDROMORPHONE HYDROCHLORIDE 2 MG/ML
0.5 INJECTION INTRAMUSCULAR; INTRAVENOUS; SUBCUTANEOUS EVERY 4 HOURS
Refills: 0 | Status: DISCONTINUED | OUTPATIENT
Start: 2022-07-21 | End: 2022-07-22

## 2022-07-21 RX ORDER — DEXTROSE 50 % IN WATER 50 %
15 SYRINGE (ML) INTRAVENOUS ONCE
Refills: 0 | Status: DISCONTINUED | OUTPATIENT
Start: 2022-07-21 | End: 2022-07-22

## 2022-07-21 RX ORDER — HEPARIN SODIUM 5000 [USP'U]/ML
5000 INJECTION INTRAVENOUS; SUBCUTANEOUS EVERY 8 HOURS
Refills: 0 | Status: DISCONTINUED | OUTPATIENT
Start: 2022-07-21 | End: 2022-07-22

## 2022-07-21 RX ORDER — DEXTROSE 50 % IN WATER 50 %
25 SYRINGE (ML) INTRAVENOUS ONCE
Refills: 0 | Status: DISCONTINUED | OUTPATIENT
Start: 2022-07-21 | End: 2022-07-22

## 2022-07-21 RX ORDER — ONDANSETRON 8 MG/1
4 TABLET, FILM COATED ORAL EVERY 8 HOURS
Refills: 0 | Status: DISCONTINUED | OUTPATIENT
Start: 2022-07-21 | End: 2022-07-22

## 2022-07-21 RX ORDER — SODIUM CHLORIDE 9 MG/ML
1000 INJECTION INTRAMUSCULAR; INTRAVENOUS; SUBCUTANEOUS
Refills: 0 | Status: COMPLETED | OUTPATIENT
Start: 2022-07-21 | End: 2022-07-21

## 2022-07-21 RX ORDER — SODIUM CHLORIDE 9 MG/ML
1000 INJECTION, SOLUTION INTRAVENOUS
Refills: 0 | Status: DISCONTINUED | OUTPATIENT
Start: 2022-07-21 | End: 2022-07-22

## 2022-07-21 RX ORDER — LEVOTHYROXINE SODIUM 125 MCG
1 TABLET ORAL
Qty: 0 | Refills: 0 | DISCHARGE

## 2022-07-21 RX ORDER — HYDROCORTISONE 20 MG
1 TABLET ORAL
Qty: 0 | Refills: 0 | DISCHARGE

## 2022-07-21 RX ADMIN — Medication 1000 MILLIGRAM(S): at 03:16

## 2022-07-21 RX ADMIN — HYDROMORPHONE HYDROCHLORIDE 0.5 MILLIGRAM(S): 2 INJECTION INTRAMUSCULAR; INTRAVENOUS; SUBCUTANEOUS at 06:11

## 2022-07-21 RX ADMIN — Medication 5 MILLIGRAM(S): at 16:34

## 2022-07-21 RX ADMIN — MORPHINE SULFATE 4 MILLIGRAM(S): 50 CAPSULE, EXTENDED RELEASE ORAL at 03:16

## 2022-07-21 RX ADMIN — SODIUM CHLORIDE 100 MILLILITER(S): 9 INJECTION INTRAMUSCULAR; INTRAVENOUS; SUBCUTANEOUS at 06:16

## 2022-07-21 RX ADMIN — HEPARIN SODIUM 5000 UNIT(S): 5000 INJECTION INTRAVENOUS; SUBCUTANEOUS at 06:11

## 2022-07-21 RX ADMIN — Medication 50 MICROGRAM(S): at 22:10

## 2022-07-21 RX ADMIN — SODIUM CHLORIDE 2000 MILLILITER(S): 9 INJECTION, SOLUTION INTRAVENOUS at 03:15

## 2022-07-21 RX ADMIN — SODIUM CHLORIDE 100 MILLILITER(S): 9 INJECTION INTRAMUSCULAR; INTRAVENOUS; SUBCUTANEOUS at 16:33

## 2022-07-21 RX ADMIN — Medication 1000 MILLIGRAM(S): at 03:15

## 2022-07-21 RX ADMIN — HEPARIN SODIUM 5000 UNIT(S): 5000 INJECTION INTRAVENOUS; SUBCUTANEOUS at 22:10

## 2022-07-21 RX ADMIN — HYDROMORPHONE HYDROCHLORIDE 0.5 MILLIGRAM(S): 2 INJECTION INTRAMUSCULAR; INTRAVENOUS; SUBCUTANEOUS at 06:41

## 2022-07-21 RX ADMIN — ONDANSETRON 4 MILLIGRAM(S): 8 TABLET, FILM COATED ORAL at 06:11

## 2022-07-21 RX ADMIN — PANTOPRAZOLE SODIUM 40 MILLIGRAM(S): 20 TABLET, DELAYED RELEASE ORAL at 16:33

## 2022-07-21 RX ADMIN — HEPARIN SODIUM 5000 UNIT(S): 5000 INJECTION INTRAVENOUS; SUBCUTANEOUS at 16:34

## 2022-07-21 NOTE — ED ADULT NURSE REASSESSMENT NOTE - NS ED NURSE REASSESS COMMENT FT1
patient awake alert oriented x 3 denies discomfort at this time. ambulates by self. plan for admit for renal disease

## 2022-07-21 NOTE — ED PROVIDER NOTE - CLINICAL SUMMARY MEDICAL DECISION MAKING FREE TEXT BOX
Diabetic foot ulcer. Will get labs, xrays. blood cultures. HO pancreatitis with abdominal pain and CKD. Decrease urine. Will control pain, get labs, and imagine.

## 2022-07-21 NOTE — PROGRESS NOTE ADULT - ASSESSMENT
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 She was changed regimen from Budesonide to hydrocortisone, shortly after started to have these symptoms. Labs on admission, revealed PAZ on CKD, hyperkalemia, hyponatremia, mild elevated lipase and leucocytosis. CT abdomen without acute pathology, a left ovarian cyst. IVF was initiated, she was placed on a clear liquid diet. GI was consulted. 46 yr old female with autoimmune hepatitis on steroids, chronic pancreatitis, hypothyroidism, hyperlipidemia, CKD stage 3 presented with complaints of nausea, vomiting, abdominal pain. She was changed regimen from Budesonide to hydrocortisone, shortly after started to have these symptoms. Labs on admission, revealed PAZ on CKD, hyperkalemia, hyponatremia, mild elevated lipase and leucocytosis. CT abdomen without acute pathology, a left ovarian cyst. IVF was initiated, she was placed on a clear liquid diet. GI was consulted.    1. PAZ on CKD:  Continue IVF  monitor BMP  sec dehydration    2. Nausea/vomiting:  Likely viral  GI input noted  Zofran prn  PPI  clear liquid diet    3. Autoimmune hepatitis:  Continue Hydrocortisone.    4. Hypothyroidism:  Continue Synthroid.    5, DVT ppx:  Heparin.    Discussed with patient.  Dispo pending improvement in renal function.

## 2022-07-21 NOTE — PATIENT PROFILE ADULT - MONEY FOR FOOD
[facility] -   Hematology/Oncology  Progress Note    Patient Name: Nico Garza Jr.  Admission Date: 11/20/2018  Hospital Length of Stay: 2 days  Code Status: DNR     Subjective:     HPI:  74 y.o. male patient with a hx of DM, HTN, and NSCLC with mets to bone and liver who presented to the ED with c/o worsening SOB, unresponsive to home O2 at 4L per nasal cannula. Of note, patient was seen in ED for similar sxs 1 week ago. He was admitted and discharged 11/18/18. Symptoms are constant and moderate in severity. Worsened with exertion, relieved by nothing.  Associated sxs include cough,  weakness, BLE swelling, left sided CP, and back pain. Patient denies any fever, chills, n/v/d, abd pain, weakness, numbness, palpitations, and all other sxs at this time.  CTA of the chest shows alveolar and interstitial opacities are seen throughout the left lower lobe and lingula and to a lesser degree within the left upper lobe consistent with airspace disease or aspiration. Multiple pulmonary nodules are seen within the left lower lobe measuring up to 1.2 cm.  Evaluation is limited due to patchy consolidation throughout the left lung.  1.3 cm nodule within the right upper lobe likely reflects metastatic disease..  Patchy infiltrate seen within the right middle lobe.  Mild bronchiectasis and infiltration within the right middle lobe also noted.  Severe emphysematous changes are seen throughout the superior segments of the lower lobes and bilateral upper lobes.   Troponin negative. Lactic acid normal. UA neg for infection. . BC:NGTD    Patient is s/p cycle 2 Keytruda which was received on 11/08/2018.  Patient is also receiving palliative radiation to lumbar spine          Interval History: No acute events overnight. Tolerating IV antibiotics.  Reports minimal improvement in SOB.  Reports generalized pain 3/10, control with p.o. pain medication.  Discussed with patient whether or not he would like to proceed  with active immunotherapy treatment versus comfort care.  He continues to think on this and will let us know his decision.    Oncology Treatment Plan:   OP PEMBROLIZUMAB 200MG Q3W    Medications:  Continuous Infusions:   sodium chloride 0.9% 75 mL/hr at 11/20/18 1911     Scheduled Meds:   albuterol-ipratropium  3 mL Nebulization Q6H    apixaban  5 mg Oral BID    ascorbic acid (vitamin C)  1,000 mg Oral Daily    ciprofloxacin (CIPRO)400mg/200ml D5W IVPB  400 mg Intravenous Q12H    diltiaZEM  360 mg Oral Daily    fentaNYL  1 patch Transdermal Q72H    flecainide  100 mg Oral Q12H    pantoprazole  40 mg Oral Daily    piperacillin-tazobactam (ZOSYN) IVPB  4.5 g Intravenous Q8H    polyethylene glycol  17 g Oral Daily    pravastatin  40 mg Oral Nightly    sertraline  50 mg Oral Daily    vancomycin (VANCOCIN) IVPB  1,250 mg Intravenous Q12H    vitamin D  1,000 Units Oral Daily    vitamin renal formula (B-complex-vitamin c-folic acid)  1 capsule Oral Daily     PRN Meds:albuterol-ipratropium, clonazePAM, dextrose 50%, dextrose 50%, glucagon (human recombinant), glucose, glucose, insulin aspart U-100, oxyCODONE, sodium chloride     Review of patient's allergies indicates:   Allergen Reactions    Anoro ellipta [umeclidinium-vilanterol] Shortness Of Breath    Flomax [tamsulosin]      Dizzy          Past Medical History:   Diagnosis Date    Arthritis     Atrial fibrillation and flutter     Atrial flutter     Cancer     LUNG    COPD (chronic obstructive pulmonary disease)     COPD (chronic obstructive pulmonary disease) with emphysema 11/18/2013    DM (diabetes mellitus) 1996    Hyperlipidemia     Hypertension     Kidney cysts     ct urogram 12/15/2017-2 cysts within the left kidney.  Others are too small to accurately characterize.    Lung disease     Nephrolithiasis     ct urogram 12/15/2017-Bilateral nephrolithiasis.     Neuropathy, diabetic     Pancreatitis     Respiratory failure     Stage 4  lung cancer     Type 1 diabetes mellitus with diabetic neuropathy 4/27/2018     Past Surgical History:   Procedure Laterality Date    ABLATION N/A 12/4/2017    Performed by Jaret Freire MD at [facility] CATH LAB    BICEPS TENDON REPAIR Right     BRONCHOSCOPY Bilateral 9/2/2018    Procedure: BRONCHOSCOPY- insert lighted tube into airway to obtain biopsy of lung;  Surgeon: Aldair Deleon MD;  Location: [facility] ENDO;  Service: Endoscopy;  Laterality: Bilateral;    BRONCHOSCOPY- insert lighted tube into airway to obtain biopsy of lung Bilateral 9/2/2018    Performed by Aldair Deleon MD at [facility] ENDO    CARDIOVERSION      CHOLECYSTECTOMY      INSERTION OF TUNNELED CENTRAL VENOUS CATHETER (CVC) WITH SUBCUTANEOUS PORT Right 10/9/2018    Procedure: [id number]-CATH;  Surgeon: Christophe Brandt MD;  Location: [facility] OR;  Service: General;  Laterality: Right;    [id number]-T-CATH Right 10/9/2018    Performed by Christophe Brandt MD at [facility] OR    PATELLA SURGERY Right     RADIOFREQUENCY ABLATION      ROTATOR CUFF REPAIR Left     TRANSESOPHAGEAL ECHOCARDIOGRAM (DILLAN) N/A 12/4/2017    Performed by Jaret Freire MD at [facility] CATH LAB    ULTRASOUND GUIDANCE Right 10/9/2018    Procedure: ULTRASOUND GUIDANCE;  Surgeon: Christophe Brandt MD;  Location: [facility] OR;  Service: General;  Laterality: Right;    ULTRASOUND GUIDANCE Right 10/9/2018    Performed by Christophe Brandt MD at [facility] OR     Family History     Problem Relation (Age of Onset)    Cancer Maternal Aunt    Cataracts Sister    Diabetes Mother, Sister, Sister, Sister    Heart attack Brother    Heart failure Brother    Hypertension Mother, Father    Stroke Mother, Father, Paternal Grandmother, Paternal Grandfather        Tobacco Use    Smoking status: Former Smoker     Packs/day: 0.50     Types: Cigarettes     Start date: 1/1/1960     Last attempt to quit: 3/8/2015     Years since quitting: 3.7    Smokeless tobacco: Former User     Types: Snuff     Quit date:  10/7/1995   Substance and Sexual Activity    Alcohol use: No     Alcohol/week: 0.0 oz    Drug use: No    Sexual activity: Not on file       Review of Systems   Constitutional: Positive for activity change and fatigue. Negative for appetite change, chills, diaphoresis, fever and unexpected weight change.   HENT: Negative for congestion, mouth sores, nosebleeds, sore throat, trouble swallowing and voice change.    Eyes: Negative for visual disturbance.   Respiratory: Positive for cough and shortness of breath. Negative for apnea, choking, chest tightness, wheezing and stridor.    Cardiovascular: Positive for chest pain and leg swelling. Negative for palpitations.   Gastrointestinal: Negative for abdominal distention, abdominal pain, anal bleeding, blood in stool, constipation, diarrhea, nausea and vomiting.   Genitourinary: Negative for difficulty urinating, dysuria and hematuria.   Musculoskeletal: Positive for arthralgias, back pain and myalgias.   Skin: Negative for pallor and rash.   Allergic/Immunologic: Positive for immunocompromised state.   Neurological: Positive for weakness. Negative for dizziness, syncope, light-headedness, numbness and headaches.   Hematological: Negative for adenopathy. Bruises/bleeds easily.   Psychiatric/Behavioral: The patient is nervous/anxious.      Objective:     Vital Signs (Most Recent):  Temp: 98 °F (36.7 °C) (11/22/18 0716)  Pulse: 85 (11/22/18 0905)  Resp: 18 (11/22/18 0736)  BP: 117/62 (11/22/18 0716)  SpO2: 96 % (11/22/18 0736) Vital Signs (24h Range):  Temp:  [98 °F (36.7 °C)-98.7 °F (37.1 °C)] 98 °F (36.7 °C)  Pulse:  [70-91] 85  Resp:  [16-20] 18  SpO2:  [88 %-96 %] 96 %  BP: (113-139)/(54-63) 117/62     Weight: 79.6 kg (175 lb 7.8 oz)  Body mass index is 26.68 kg/m².  Body surface area is 1.95 meters squared.      Intake/Output Summary (Last 24 hours) at 11/22/2018 1017  Last data filed at 11/22/2018 0649  Gross per 24 hour   Intake 1835 ml   Output 550 ml   Net 1285  ml       Physical Exam   Constitutional: He is oriented to person, place, and time. He appears well-developed and well-nourished. He is cooperative. He has a sickly appearance. He appears ill. Nasal cannula in place.   HENT:   Head: Normocephalic.   Right Ear: Hearing normal. No drainage.   Left Ear: Hearing normal. No drainage.   Nose: Nose normal.   Mouth/Throat: Oropharynx is clear and moist.   Eyes: Conjunctivae, EOM and lids are normal. Right eye exhibits no discharge. Left eye exhibits no discharge. No scleral icterus.   Neck: Normal range of motion. No thyroid mass present.   Cardiovascular: Normal rate and normal heart sounds. An irregular rhythm present.   No murmur heard.  Pulmonary/Chest: Effort normal. No respiratory distress. He has decreased breath sounds. He has no wheezes. He has no rhonchi. He has no rales.   Abdominal: Soft. He exhibits no distension. Bowel sounds are decreased. There is no tenderness.   Genitourinary:   Genitourinary Comments: deferred   Musculoskeletal: Normal range of motion.        Right ankle: He exhibits swelling.        Left ankle: He exhibits swelling.        Right lower leg: He exhibits edema.        Left lower leg: He exhibits edema.   Mild BLE edema   Lymphadenopathy:        Head (right side): No submandibular, no preauricular and no posterior auricular adenopathy present.        Head (left side): No submandibular, no preauricular and no posterior auricular adenopathy present.        Right cervical: No superficial cervical adenopathy present.       Left cervical: No superficial cervical adenopathy present.   Neurological: He is alert and oriented to person, place, and time.   Skin: Skin is warm, dry and intact.   Psychiatric: His speech is normal and behavior is normal. Thought content normal. His mood appears anxious.   Vitals reviewed.      Significant Labs:   BMP:   Recent Labs   Lab 11/20/18  1205 11/21/18  0411 11/22/18  0422   * 285* 288*    134* 131*    K 4.9 4.1 4.1   CL 95 95 92*   CO2 29 31* 28   BUN 21 16 12   CREATININE 0.8 0.8 0.8   CALCIUM 9.5 8.9 9.0   , CBC:   Recent Labs   Lab 11/20/18  1205 11/21/18  0411 11/22/18  0422   WBC 13.39* 9.19 8.67   HGB 9.8* 9.0* 9.1*   HCT 31.4* 29.6* 29.4*   * 394* 375*   , CMP:   Recent Labs   Lab 11/20/18  1205 11/21/18  0411 11/22/18  0422    134* 131*   K 4.9 4.1 4.1   CL 95 95 92*   CO2 29 31* 28   * 285* 288*   BUN 21 16 12   CREATININE 0.8 0.8 0.8   CALCIUM 9.5 8.9 9.0   PROT 7.0 6.5 7.0   ALBUMIN 2.4* 2.2* 2.4*   BILITOT 0.5 0.4 0.4   ALKPHOS 181* 167* 168*   AST 47* 49* 31   ALT 28 25 26   ANIONGAP 13 8 11   EGFRNONAA >60 >60 >60   , LFTs:   Recent Labs   Lab 11/20/18  1205 11/21/18 0411 11/22/18  0422   ALT 28 25 26   AST 47* 49* 31   ALKPHOS 181* 167* 168*   BILITOT 0.5 0.4 0.4   PROT 7.0 6.5 7.0   ALBUMIN 2.4* 2.2* 2.4*   , Tumor Markers: No results for input(s): PSA, CEA, , AFPTM, IF1893,  in the last 48 hours.    Invalid input(s): ALGTM and All pertinent labs from the last 24 hours have been reviewed.    Diagnostic Results:  I have reviewed all pertinent imaging results/findings within the past 24 hours.   CTA Chest Non-Coronary (PE Study)   Order: [id number]   Status:  Final result   Visible to patient:  No (Not Released)   Next appt:  11/29/2018 at 08:50 AM in Lab ([facility] LAB)   Details     Reading Physician Reading Date Result Priority   Jos Brito MD 11/20/2018       Narrative     EXAMINATION:  CTA CHEST NON CORONARY    CLINICAL HISTORY:  Chest pain, acute, PE suspected, high pretest prob;    TECHNIQUE:  The chest was surveyed from the apices through the posterior costophrenic angles after administration of 100 cc of Omni 350 contrast using CT angiography technique.  Data was reconstructed for multiplanar images in axial, sagittal and coronal planes in for maximal intensity projection images in the axial plane.    COMPARISON:  None    FINDINGS:  Base of Neck:  No significant abnormality.    Airways: Patent.    Lungs: Alveolar and interstitial opacities are seen throughout the left lower lobe and lingula and to a lesser degree within the left upper lobe consistent with airspace disease or aspiration.  Multiple pulmonary nodules are seen within the left lower lobe measuring up to 1.2 cm.  Evaluation is limited due to patchy consolidation throughout the left lung.  1.3 cm nodule within the right upper lobe likely reflects metastatic disease..  Patchy infiltrate seen within the right middle lobe.  Mild bronchiectasis and infiltration within the right middle lobe also noted.  Severe emphysematous changes are seen throughout the superior segments of the lower lobes and bilateral upper lobes.    Pleura: No pleural fluid.No pleural calcification.    Roz/Mediastinum: Extensive mediastinal and left hilar adenopathy.  Mass is seen within the AP window extending along the lateral hilum into the left hilar region measuring at least 5.9 x 4.7 cm concerning for metastatic disease.  Enlarged lymph nodes are seen throughout the mediastinum largest is in the precarinal region measuring 2 cm concerning for metastatic disease.  Elevation left hemidiaphragm noted.    Esophagus: Normal.    Heart/pericardium: Normal size.  Lipomatosis hypertrophy of the interatrial septum is noted.  No pericardial effusion or calcification.    Pulmonary vasculature: No evidence of pulmonary embolism.  Pulmonary arteries distribute normally.  There are four pulmonary veins.    Aorta: Left-sided aortic arch with 3 arterial branches.  The aorta maintains normal caliber, contour and course. There is mild-to-moderate calcification of the thoracic aorta.  There is  severe coronary artery calcification.    Thoracic soft tissues: Normal. Both breasts are present.    Bones: Diffuse osteopenia and multiple mixed sclerotic and lytic lesions throughout the thoracic and lumbar skele spine ton largest within the L1  vertebral body measuring 2.5 x 2.9 cm consistent with metastatic disease.  Bones are diffusely osteopenic which limits evaluation for metastatic disease    Upper Abdomen: Punctate nonobstructing stones within the right kidney.  Indeterminate hypodensities within the kidneys likely reflect small cysts.  Moderate constipation.  Gallbladder is surgically absent.  Indeterminate 5 x 4.3 cm mass within the right hepatic lobe likely reflects metastatic disease.  Nodular thickening of the adrenal glands noted.      Impression       No evidence of pulmonary embolism.    Consolidation throughout the left lung and to a lesser degree within the right middle lobe thought to reflect airspace disease or aspiration.    Large mass within the mediastinum and left hilum as well as several pulmonary nodules concerning for metastatic disease.    Liver lesions concerning for metastatic disease.    Multiple lytic lesions throughout the thoracic skeleton largest within the L1 vertebral body consistent with metastatic disease    Diffuse thickening of the stomach could reflect line and dysplastic of and diffuse infiltrative malignancy.  Recommend endoscopy.    Moderate constipation.    See above for additional findings    All CT scans at this facility use dose modulation, iterative reconstruction and/or weight based dosing when appropriate to reduce radiation dose to as low as reasonably achievable.               Assessment/Plan:     Metastatic left lung cancer (metastasis from lung to other site) with mediastinal lymphadenopathy    11/21/18  --s/p cycle 2 Keytruda.  Keytruda currently on hold.  Patient will follow up outpatient in Heme-Onc clinic with Dr. Mcmanus to discuss further treatment recommendations pending current clinical situation  --patient also receiving palliative radiation to lumbar spine  --continue supportive care    November 22, 2018-I had a detailed discussion with the patient today with regard to his current clinical  situation.  Continue with all current supportive care/broad-spectrum antimicrobial therapy.  The patient will follow up in the outpatient clinic with his primary oncologist Dr. Lagos to discuss further management with regard to his metastatic malignancy.     Paroxysmal atrial fibrillation    --continue Eliquis 5 mg p.o. B.i.d.  --continue Cardizem daily  --continue telemetry monitoring     Healthcare-associated pneumonia    11/21/18  --patient afebrile  --continue broad-spectrum IV antibiotics  --continue IV hydration  --encouraged IS  --encouraged ambulation  --awaiting sputum culture  --continue supportive care    November 22, 2018-I have recommended proceeding with swallow study to assess for aspiration.  Discussed with Hospital Medicine.         Thank you for your consult.      Deric Sow MD  Hematology/Oncology  [facility] [place]     no

## 2022-07-21 NOTE — ED ADULT NURSE NOTE - NSICDXPASTMEDICALHX_GEN_ALL_CORE_FT
PAST MEDICAL HISTORY:  Autoimmune hepatitis     HLD (hyperlipidemia)     Hypothyroidism     Osteopenia     Other chronic pancreatitis     Ovarian cyst     Stage 3 chronic kidney disease

## 2022-07-21 NOTE — ED PROVIDER NOTE - PHYSICAL EXAMINATION
General: well appearing, NAD  Head: NC, AT  EENT: EOMI, no scleral icterus  Cardiac: RRR, no apparent murmurs, no lower extremity edema  Respiratory: CTABL, no respiratory distress   Abdomen: soft, ND, NT, nonperitonitic  MSK/Vascular: full ROM, soft compartments, warm extremities, tender swelling over medial left ankle, no crepitus, no tunneling of blister  Neuro: AAOx3, sensation to light touch intact  Psych: calm, cooperative General: well appearing, NAD  Head: NC, AT  EENT: EOMI, no scleral icterus  Cardiac: RRR, no apparent murmurs, no lower extremity edema  Respiratory: CTABL, no respiratory distress   Abdomen: soft, ND, diffusely tender abdomen, nonperitonitic  MSK/Vascular: full ROM, soft compartments, warm extremities,  Neuro: AAOx3, sensation to light touch intact  Psych: calm, cooperative

## 2022-07-21 NOTE — ED PROVIDER NOTE - NS ED ROS FT
Constitutional: no fever, no chills  Head: NC, AT   Eyes: no redness   ENMT: no nasal congestion/drainage, no sore throat   CV: no chest pain, no edema  Resp: no cough, no dyspnea  GI: no abdominal pain, no nausea, no vomiting, no diarrhea  : no dysuria, no hematuria   Skin: no lesions, no rashes   Neuro: no LOC, no headache, no sensory deficits, no weakness  MSK: painful swelling over medial left ankle Constitutional: no fever, no chills  Head: NC, AT   Eyes: no redness   ENMT: no nasal congestion/drainage, no sore throat   CV: no chest pain, no edema  Resp: no cough, no dyspnea  GI: +abdominal pain, +nausea, +vomiting, no diarrhea  : no dysuria, no hematuria   Skin: no lesions, no rashes   Neuro: no LOC, no headache, no sensory deficits, no weakness

## 2022-07-21 NOTE — H&P ADULT - MUSCULOSKELETAL
no calf tenderness/strength 5/5 bilateral upper extremities/strength 5/5 bilateral lower extremities

## 2022-07-21 NOTE — ED PROVIDER NOTE - NSICDXPASTMEDICALHX_GEN_ALL_CORE_FT
PAST MEDICAL HISTORY:  Other chronic pancreatitis      PAST MEDICAL HISTORY:  Autoimmune hepatitis     Other chronic pancreatitis     Stage 3 chronic kidney disease

## 2022-07-21 NOTE — PATIENT PROFILE ADULT - FALL HARM RISK - UNIVERSAL INTERVENTIONS
Bed in lowest position, wheels locked, appropriate side rails in place/Call bell, personal items and telephone in reach/Instruct patient to call for assistance before getting out of bed or chair/Non-slip footwear when patient is out of bed/[place] to call system/Physically safe environment - no spills, clutter or unnecessary equipment/Purposeful Proactive Rounding/Room/bathroom lighting operational, light cord in reach

## 2022-07-21 NOTE — ED PROVIDER NOTE - OBJECTIVE STATEMENT
46 y f  with pmh of DM with sugars in the 200-300s, presenting for BL foot swelling, worse on left. Swelling started 1.5 weeks ago. Over the last two days she developed a 2 inch blister that burst. Warmth, pain, and redness spread over ankle. Does not have podiatrist. DM is poorly controlled with insulin. Denies f/c, n/v, ab pain, sob, cp. 46 y f  with pmh chronic pancreatitis, autoimmune hepatitis, and CKD stage 3 presenting for diffuse abdominal pain. Pain started 4 days ago with nbnb n/v, decrease ability to tolerate PO, and decrease urine output. Denies diarrhea, passing gas, f/c. Does not know why she has pancreatitis. Denies alcohol, gallstones, ho high triglycerides.

## 2022-07-21 NOTE — CONSULT NOTE ADULT - ASSESSMENT
N/V/ abdominal pin with trivially elevated lipase and no CT evidence of acute or chronic pancreatitis. May be secondary to recent Hydrocortisone therapy +/or viral gastroenteritis. Her symtoms have presently resolved. Would try clear liquids and advance as tolerated. IV Pantoprazole for GI mucosal cytoprotection. Consider Renal consult for CKD. Repeat labs ordered for the AM.

## 2022-07-21 NOTE — ED PROVIDER NOTE - ATTENDING CONTRIBUTION TO CARE
Abdominal pain and vomiting of unclear etiology perhaps exacerbation of chronic pancreatitis, labs remarkable for PAZ with creatinine from 1->4. Has nonhemolyzed K of 5.5 no ecg changes, will trend after fluids, admit on tele for further management.

## 2022-07-21 NOTE — PATIENT PROFILE ADULT - FUNCTIONAL ASSESSMENT - BASIC MOBILITY 6.
4-calculated by average/Not able to assess (calculate score using [facility] averaging method)

## 2022-07-21 NOTE — H&P ADULT - HISTORY OF PRESENT ILLNESS
Patient is a 46 year old female with PMH of Autoimmune Hepatitis on chronic steroids, Chronic Pancreatitis, Hypothyroidism, HLD, CKD Stage 3 (baseline creatinine 1.5 in Feb 2022) who presented to the ED with complaints of nausea/vomiting and worsening abdominal pain. Patient reports that she recently stopped her budesonide about 1 week ago and started hydrocortisone. However, a few days after switching she developed nausea/vomiting and epigastric abdominal pain. States she has been unable to keep anything down and has been having bilious vomiting several times a day for the past 5 days. Vomiting is associated with generalized abdominal pain. Denies fevers, chills or diarrhea. In the ED, labs remarkable for a creatinine of 4.2. potassium of 5.5, WBC count of 15 and lipase of 73. Patient was admitted for further management. Patient currently complaining of severe abdominal pain and nausea. Denies dizziness, lightheadedness, chest pain, SOB, dysuria, blurry vision.

## 2022-07-21 NOTE — CONSULT NOTE ADULT - SUBJECTIVE AND OBJECTIVE BOX
Patient is a 46y old  Female who presents with a chief complaint of N/V, abdominal pain (2022 05:53)      HPI:  Patient is a 46 year old female with PMH of Autoimmune Hepatitis on chronic steroids, Chronic Pancreatitis, Hypothyroidism, HLD, CKD Stage 3 (baseline creatinine 1.5 in 2022) who presented to the ED with complaints of nausea/vomiting and worsening abdominal pain. Patient reports that she recently stopped her budesonide about 1 week ago and started hydrocortisone. However, a few days after switching she developed nausea/vomiting and epigastric abdominal pain. States she has been unable to keep anything down and has been having bilious vomiting several times a day for the past 5 days. Vomiting is associated with generalized abdominal pain. Denies fevers, chills or diarrhea. In the ED, labs remarkable for a creatinine of 4.2. potassium of 5.5, WBC count of 15 and lipase of 73. Patient was admitted for further management. Patient currently complaining of severe abdominal pain and nausea. Denies dizziness, lightheadedness, chest pain, SOB, dysuria, blurry vision. She has a h/o pancreatitis since age 9 of unknown etiology and does have chronic pancreatitis. CT here negative for pancreatitis. She has a h/o autoimmune hepatitis since 2016 and has been on Prednisone since. She was tried on Imuran but it caused her to have pancreatitis and recently was started on Hydrocortisone to take the place of Prednisone but soon thereafter developed N/V and abdominal pain. She is followed for the above by a Hepatologist who works out of [facility]. She states that she is feeling much better this AM and is asymptomatic.      REVIEW OF SYSTEMS:  Constitutional: No fever, weight loss or fatigue  ENMT:  No difficulty hearing, tinnitus, vertigo; No sinus or throat pain  Respiratory: No cough, wheezing, chills or hemoptysis  Cardiovascular: No chest pain, palpitations, dizziness or leg swelling  Gastrointestinal: As per HPI. No diarrhea or constipation. No melena or hematochezia.  Skin: No itching, burning, rashes or lesions   Musculoskeletal: No joint pain or swelling; No muscle, back or extremity pain  Patient has no cardiopulmonary, peripheral vascular, musculoskeletal, dermatological, neurological, gynecological or psychological symptoms or complaints at this time.    PAST MEDICAL & SURGICAL HISTORY:  Other chronic pancreatitis      Autoimmune hepatitis      Stage 3 chronic kidney disease      Ovarian cyst      Hypothyroidism      HLD (hyperlipidemia)      Osteopenia      H/O hernia repair      S/P     S/P Lap Lynette      FAMILY HISTORY:  FH: diabetes mellitus (Father)        SOCIAL HISTORY:  Smoking Status: [ ] Current, [ ] Former, [ x] Never  Pack Years: N/A. No ETOH or drug abuse history.    MEDICATIONS:  MEDICATIONS  (STANDING):  dextrose 5%. 1000 milliLiter(s) (100 mL/Hr) IV Continuous <Continuous>  dextrose 5%. 1000 milliLiter(s) (50 mL/Hr) IV Continuous <Continuous>  dextrose 50% Injectable 25 Gram(s) IV Push once  dextrose 50% Injectable 12.5 Gram(s) IV Push once  dextrose 50% Injectable 25 Gram(s) IV Push once  glucagon  Injectable 1 milliGRAM(s) IntraMuscular once  heparin   Injectable 5000 Unit(s) SubCutaneous every 8 hours  hydrocortisone sodium succinate Injectable 5 milliGRAM(s) IV Push daily  levothyroxine Injectable 50 MICROGram(s) IV Push at bedtime  pantoprazole  Injectable 40 milliGRAM(s) IV Push daily  sodium chloride 0.9%. 1000 milliLiter(s) (100 mL/Hr) IV Continuous <Continuous>    MEDICATIONS  (PRN):  acetaminophen     Tablet .. 650 milliGRAM(s) Oral every 6 hours PRN Temp greater or equal to 38C (100.4F), Mild Pain (1 - 3)  dextrose Oral Gel 15 Gram(s) Oral once PRN Blood Glucose LESS THAN 70 milliGRAM(s)/deciliter  HYDROmorphone  Injectable 1 milliGRAM(s) IV Push every 4 hours PRN Severe Pain (7 - 10)  HYDROmorphone  Injectable 0.5 milliGRAM(s) IV Push every 4 hours PRN Moderate Pain (4 - 6)  ondansetron Injectable 4 milliGRAM(s) IV Push every 8 hours PRN Nausea and/or Vomiting      Allergies    Biaxin (Vomiting)    Intolerances        Vital Signs Last 24 Hrs  T(C): 36.4 (2022 04:50), Max: 36.8 (2022 20:05)  T(F): 97.5 (2022 04:50), Max: 98.3 (2022 20:05)  HR: 71 (2022 04:50) (66 - 83)  BP: 119/73 (2022 04:50) (111/72 - 132/84)  BP(mean): --  RR: 20 (2022 04:50) (16 - 20)  SpO2: 99% (2022 04:50) (98% - 99%)    Parameters below as of 2022 04:50  Patient On (Oxygen Delivery Method): room air            PHYSICAL EXAM:    General: Well developed; in no acute distress  HEENT: MMM, conjunctiva pink and sclera anicteric.  Lungs: Clear bilaterally.  Cor: RRR S1/ S2 only  Gastrointestinal: Soft, non-tender non-distended; Normal bowel sounds; No rebound or guarding or HSM.  IVONNE: Not done. Not presently indicated.  Extremities: Normal range of motion, No clubbing, cyanosis or edema  Neurological: Alert and oriented x3  Skin: Warm and dry. No obvious rash      LABS:                        10.7   11.64 )-----------( 521      ( 2022 05:52 )             32.8     07-21    129<L>  |  94<L>  |  33.7<H>  ----------------------------<  88  4.9   |  23.0  |  3.18<H>    Ca    8.7      2022 05:52  Phos  4.7     -  Mg     2.0         TPro  6.2<L>  /  Alb  3.6  /  TBili  0.8  /  DBili  x   /  AST  18  /  ALT  15  /  AlkPhos  58  07-          RADIOLOGY & ADDITIONAL STUDIES:   < from: CT Abdomen and Pelvis No Cont (22 @ 03:13) >  :  CT ABDOMEN AND PELVIS                          PROCEDURE DATE:  2022          INTERPRETATION:  CLINICAL INDICATION: n/v pancreatitis    TECHNIQUE:  CT abdomen and pelvis without intravenous contrast.  Intravenous contrast: None.      COMPARISON: Sound abdomen dated 2021    INTERPRETATION:  Evaluation of the abdominal viscera is limited without intravenous   contrast.    LUNG BASES/PLEURA: Within normal limits.  HEART: The heart is normal in size.    LIVER: Within normal limits.  BILIARY SYSTEM: There is no dilatation of the common bile duct.  GALLBLADDER: Status post cholecystectomy.    PANCREAS: Within normal limits.  SPLEEN: Within normal limits.  ADRENALS: Within normal limits.    KIDNEYS/URETERS: No hydronephrosis or stones.    BOWEL/MESENTERY: No bowel obstruction or wall thickening. The appendix is   normal.    URINARY BLADDER: Unremarkable.  REPRODUCTIVE ORGANS: A 2.9 cm left adnexal low-attenuation lesion is   suggestive of an ovarian cyst orcystic lesion..    PERITONEUM/RETROPERITONEUM: No free air. No free or loculated fluid.  LYMPH NODES: No abdominal or pelvic lymphadenopathy.  VESSELS: No abdominal aortic aneurysm.    BONES: Within normal limits.  SOFT TISSUES: Within normal limits.    TUBES AND LINES: None.    IMPRESSION:  No CT evidence of pancreatitis. Serologic assessment is more sensitive in   detection of early  A 2.9 cm left adnexal low-attenuation lesion is suggestive of an ovarian   cyst or cystic lesion.  Status post cholecystectomy.    --- End of Report ---            MOE SON MD; Attending Radiologist  This document has been electronically signed. 2022  3:40AM    < end of copied text >

## 2022-07-21 NOTE — H&P ADULT - ASSESSMENT
Patient is a 46 year old female with PMH of Autoimmune Hepatitis on chronic steroids, Chronic Pancreatitis, Ovarian Cyst, Hypothyroidism, HLD, CKD Stage 3 (baseline creatinine 1.5 in Feb 2022) who presented to the ED with complaints of nausea/vomiting and worsening abdominal pain.    1. Nausea/vomiting possibly due to chronic pancreatitis vs uremia  -zofran as needed  -NPO  -IV hydration  -IV Protonix  -Monitor lytes  -Check UDS   -GI consult requested    2. Abdominal pain likely due to chronic pancreatitis   -patient with pain that originates in the epigastric region   -elevated lipase of 73  -CT abdomen negative for acute inflammation  -analgesia as needed  -trial of IV PPI  -GI consult as above    3. History of Autoimmune Hepatitis  -switch hydrocortisone to IV  -LFTS WNL  -follows with hepatology as outpatient in [place]   -GI consult as above    4. PAZ on CKD Stage 3 due to prerenal azotemia due to intravascular volume depletion   -baseline creatinine 1.5 in Feb 2022 (per patient, follows Dr. Amor in [place])  -creatinine 4.2 on arrival  -only took 1 Ibuprofen recently  -check UA, urine lytes  -IV hydration  -no hydro on CT  -strict I/Os, daily weights  -avoid nephrotoxic agents and renally dose medications  -repeat BMP to monitor for renal recovery        DVT ppx - Heparin SC Patient is a 46 year old female with PMH of Autoimmune Hepatitis on chronic steroids, Chronic Pancreatitis, Ovarian Cyst, Hypothyroidism, HLD, CKD Stage 3 (baseline creatinine 1.5 in Feb 2022) who presented to the ED with complaints of nausea/vomiting and worsening abdominal pain.    1. Nausea/vomiting possibly due to chronic pancreatitis vs uremia  -zofran as needed (continue qtc)  -NPO  -IV hydration  -IV Protonix  -Monitor lytes  -Check UDS   -GI consult requested    2. Abdominal pain likely due to chronic pancreatitis   -patient with pain that originates in the epigastric region   -elevated lipase of 73  -CT abdomen negative for acute inflammation  -analgesia as needed  -trial of IV PPI  -GI consult as above    3. PAZ on CKD Stage 3 due to prerenal azotemia due to intravascular volume depletion   -baseline creatinine 1.5 in Feb 2022 (per patient, follows Dr. Amor in [place])  -creatinine 4.2 on arrival  -only took 1 Ibuprofen recently  -check UA, urine lytes  -IV hydration  -no hydro on CT  -strict I/Os, daily weights  -avoid nephrotoxic agents and renally dose medications  -repeat BMP to monitor for renal recovery    4. History of Autoimmune Hepatitis  -switch hydrocortisone to IV  -LFTS WNL  -follows with hepatology as outpatient in [place]   -GI consult as above    5. Hyperkalemia due to volume depletion and renal insufficiency   -K 5.5  -patient received LR in the ED  -check EKG for peaked T-waves  -unable to tolerate PO therefore defer Lokelma  -if peaked T waves then will medically manage, otherwise will repeat BMP stat to follow up repeat K    6. Leukocytosis, likely reactive  -denies fevers/chills  -check procal  -check UA  -no intra-abdominal source of infection on CT abdomen  -monitor WBC and defer abx unless febrile    7.  Hypothyroidism  -check TSH  -IV synthroid while NPO    8.  History of HLD  -check lipid panel   -not on statin    9. History of Ovarian cyst  -seen on CT abdomen  -outpatient surveillance imaging and follow up with GYN discussed     DVT ppx - Heparin SC Patient is a 46 year old female with PMH of Autoimmune Hepatitis on chronic steroids, Chronic Pancreatitis, Ovarian Cyst, Hypothyroidism, HLD, CKD Stage 3 (baseline creatinine 1.5 in Feb 2022) who presented to the ED with complaints of nausea/vomiting and worsening abdominal pain.    1. Nausea/vomiting possibly due to chronic pancreatitis vs uremia  -zofran as needed (check EKG to assess qtc)  -NPO  -IV hydration  -IV Protonix  -Monitor lytes  -Check UDS   -GI consult requested    2. Abdominal pain likely due to chronic pancreatitis   -patient with pain that originates in the epigastric region   -elevated lipase of 73  -CT abdomen negative for acute inflammation  -analgesia as needed  -trial of IV PPI  -GI consult as above    3. PAZ on CKD Stage 3 due to prerenal azotemia due to intravascular volume depletion   -baseline creatinine 1.5 in Feb 2022 (per patient, follows Dr. Amor in [place])  -creatinine 4.2 on arrival  -only took 1 Ibuprofen recently  -check UA, urine lytes  -IV hydration  -no hydro on CT  -strict I/Os, daily weights  -avoid nephrotoxic agents and renally dose medications  -repeat BMP to monitor for renal recovery    4. History of Autoimmune Hepatitis  -switch hydrocortisone to IV  -LFTS WNL  -follows with hepatology as outpatient in [place]   -GI consult as above    5. Hyperkalemia due to volume depletion and renal insufficiency   -K 5.5 and then received LR in the ED  -check EKG   -if peaked T waves then will medically manage   -unable to tolerate PO therefore defer Lokelma  -f/u repeat BMP     6. Leukocytosis, likely reactive  -denies fevers/chills  -check procal  -check UA  -no intra-abdominal source of infection on CT abdomen  -monitor WBC and defer abx unless febrile    7.  Hypothyroidism  -check TSH  -IV synthroid while NPO    8.  History of HLD  -check lipid panel   -not on statin    9. History of Ovarian cyst  -seen on CT abdomen  -outpatient surveillance imaging and follow up with GYN discussed    10. Mild Hyponatremia due to hypovolemia  -repeat BMP after volume resuscitation  -avoid hypotonic fluids  -monitor I/Os  -if no improvement in serum sodium, will check urine studies     DVT ppx - Heparin SC Patient is a 46 year old female with PMH of Autoimmune Hepatitis on chronic steroids, Chronic Pancreatitis, Ovarian Cyst, Hypothyroidism, HLD, CKD Stage 3 (baseline creatinine 1.5 in Feb 2022) who presented to the ED with complaints of nausea/vomiting and worsening abdominal pain.    1. Nausea/vomiting possibly due to chronic pancreatitis vs uremia  -zofran as needed (check EKG to assess qtc)  -NPO  -IV hydration  -IV Protonix  -Monitor lytes  -Check UDS   -GI consult requested    2. Abdominal pain likely due to chronic pancreatitis   -patient with pain that originates in the epigastric region   -elevated lipase of 73  -CT abdomen negative for acute inflammation  -analgesia as needed  -trial of IV PPI  -GI consult as above    3. PAZ on CKD Stage 3 due to prerenal azotemia due to intravascular volume depletion   -baseline creatinine 1.5 in Feb 2022 (per patient, follows Dr. Amor in [place])  -creatinine 4.2 on arrival  -only took 1 Ibuprofen recently  -check UA, urine lytes  -IV hydration  -no hydro on CT  -strict I/Os, daily weights  -avoid nephrotoxic agents and renally dose medications  -repeat BMP to monitor for renal recovery    4. History of Autoimmune Hepatitis  -switch hydrocortisone to IV  -LFTS WNL  -follows with hepatology as outpatient in [place]   -GI consult as above    5. Hyperkalemia due to volume depletion and renal insufficiency   -K 5.5 and then received LR in the ED  -check EKG   -unable to tolerate PO therefore defer Lokelma  -f/u repeat BMP and medically manage if potassium is still elevated or patient has EKG changes    6. Leukocytosis, likely reactive  -denies fevers/chills  -check procal  -check UA  -no intra-abdominal source of infection on CT abdomen  -monitor WBC and defer abx unless febrile    7.  Hypothyroidism  -check TSH  -IV synthroid while NPO    8.  History of HLD  -check lipid panel   -not on statin    9. History of Ovarian cyst  -seen on CT abdomen  -outpatient surveillance imaging and follow up with GYN discussed    10. Mild Hyponatremia due to hypovolemia  -repeat BMP after volume resuscitation  -avoid hypotonic fluids  -monitor I/Os  -if no improvement in serum sodium, will check urine studies     DVT ppx - Heparin SC Patient is a 46 year old female with PMH of Autoimmune Hepatitis on chronic steroids, Chronic Pancreatitis, Ovarian Cyst, Hypothyroidism, HLD, CKD Stage 3 (baseline creatinine 1.5 in Feb 2022) who presented to the ED with complaints of nausea/vomiting and worsening abdominal pain.    1. Nausea/vomiting possibly due to chronic pancreatitis vs uremia  -zofran as needed (check EKG to assess qtc)  -NPO  -IV hydration  -IV Protonix  -Monitor lytes  -Check UDS   -GI consult requested    2. Abdominal pain likely due to chronic pancreatitis   -patient with pain that originates in the epigastric region   -elevated lipase of 73  -CT abdomen negative for acute inflammation  -analgesia as needed  -trial of IV PPI  -GI consult as above    3. PAZ on CKD Stage 3 due to prerenal azotemia due to intravascular volume depletion   -baseline creatinine 1.5 in Feb 2022 (per patient, follows Dr. Amor in [place])  -creatinine 4.2 on arrival and improved to 3.18 with IV hydration  -not on chronic NSAIDs  -check UA, urine lytes  -continue IV hydration  -no hydro on CT  -strict I/Os, daily weights  -avoid nephrotoxic agents and renally dose medications  -repeat BMP to monitor for renal recovery    4. History of Autoimmune Hepatitis  -switch hydrocortisone to IV  -LFTS WNL  -follows with hepatology as outpatient in [place]   -GI consult as above    5. Hyperkalemia due to volume depletion and renal insufficiency   -K 5.5 --> 4.9 s/p IV hydration  -check EKG   -unable to tolerate PO therefore defer Lokelma    6. Leukocytosis, likely reactive  -WBC downtrending to 11 from 15  -denies fevers/chills  -check procal  -check UA  -no intra-abdominal source of infection on CT abdomen  -monitor WBC and defer abx unless febrile    7.  Hypothyroidism  -check TSH  -IV synthroid while NPO    8.  History of HLD  -check lipid panel   -not on statin    9. History of Ovarian cyst  -seen on CT abdomen  -outpatient surveillance imaging and follow up with GYN discussed    10. Mild Hyponatremia due to hypovolemia  -repeat BMP after hypotonic fluids 129  -no further hypotonic fluids including LR  -start trial of NS  -check urine studies  -monitor I/Os  -repeat BMP in 4 hours     DVT ppx - Heparin SC

## 2022-07-22 ENCOUNTER — TRANSCRIPTION ENCOUNTER (OUTPATIENT)
Age: 47
End: 2022-07-22

## 2022-07-22 VITALS
RESPIRATION RATE: 17 BRPM | SYSTOLIC BLOOD PRESSURE: 105 MMHG | TEMPERATURE: 98 F | HEART RATE: 65 BPM | DIASTOLIC BLOOD PRESSURE: 64 MMHG | OXYGEN SATURATION: 99 %

## 2022-07-22 LAB
A1C WITH ESTIMATED AVERAGE GLUCOSE RESULT: 5 % — SIGNIFICANT CHANGE UP (ref 4–5.6)
ANION GAP SERPL CALC-SCNC: 11 MMOL/L — SIGNIFICANT CHANGE UP (ref 5–17)
BASOPHILS # BLD AUTO: 0.02 K/UL — SIGNIFICANT CHANGE UP (ref 0–0.2)
BASOPHILS NFR BLD AUTO: 0.2 % — SIGNIFICANT CHANGE UP (ref 0–2)
BUN SERPL-MCNC: 12 MG/DL — SIGNIFICANT CHANGE UP (ref 8–20)
CALCIUM SERPL-MCNC: 8.2 MG/DL — LOW (ref 8.6–10.2)
CHLORIDE SERPL-SCNC: 106 MMOL/L — SIGNIFICANT CHANGE UP (ref 98–107)
CHOLEST SERPL-MCNC: 180 MG/DL — SIGNIFICANT CHANGE UP
CO2 SERPL-SCNC: 22 MMOL/L — SIGNIFICANT CHANGE UP (ref 22–29)
CREAT SERPL-MCNC: 1.43 MG/DL — HIGH (ref 0.5–1.3)
EGFR: 46 ML/MIN/1.73M2 — LOW
EOSINOPHIL # BLD AUTO: 0.04 K/UL — SIGNIFICANT CHANGE UP (ref 0–0.5)
EOSINOPHIL NFR BLD AUTO: 0.4 % — SIGNIFICANT CHANGE UP (ref 0–6)
ESTIMATED AVERAGE GLUCOSE: 97 MG/DL — SIGNIFICANT CHANGE UP (ref 68–114)
GLUCOSE SERPL-MCNC: 77 MG/DL — SIGNIFICANT CHANGE UP (ref 70–99)
HCT VFR BLD CALC: 30 % — LOW (ref 34.5–45)
HDLC SERPL-MCNC: 49 MG/DL — LOW
HGB BLD-MCNC: 9.8 G/DL — LOW (ref 11.5–15.5)
IMM GRANULOCYTES NFR BLD AUTO: 0.4 % — SIGNIFICANT CHANGE UP (ref 0–1.5)
LIPID PNL WITH DIRECT LDL SERPL: 103 MG/DL — HIGH
LYMPHOCYTES # BLD AUTO: 4.53 K/UL — HIGH (ref 1–3.3)
LYMPHOCYTES # BLD AUTO: 43.9 % — SIGNIFICANT CHANGE UP (ref 13–44)
MAGNESIUM SERPL-MCNC: 1.8 MG/DL — SIGNIFICANT CHANGE UP (ref 1.6–2.6)
MCHC RBC-ENTMCNC: 28.8 PG — SIGNIFICANT CHANGE UP (ref 27–34)
MCHC RBC-ENTMCNC: 32.7 GM/DL — SIGNIFICANT CHANGE UP (ref 32–36)
MCV RBC AUTO: 88.2 FL — SIGNIFICANT CHANGE UP (ref 80–100)
MONOCYTES # BLD AUTO: 0.6 K/UL — SIGNIFICANT CHANGE UP (ref 0–0.9)
MONOCYTES NFR BLD AUTO: 5.8 % — SIGNIFICANT CHANGE UP (ref 2–14)
NEUTROPHILS # BLD AUTO: 5.08 K/UL — SIGNIFICANT CHANGE UP (ref 1.8–7.4)
NEUTROPHILS NFR BLD AUTO: 49.3 % — SIGNIFICANT CHANGE UP (ref 43–77)
NON HDL CHOLESTEROL: 131 MG/DL — HIGH
PHOSPHATE SERPL-MCNC: 2.9 MG/DL — SIGNIFICANT CHANGE UP (ref 2.4–4.7)
PLATELET # BLD AUTO: 408 K/UL — HIGH (ref 150–400)
POTASSIUM SERPL-MCNC: 5 MMOL/L — SIGNIFICANT CHANGE UP (ref 3.5–5.3)
POTASSIUM SERPL-SCNC: 5 MMOL/L — SIGNIFICANT CHANGE UP (ref 3.5–5.3)
RBC # BLD: 3.4 M/UL — LOW (ref 3.8–5.2)
RBC # FLD: 14.6 % — HIGH (ref 10.3–14.5)
SODIUM SERPL-SCNC: 138 MMOL/L — SIGNIFICANT CHANGE UP (ref 135–145)
TRIGL SERPL-MCNC: 140 MG/DL — SIGNIFICANT CHANGE UP
TSH SERPL-MCNC: 1.81 UIU/ML — SIGNIFICANT CHANGE UP (ref 0.27–4.2)
WBC # BLD: 10.31 K/UL — SIGNIFICANT CHANGE UP (ref 3.8–10.5)
WBC # FLD AUTO: 10.31 K/UL — SIGNIFICANT CHANGE UP (ref 3.8–10.5)

## 2022-07-22 PROCEDURE — 85025 COMPLETE CBC W/AUTO DIFF WBC: CPT

## 2022-07-22 PROCEDURE — 83036 HEMOGLOBIN GLYCOSYLATED A1C: CPT

## 2022-07-22 PROCEDURE — 74176 CT ABD & PELVIS W/O CONTRAST: CPT | Mod: MA

## 2022-07-22 PROCEDURE — 93005 ELECTROCARDIOGRAM TRACING: CPT

## 2022-07-22 PROCEDURE — U0005: CPT

## 2022-07-22 PROCEDURE — 36415 COLL VENOUS BLD VENIPUNCTURE: CPT

## 2022-07-22 PROCEDURE — 83690 ASSAY OF LIPASE: CPT

## 2022-07-22 PROCEDURE — 83735 ASSAY OF MAGNESIUM: CPT

## 2022-07-22 PROCEDURE — 80061 LIPID PANEL: CPT

## 2022-07-22 PROCEDURE — 96375 TX/PRO/DX INJ NEW DRUG ADDON: CPT

## 2022-07-22 PROCEDURE — U0003: CPT

## 2022-07-22 PROCEDURE — 99285 EMERGENCY DEPT VISIT HI MDM: CPT

## 2022-07-22 PROCEDURE — 96365 THER/PROPH/DIAG IV INF INIT: CPT

## 2022-07-22 PROCEDURE — 80053 COMPREHEN METABOLIC PANEL: CPT

## 2022-07-22 PROCEDURE — 84145 PROCALCITONIN (PCT): CPT

## 2022-07-22 PROCEDURE — 99233 SBSQ HOSP IP/OBS HIGH 50: CPT

## 2022-07-22 PROCEDURE — 81001 URINALYSIS AUTO W/SCOPE: CPT

## 2022-07-22 PROCEDURE — 80048 BASIC METABOLIC PNL TOTAL CA: CPT

## 2022-07-22 PROCEDURE — 99239 HOSP IP/OBS DSCHRG MGMT >30: CPT

## 2022-07-22 PROCEDURE — 84100 ASSAY OF PHOSPHORUS: CPT

## 2022-07-22 PROCEDURE — 84702 CHORIONIC GONADOTROPIN TEST: CPT

## 2022-07-22 PROCEDURE — 87086 URINE CULTURE/COLONY COUNT: CPT

## 2022-07-22 PROCEDURE — 84443 ASSAY THYROID STIM HORMONE: CPT

## 2022-07-22 PROCEDURE — 85027 COMPLETE CBC AUTOMATED: CPT

## 2022-07-22 RX ORDER — SODIUM CHLORIDE 9 MG/ML
1000 INJECTION INTRAMUSCULAR; INTRAVENOUS; SUBCUTANEOUS
Refills: 0 | Status: DISCONTINUED | OUTPATIENT
Start: 2022-07-22 | End: 2022-07-22

## 2022-07-22 RX ADMIN — HEPARIN SODIUM 5000 UNIT(S): 5000 INJECTION INTRAVENOUS; SUBCUTANEOUS at 06:55

## 2022-07-22 RX ADMIN — Medication 5 MILLIGRAM(S): at 06:55

## 2022-07-22 RX ADMIN — PANTOPRAZOLE SODIUM 40 MILLIGRAM(S): 20 TABLET, DELAYED RELEASE ORAL at 12:07

## 2022-07-22 NOTE — DISCHARGE NOTE PROVIDER - NSDCMRMEDTOKEN_GEN_ALL_CORE_FT
hydrocortisone 5 mg oral tablet: 1 tab(s) orally once a day  Synthroid 100 mcg (0.1 mg) oral tablet: 1 tab(s) orally once a day

## 2022-07-22 NOTE — PROGRESS NOTE ADULT - ASSESSMENT
46 yr old female with autoimmune hepatitis on steroids, chronic pancreatitis, hypothyroidism, hyperlipidemia, CKD stage 3 presented with complaints of nausea, vomiting, abdominal pain. She was changed regimen from Budesonide to hydrocortisone, shortly after started to have these symptoms. Labs on admission, revealed PAZ on CKD, hyperkalemia, hyponatremia, mild elevated lipase and leucocytosis. CT abdomen without acute pathology, a left ovarian cyst. IVF was initiated, she was placed on a clear liquid diet. GI was consulted for further evaluation.   Abdominal pain, N/V now resolved and was likely secondary to viral gastroenteritis vs. Recent hydrocortisone therapy.   Will advance to regular diet and if tolerates can be D/c home from a GI standpoint.   F/U outpatient with her Hepatologist in [place]

## 2022-07-22 NOTE — DISCHARGE NOTE PROVIDER - NSDCCPCAREPLAN_GEN_ALL_CORE_FT
PRINCIPAL DISCHARGE DIAGNOSIS  Diagnosis: PAZ (acute kidney injury)  Assessment and Plan of Treatment: sec dehydration  now at baseline  follow up with PMD      SECONDARY DISCHARGE DIAGNOSES  Diagnosis: Autoimmune hepatitis  Assessment and Plan of Treatment: continue home medications    Diagnosis: Viral gastroenteritis  Assessment and Plan of Treatment: resolved

## 2022-07-22 NOTE — PROGRESS NOTE ADULT - SUBJECTIVE AND OBJECTIVE BOX
Chief Complaint:  Patient is a 46y old  Female who presents with a chief complaint of N/V, abdominal pain (2022 07:42)      Interval Events / Subjective: Patient seen and evaluated at bedside, reporting no complaints, no overnight events. Patient reports improvement in symptoms. Tolerating clear liquid diet. Denies nausea, vomiting, abdominal pain, chest pain, shortness of breath, hematemesis, hematochezia, melena.       REVIEW OF SYSTEMS:   General: Negative  HEENT: Negative  CV: Negative  Respiratory: Negative  GI: See HPI  : Negative  MSK: Negative  Hematologic: Negative  Skin: Negative    MEDICATIONS:   MEDICATIONS  (STANDING):  dextrose 5%. 1000 milliLiter(s) (100 mL/Hr) IV Continuous <Continuous>  dextrose 5%. 1000 milliLiter(s) (50 mL/Hr) IV Continuous <Continuous>  dextrose 50% Injectable 25 Gram(s) IV Push once  dextrose 50% Injectable 12.5 Gram(s) IV Push once  dextrose 50% Injectable 25 Gram(s) IV Push once  glucagon  Injectable 1 milliGRAM(s) IntraMuscular once  heparin   Injectable 5000 Unit(s) SubCutaneous every 8 hours  hydrocortisone sodium succinate Injectable 5 milliGRAM(s) IV Push daily  levothyroxine Injectable 50 MICROGram(s) IV Push at bedtime  pantoprazole  Injectable 40 milliGRAM(s) IV Push daily    MEDICATIONS  (PRN):  acetaminophen     Tablet .. 650 milliGRAM(s) Oral every 6 hours PRN Temp greater or equal to 38C (100.4F), Mild Pain (1 - 3)  dextrose Oral Gel 15 Gram(s) Oral once PRN Blood Glucose LESS THAN 70 milliGRAM(s)/deciliter  HYDROmorphone  Injectable 1 milliGRAM(s) IV Push every 4 hours PRN Severe Pain (7 - 10)  HYDROmorphone  Injectable 0.5 milliGRAM(s) IV Push every 4 hours PRN Moderate Pain (4 - 6)  ondansetron Injectable 4 milliGRAM(s) IV Push every 8 hours PRN Nausea and/or Vomiting      ALLERGIES:   Allergies    Biaxin (Vomiting)    Intolerances        VITAL SIGNS:   Vital Signs Last 24 Hrs  T(C): 36.9 (2022 07:35), Max: 37.1 (2022 19:31)  T(F): 98.4 (2022 07:35), Max: 98.8 (2022 19:31)  HR: 62 (2022 07:35) (58 - 72)  BP: 99/63 (2022 07:35) (99/63 - 116/73)  BP(mean): --  RR: 17 (2022 07:35) (16 - 18)  SpO2: 100% (2022 07:35) (98% - 100%)    Parameters below as of 2022 07:35  Patient On (Oxygen Delivery Method): room air      I&O's Summary    2022 07:01  -  2022 07:00  --------------------------------------------------------  IN: 0 mL / OUT: 1450 mL / NET: -1450 mL        PHYSICAL EXAM:   GENERAL:  No acute distress  HEENT:  NC/AT,  conjunctivae clear, sclera -anicteric  CHEST:  Full & symmetric excursion, no increased effort, breath sounds clear  HEART:  Regular rhythm, S1, S2, no murmur/rub/S3/S4,  no edema  ABDOMEN:  Soft, non-tender, non-distended, normoactive bowel sounds   EXTREMITIES: No cyanosis, clubbing or edema  SKIN:  No rash/erythema/ecchymoses/petechiae/wounds/abscess/warm/dry  NEURO:  Alert, oriented      LABS:  CBC Full  -  ( 2022 07:25 )  WBC Count : 10.31 K/uL  RBC Count : 3.40 M/uL  Hemoglobin : 9.8 g/dL  Hematocrit : 30.0 %  Platelet Count - Automated : 408 K/uL  Mean Cell Volume : 88.2 fl  Mean Cell Hemoglobin : 28.8 pg  Mean Cell Hemoglobin Concentration : 32.7 gm/dL  Auto Neutrophil # : 5.08 K/uL  Auto Lymphocyte # : 4.53 K/uL  Auto Monocyte # : 0.60 K/uL  Auto Eosinophil # : 0.04 K/uL  Auto Basophil # : 0.02 K/uL  Auto Neutrophil % : 49.3 %  Auto Lymphocyte % : 43.9 %  Auto Monocyte % : 5.8 %  Auto Eosinophil % : 0.4 %  Auto Basophil % : 0.2 %        131<L>  |  100  |  26.1<H>  ----------------------------<  79  4.5   |  19.0<L>  |  2.25<H>    Ca    7.9<L>      2022 11:20  Phos  4.7       Mg     2.0         TPro  6.2<L>  /  Alb  3.6  /  TBili  0.8  /  DBili  x   /  AST  18  /  ALT  15  /  AlkPhos  58      LIVER FUNCTIONS - ( 2022 05:52 )  Alb: 3.6 g/dL / Pro: 6.2 g/dL / ALK PHOS: 58 U/L / ALT: 15 U/L / AST: 18 U/L / GGT: x             Urinalysis Basic - ( 2022 05:28 )    Color: Yellow / Appearance: Clear / S.020 / pH: x  Gluc: x / Ketone: Trace  / Bili: Negative / Urobili: Negative mg/dL   Blood: x / Protein: Negative / Nitrite: Negative   Leuk Esterase: Negative / RBC: 3-5 /HPF / WBC 6-10 /HPF   Sq Epi: x / Non Sq Epi: Moderate / Bacteria: Few          RADIOLOGY & ADDITIONAL STUDIES (The following images were personally reviewed):    < from: CT Abdomen and Pelvis No Cont (22 @ 03:13) >    ACC: [id number] EXAM:  CT ABDOMEN AND PELVIS                          PROCEDURE DATE:  2022          INTERPRETATION:  CLINICAL INDICATION: n/v pancreatitis    TECHNIQUE:  CT abdomen and pelvis without intravenous contrast.  Intravenous contrast: None.      COMPARISON: Sound abdomen dated 2021    INTERPRETATION:  Evaluation of the abdominal viscera is limited without intravenous   contrast.    LUNG BASES/PLEURA: Within normal limits.  HEART: The heart is normal in size.    LIVER: Within normal limits.  BILIARY SYSTEM: There is no dilatation of the common bile duct.  GALLBLADDER: Status post cholecystectomy.    PANCREAS: Within normal limits.  SPLEEN: Within normal limits.  ADRENALS: Within normal limits.    KIDNEYS/URETERS: No hydronephrosis or stones.    BOWEL/MESENTERY: No bowel obstruction or wall thickening. The appendix is   normal.    URINARY BLADDER: Unremarkable.  REPRODUCTIVE ORGANS: A 2.9 cm left adnexal low-attenuation lesion is   suggestive of an ovarian cyst orcystic lesion..    PERITONEUM/RETROPERITONEUM: No free air. No free or loculated fluid.  LYMPH NODES: No abdominal or pelvic lymphadenopathy.  VESSELS: No abdominal aortic aneurysm.    BONES: Within normal limits.  SOFT TISSUES: Within normal limits.    TUBES AND LINES: None.    IMPRESSION:  No CT evidence of pancreatitis. Serologic assessment is more sensitive in   detection of early  A 2.9 cm left adnexal low-attenuation lesion is suggestive of an ovarian   cyst or cystic lesion.  Status post cholecystectomy.    --- End of Report ---              < end of copied text >  
INTERVAL HPI/OVERNIGHT EVENTS:    CC: PAZ, autoimmune hepatitis, hypothyroidism, hyperlipidemia    No overnight events  denies complaints  no abdominal pain, tolerating clears.    Vital Signs Last 24 Hrs  T(C): 36.8 (2022 11:14), Max: 37.1 (2022 19:31)  T(F): 98.2 (2022 11:14), Max: 98.8 (2022 19:31)  HR: 65 (2022 11:14) (62 - 72)  BP: 105/64 (2022 11:14) (99/63 - 116/73)  BP(mean): --  RR: 17 (2022 11:14) (17 - 18)  SpO2: 99% (2022 11:14) (98% - 100%)    Parameters below as of 2022 11:14  Patient On (Oxygen Delivery Method): room air        PHYSICAL EXAM:    GENERAL: alert, not in distress  CHEST/LUNG: b/l air entry  HEART: reg  ABDOMEN: soft, bs+  EXTREMITIES:  no edema, tenderness    MEDICATIONS  (STANDING):  dextrose 5%. 1000 milliLiter(s) (100 mL/Hr) IV Continuous <Continuous>  dextrose 5%. 1000 milliLiter(s) (50 mL/Hr) IV Continuous <Continuous>  dextrose 50% Injectable 25 Gram(s) IV Push once  dextrose 50% Injectable 12.5 Gram(s) IV Push once  dextrose 50% Injectable 25 Gram(s) IV Push once  glucagon  Injectable 1 milliGRAM(s) IntraMuscular once  heparin   Injectable 5000 Unit(s) SubCutaneous every 8 hours  hydrocortisone sodium succinate Injectable 5 milliGRAM(s) IV Push daily  levothyroxine Injectable 50 MICROGram(s) IV Push at bedtime  pantoprazole  Injectable 40 milliGRAM(s) IV Push daily  sodium chloride 0.9%. 1000 milliLiter(s) (100 mL/Hr) IV Continuous <Continuous>    MEDICATIONS  (PRN):  acetaminophen     Tablet .. 650 milliGRAM(s) Oral every 6 hours PRN Temp greater or equal to 38C (100.4F), Mild Pain (1 - 3)  dextrose Oral Gel 15 Gram(s) Oral once PRN Blood Glucose LESS THAN 70 milliGRAM(s)/deciliter  HYDROmorphone  Injectable 1 milliGRAM(s) IV Push every 4 hours PRN Severe Pain (7 - 10)  HYDROmorphone  Injectable 0.5 milliGRAM(s) IV Push every 4 hours PRN Moderate Pain (4 - 6)  ondansetron Injectable 4 milliGRAM(s) IV Push every 8 hours PRN Nausea and/or Vomiting      Allergies    Biaxin (Vomiting)    Intolerances          LABS:                          9.8    10.31 )-----------( 408      ( 2022 07:25 )             30.0     07    138  |  106  |  12.0  ----------------------------<  77  5.0   |  22.0  |  1.43<H>    Ca    8.2<L>      2022 07:25  Phos  2.9     07  Mg     1.8         TPro  6.2<L>  /  Alb  3.6  /  TBili  0.8  /  DBili  x   /  AST  18  /  ALT  15  /  AlkPhos  58  07-21      Urinalysis Basic - ( 2022 05:28 )    Color: Yellow / Appearance: Clear / S.020 / pH: x  Gluc: x / Ketone: Trace  / Bili: Negative / Urobili: Negative mg/dL   Blood: x / Protein: Negative / Nitrite: Negative   Leuk Esterase: Negative / RBC: 3-5 /HPF / WBC 6-10 /HPF   Sq Epi: x / Non Sq Epi: Moderate / Bacteria: Few        RADIOLOGY & ADDITIONAL TESTS:  
INTERVAL HPI/OVERNIGHT EVENTS:    CC: PAZ, autoimmune hepatitis, hypothyroidism, hyperlipidemia    Chart and course reviewed.  No overnight events  nausea improved.    Vital Signs Last 24 Hrs  T(C): 36.7 (2022 09:58), Max: 36.8 (2022 20:05)  T(F): 98 (2022 09:58), Max: 98.3 (2022 20:05)  HR: 70 (2022 09:58) (58 - 83)  BP: 100/67 (2022 09:58) (100/67 - 132/84)  BP(mean): --  RR: 18 (2022 09:58) (16 - 20)  SpO2: 100% (2022 09:58) (98% - 100%)    Parameters below as of 2022 09:58  Patient On (Oxygen Delivery Method): room air        PHYSICAL EXAM:    GENERAL: alert, not in distress  CHEST/LUNG: b/l air entry  HEART: reg  ABDOMEN: soft, bs+, non tenderness  EXTREMITIES:  no edema, tenderness    MEDICATIONS  (STANDING):  dextrose 5%. 1000 milliLiter(s) (100 mL/Hr) IV Continuous <Continuous>  dextrose 5%. 1000 milliLiter(s) (50 mL/Hr) IV Continuous <Continuous>  dextrose 50% Injectable 25 Gram(s) IV Push once  dextrose 50% Injectable 12.5 Gram(s) IV Push once  dextrose 50% Injectable 25 Gram(s) IV Push once  glucagon  Injectable 1 milliGRAM(s) IntraMuscular once  heparin   Injectable 5000 Unit(s) SubCutaneous every 8 hours  hydrocortisone sodium succinate Injectable 5 milliGRAM(s) IV Push daily  levothyroxine Injectable 50 MICROGram(s) IV Push at bedtime  pantoprazole  Injectable 40 milliGRAM(s) IV Push daily  sodium chloride 0.9%. 1000 milliLiter(s) (100 mL/Hr) IV Continuous <Continuous>    MEDICATIONS  (PRN):  acetaminophen     Tablet .. 650 milliGRAM(s) Oral every 6 hours PRN Temp greater or equal to 38C (100.4F), Mild Pain (1 - 3)  dextrose Oral Gel 15 Gram(s) Oral once PRN Blood Glucose LESS THAN 70 milliGRAM(s)/deciliter  HYDROmorphone  Injectable 1 milliGRAM(s) IV Push every 4 hours PRN Severe Pain (7 - 10)  HYDROmorphone  Injectable 0.5 milliGRAM(s) IV Push every 4 hours PRN Moderate Pain (4 - 6)  ondansetron Injectable 4 milliGRAM(s) IV Push every 8 hours PRN Nausea and/or Vomiting      Allergies    Biaxin (Vomiting)    Intolerances          LABS:                          10.7   11.64 )-----------( 521      ( 2022 05:52 )             32.8     07-    131<L>  |  100  |  26.1<H>  ----------------------------<  79  4.5   |  19.0<L>  |  2.25<H>    Ca    7.9<L>      2022 11:20  Phos  4.7       Mg     2.0         TPro  6.2<L>  /  Alb  3.6  /  TBili  0.8  /  DBili  x   /  AST  18  /  ALT  15  /  AlkPhos  58  -      Urinalysis Basic - ( 2022 05:28 )    Color: Yellow / Appearance: Clear / S.020 / pH: x  Gluc: x / Ketone: Trace  / Bili: Negative / Urobili: Negative mg/dL   Blood: x / Protein: Negative / Nitrite: Negative   Leuk Esterase: Negative / RBC: 3-5 /HPF / WBC 6-10 /HPF   Sq Epi: x / Non Sq Epi: Moderate / Bacteria: Few        RADIOLOGY & ADDITIONAL TESTS:

## 2022-07-22 NOTE — PROGRESS NOTE ADULT - ASSESSMENT
46 yr old female with autoimmune hepatitis on steroids, chronic pancreatitis, hypothyroidism, hyperlipidemia, CKD stage 3 presented with complaints of nausea, vomiting, abdominal pain. She was changed regimen from Budesonide to hydrocortisone, shortly after started to have these symptoms. Labs on admission, revealed PAZ on CKD, hyperkalemia, hyponatremia, mild elevated lipase and leucocytosis. CT abdomen without acute pathology, a left ovarian cyst. IVF was initiated, she was placed on a clear liquid diet. GI was consulted, attributed to viral gastroenteritis. Her renal function improved with IV hydration and improved to baseline. Her diet was advanced.    1. PAZ on CKD:  resolved  now at baseline  advance diet    2. Nausea/vomiting:  Likely viral  GI input noted  Zofran prn  PPI  resolved  diet advanced.    3. Autoimmune hepatitis:  Continue Hydrocortisone.    4. Hypothyroidism:  Continue Synthroid.    5, DVT ppx:  Heparin.    Discussed with patient.  Discharge home today if she tolerates diet.

## 2022-07-22 NOTE — DISCHARGE NOTE PROVIDER - ATTENDING DISCHARGE PHYSICAL EXAMINATION:
GENERAL: alert, not in distress  CHEST/LUNG: b/l air entry  HEART: reg  ABDOMEN: soft, bs+  EXTREMITIES:  no edema, tenderness

## 2022-07-22 NOTE — PROGRESS NOTE ADULT - NS ATTEND AMEND GEN_ALL_CORE FT
I evaluated this pt. with my NP and agree with the above assessment and management plan. She remains symptomatic. Diet advanced to regular. If tolerated send home today with OPT f/u with her Hepatologist. probable resolved viral gastroenteritis.

## 2022-07-22 NOTE — DISCHARGE NOTE PROVIDER - CARE PROVIDER_API CALL
[facility])  Gastroenterology; Internal Medicine  [address] [place] [address]  Phone: [phone]  Fax: [phone]  Follow Up Time:

## 2022-07-22 NOTE — DISCHARGE NOTE PROVIDER - NSDCFUSCHEDAPPT_GEN_ALL_CORE_FT
[facility]  HEPATOLOGY [facility]  Scheduled Appointment: 09/06/2022

## 2022-07-22 NOTE — DISCHARGE NOTE NURSING/CASE MANAGEMENT/SOCIAL WORK - PATIENT PORTAL LINK FT
You can access the FollowMyHealth Patient Portal offered by [facility] by registering at the following website: [email or web address]. By joining [facility]’s FollowMyHealth portal, you will also be able to view your health information using other applications (apps) compatible with our system.

## 2022-07-22 NOTE — DISCHARGE NOTE PROVIDER - HOSPITAL COURSE
46 yr old female with autoimmune hepatitis on steroids, chronic pancreatitis, hypothyroidism, hyperlipidemia, CKD stage 3 presented with complaints of nausea, vomiting, abdominal pain. She was changed regimen from Budesonide to hydrocortisone, shortly after started to have these symptoms. Labs on admission, revealed PAZ on CKD, hyperkalemia, hyponatremia, mild elevated lipase and leucocytosis. CT abdomen without acute pathology, a left ovarian cyst. IVF was initiated, she was placed on a clear liquid diet. GI was consulted, attributed to viral gastroenteritis. Her renal function improved with IV hydration and improved to baseline. Her diet was advanced.  She is stable for discharge home.

## 2022-07-23 LAB
CULTURE RESULTS: SIGNIFICANT CHANGE UP
SPECIMEN SOURCE: SIGNIFICANT CHANGE UP

## 2022-09-06 ENCOUNTER — APPOINTMENT (OUTPATIENT)
Dept: HEPATOLOGY | Facility: CLINIC | Age: 47
End: 2022-09-06

## 2022-09-14 ENCOUNTER — LABORATORY RESULT (OUTPATIENT)
Age: 47
End: 2022-09-14

## 2022-09-14 PROBLEM — E78.5 HYPERLIPIDEMIA, UNSPECIFIED: Chronic | Status: ACTIVE | Noted: 2022-07-21

## 2022-09-14 PROBLEM — N83.209 UNSPECIFIED OVARIAN CYST, UNSPECIFIED SIDE: Chronic | Status: ACTIVE | Noted: 2022-07-21

## 2022-09-14 PROBLEM — E03.9 HYPOTHYROIDISM, UNSPECIFIED: Chronic | Status: ACTIVE | Noted: 2022-07-21

## 2022-09-14 PROBLEM — K75.4 AUTOIMMUNE HEPATITIS: Chronic | Status: ACTIVE | Noted: 2022-07-21

## 2022-09-14 PROBLEM — N18.30 CHRONIC KIDNEY DISEASE, STAGE 3 UNSPECIFIED: Chronic | Status: ACTIVE | Noted: 2022-07-21

## 2022-09-14 PROBLEM — M85.80 OTHER SPECIFIED DISORDERS OF BONE DENSITY AND STRUCTURE, UNSPECIFIED SITE: Chronic | Status: ACTIVE | Noted: 2022-07-21

## 2022-09-16 LAB
ALBUMIN SERPL ELPH-MCNC: 4.1 G/DL
ALP BLD-CCNC: 62 U/L
ALT SERPL-CCNC: 14 U/L
ANION GAP SERPL CALC-SCNC: 12 MMOL/L
AST SERPL-CCNC: 15 U/L
BASOPHILS # BLD AUTO: 0.07 K/UL
BASOPHILS NFR BLD AUTO: 0.6 %
BILIRUB SERPL-MCNC: 0.7 MG/DL
BUN SERPL-MCNC: 14 MG/DL
CALCIUM SERPL-MCNC: 9.3 MG/DL
CHLORIDE SERPL-SCNC: 104 MMOL/L
CO2 SERPL-SCNC: 23 MMOL/L
CREAT SERPL-MCNC: 1.19 MG/DL
EGFR: 58 ML/MIN/1.73M2
EOSINOPHIL # BLD AUTO: 0.11 K/UL
EOSINOPHIL NFR BLD AUTO: 1 %
GLUCOSE SERPL-MCNC: 80 MG/DL
HCT VFR BLD CALC: 35.7 %
HGB BLD-MCNC: 11.1 G/DL
IGG SER QL IEP: 928 MG/DL
IMM GRANULOCYTES NFR BLD AUTO: 0.2 %
LYMPHOCYTES # BLD AUTO: 5.86 K/UL
LYMPHOCYTES NFR BLD AUTO: 52.7 %
MAN DIFF?: NORMAL
MCHC RBC-ENTMCNC: 27.7 PG
MCHC RBC-ENTMCNC: 31.1 GM/DL
MCV RBC AUTO: 89 FL
MONOCYTES # BLD AUTO: 0.66 K/UL
MONOCYTES NFR BLD AUTO: 5.9 %
NEUTROPHILS # BLD AUTO: 4.4 K/UL
NEUTROPHILS NFR BLD AUTO: 39.6 %
PLATELET # BLD AUTO: 617 K/UL
POTASSIUM SERPL-SCNC: 4.6 MMOL/L
PROT SERPL-MCNC: 6.7 G/DL
RBC # BLD: 4.01 M/UL
RBC # FLD: 14.6 %
SODIUM SERPL-SCNC: 139 MMOL/L
WBC # FLD AUTO: 11.12 K/UL

## 2022-09-20 ENCOUNTER — APPOINTMENT (OUTPATIENT)
Dept: HEPATOLOGY | Facility: CLINIC | Age: 47
End: 2022-09-20

## 2022-09-20 DIAGNOSIS — N17.9 ACUTE KIDNEY FAILURE, UNSPECIFIED: ICD-10-CM

## 2022-09-20 PROCEDURE — 99214 OFFICE O/P EST MOD 30 MIN: CPT | Mod: 95

## 2022-09-20 RX ORDER — BUDESONIDE 3 MG/1
3 CAPSULE ORAL
Refills: 0 | Status: DISCONTINUED | COMMUNITY
End: 2022-09-20

## 2022-09-20 RX ORDER — HYDROCORTISONE 5 MG/1
5 TABLET ORAL
Qty: 90 | Refills: 1 | Status: DISCONTINUED | COMMUNITY
Start: 2022-01-13 | End: 2022-09-20

## 2022-09-21 NOTE — HISTORY OF PRESENT ILLNESS
[Autoimmune Disorder] : autoimmune disorder [Needlestick Exposure] : no needlestick exposure [Infected Sexual Partner] : no infected sexual partner [IV Drug Use] : no IV drug use [Tattoo] : no tattoos [Body Piercing] : no body piercing [Hemodialysis] : no hemodialysis [Transfusion before 1992] : no transfusion before 1992 [Transplant before 1992] : no transplant before 1992 [Alcohol Abuse] : no alcohol abuse [Household Contact to HBV] : no household contact to HBV [Travel to Endemic Area] : no travel to an endemic area [Occupational Exposure] : no occupational exposure [Cocaine Use] : no cocaine use [de-identified] : - 9/20/22: Pt. consented to audio and visual telehealth visit. The patient was at home, I was in the office at [address] [place] [address].\par She took hydrocortisone 5 mg qAM during her visit in [place], where it was very hot, then hospitalized in late July with dehydration and PAZ, started again on budesonide 3 mg qd. Had BW on 9/14 - creat improved 1.19 from past. Has not measured BP. She tries to eat low-salt diet. She started drinking Gatorade 2 weeks ago instead of tea and soda. Her son with cystic fibrosis was told to put extra salt in his Gatorade. \par \par - 6/14/22: returns after 4 months instead of 6 weeks, and hydrocortisone taper every 2 weeks to 7.5 mg/d. She reduced to 5 mg/d 6 weeks after the last visit. Feels better than in past, does not need to sleep during the day anymore. \par Exam: weight 140 lbs, BMI 26.5, still has Cushingoid facies with full cheeks. No buffalo hump, no abdo. striae distensae. \par \par - 2/1/22: returns after switching to methylprednisolone 10-0-5 mg/d. Was unable to taper as she developed COVID. Had antiviral infusion in the ED, BP at the time 70s after she had stopped the steroid. I had recommended to resume. Now feels at baseline again. Has difficulty sleeping at night, still tired during the day. Tells me that she was diagnosed with pernicious anemia in July 2021 and has been getting monthly VitB12 injections.\par Exam: body weight stable, but Cushingoid facies today - much cotton cheeks\par \par - 1/13/22: has been taking prednisone 5 mg/d, was unable to tolerate lower doses - feels terrible when she tries. Does not need to sleep in AM anymore, but at 8pm, sleeps 8-10 hrs, in the past only 6-8 hrs. Had recent bloodwork that showed PAZ, improved after repeat and drinking more water. Does not eat low-salt diet. Meds: no change.\par \par - 11/9/21: returns after 3 months instead of one. Taking prednisone 5 mg/d, after tapering from 9 mg/d in 1 mg/d decrements for 2 weeks each. At 4 mg/d, she had nausea three times after 3-4 days, and has been taking 5 m/d for the last 3 weeks. She saw another hematologist and was given VitB12 shots for pernicious anemia for the last 3 weeks- has not felt a difference. She does not have to sleep during the day anymore, but does feel tired. Sleeps 9 hrs/day.\par \par - 6/24/21: returns after weekly taper of prednisone from 15 mg/d to 5 mg/d over 5 weeks after March visit, repeat bloodwork in April, hospitalization in May for 3 days at [facility] with generalized abdominal pain, nausea and vomiting, diagnosed with mild acute pancreatitis. CT report reviewed: potential mild prominence of prox and mid pancreas. Lipase 5/18: 689 elevated, LFTs normal, hyponatremia Na 130 and hyperkalemia K 5.1. Treated with morphine x2. I then increased prednisone to 10 mg/d for adrenal insufficiency, but nausea didn't improve much; then EGD 5/25 - I prescribed pantoprazole 20 mg/d for gastritis with erosion. She had recurrent pain 2-3 days after the EGD and nausea & vomiting again but stayed at home for two weeks feeling like crap until hospitalization 6/14-17, told that it was gastritis and pantoprazole was increased to 40 mg/d. Hospital records (Pt's cell phone): labs 6/15: hypothermia 36.1 C, WBC 29.9 at 1am with 62% neutros and 1.1% immature grans (elevated), then 20 at 7am, Na 130, K 5.1 - hyponatremia and hyperkalemia, bili 2.2 elevated. No f/u labs available. CT 6/15 report: CCY, no cause of pain. She had hyperkalemia 5.1 (<=4.5) and PAZ creat 1.8. She was given morphine again x 2. Her level of energy has been ok throughout all of this. \par Prednisone was not changed - taking 10 mg/d. Sleeps better - from 21:00-6:00. No nausea recently. \par \par - 3/18/21: returns after repeat labs, done on 3/3, and seeing Dr. Mayer. Taking budesonide. Feels more fatigue than when steroid started at higher dose, goes to bed at 8pm, sleeps until she gets her kids out of the house, then sleeps another hour or so. \par \par - 2/4/21: returns after bloodwork last visit, UA, and fibroscan. Taking budesonide 3 mg/d, feels normal. EGD not done.\par \par - 1/14/21: returns after 2 months. Recent bloodwork not done. She postponed the EGD close to her home until after her 2nd COVID shot.\par \par - Ms. HORNE is a 45 year old woman with h/o acute pancreatitides (about 8-10 since age 10), hypothyroidism, osteoporosis, h/o gastritis (hospitalized 2/2020), CCY, who  was diagnosed with AIH by her gastroenterologist at the time, Dr. Hood, after she developed dark urine, jaundice, fatigue and some vomiting, and was found to have elevated LFTs with bilirubin 7.2, AST//763, ALP, pos. anti-actin 69 U, MELISSA+,  based on a liver biopsy. She saw Dr. Naranjo at our office for a second opinion after she had developed side-effects to treatment with prednisone, initially 60 mg/d. LFTs had improved. Dr. Naranjo ordered bloodwork, planned to review the liver biopsy, and agreed with tapering prednisone, and recommended the addition of azathioprine. \par The patient did not follow-up at that time. Labs form 9/2016 show AST/ALT mildly elevated, and negative MELISSA, ASMA and normal IgG of 759. She was treated with prednisone for 6 months, then followed with Dr. Andra Morton at [facility] and wishes to transfer care here as it is closer to her home.\par She developed acute pancreatitis to azathioprine and was treated with budesonide 3 mg/d until 8/2020. She developed persistent nausea and vomiting about a week later that lasted for more than three weeks, and RUQ pain, and extreme exhaustion and fatigue. Her gastroenterologist found elevated AST/ALT 87/78, K 4.3, Na 136, Creat 1.33, BUN 20, and resumed budesonide 9 mg/d on 9/29/20. She rapidly improved and has been feeling normally recently. Recent LFTs were normal as below. \par \par Weight hx: 138 lbs, BMI 26.1 after 10 lbs weight loss in 6 weeks by dieting. Max. weight was 148 lbs. Longstanding overweight 130-140 lbs.\par Alcohol hx: does not drink, never did, because of her pancreatitides. 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 Biopsied. Dilated lacteals duodenum, biopsied. PPI prescribed. Path: chronic active gastritis with intestinal metaplasia. Duodenum normal. \par - 1/29/21 DEXA scan (scanned): normal. \par - 1/19/21 fibroscan: 3.8 kPa, 217 dB/m - stage 0 fibrosis, stage 0 steatosis. Interpretation corrected, also values and interpretation in chart note from 1/19/21.\par - 10/12/20 [facility] abdomen ww ([facility]): normal liver morphology. Vessels patent. Coronary varices, suggestive of portal HTN.\par - 8/4/16 liver biopsy ([facility]; scanned): moderate to marked inflammatory infiltrate involving portal areas with interface activity and extends into lobules. [...] Lymphocytes, plasma cells, eosinophils, some neutrophils., including rare neutrophils in small bile ducts. Lobular cholestasis is also present. No evidence of fibrosis. [...]not entirely characteristic, given pos. MELISSA, AMA, compatible with AIH.

## 2022-09-21 NOTE — ASSESSMENT
[FreeTextEntry1] : 46 yoF with autoimmune hepatitis, cystic fibrosis carrier, VitB12 deficiency 7/2021 getting monthly injections, h/o acute pancreatitides (about 8-10 since age 10), hypothyroidism, osteoporosis, h/o gastritis (hospitalized 2/2020), CCY. AIH diagnosed in 2016 by her gastroenterologist at the time, Dr. Hood, initially bilirubin 7.2, AST//763, normal IgG 759, pos. anti-actin 69 U, MELISSA+,  and typical findings on liver biopsy in 2016 (scanned); referred in 11/2020, after she had acute episode of pancreatitis, followed by nausea, vomiting, severe fatigue, and AST/ALT elevation to ~80 U/L, which all resolved after budesonide was resumed in 9/2020. \par \par - adrenal insufficiency after years of steroid use, unable to taper prednisone to < 5 mg/d in 2021 - developed nausea, excessive fatigue and hypotension, and PAZ, which have required hospitalization in the past. Sees endocrinology. Methylprednisolone 10-0-5 mg started 1/2022, tolerated taper to 5 mg qAM over 6 weeks, but developed dehydration and PAZ requiring hospitalization after a visit in [place] where it was hot. Started on budesonide 3 mg/d again. Uses low-salt diet. Has mild cushingoid features - LFTs and IgG normal in January.\par \par - AIH; no fibrosis or steatosis seen on fibroscan 1/2021\par - AST/ALT have been normal since first visit. Bilirubin once mildly increased to 1.6, possibly due to Gilbert's disease.\par - fatty liver seen on US 3/2021, but not on MRI. Is overweight BMI 27\par \par - abdominal varices on MRI 10/2020, but no esophageal varices on EGD 5/25/21, did have one gastric erosion. H. pylori negative. \par \par - no osteopenia or osteoporosis seen on DEXA scan 1/2021\par - overweight, HbA1c normal\par - CKD 3, creat 1.30. Saw Dr. Mayer in past, and another nephrologist in 2022, was told that she had small kidneys.\par \par - 5/2021: hospitalization [facility] with mild acute pancreatitis: diffuse abdom. pain, lipase >600 U/L, pain resolved quickly after morphine x 2. CT showed possible mild pancreatic fullness. Also had hyponatremia Na 130 and hyperkalemia K 5.1 (<4.5). I increased prednisone to 10 mg/d after d/c for adrenal insufficiency - may have been relative insufficiency in setting of illness. Pancreatitis possibly due to CBD stone passage (CCY) given very short course, or poss. cystic fibrosis. \par \par - 6/15/21: hospitalization [facility] x 3d for N/V/pain: lipase 70, CT no cause of pain. Hypothermia 36.1 C, transient leukocytosis WBC 30->20 within 6 hrs, mildly incr. granulocytes and immature grans. Again rapid resolution of pain morphine given x 2. Also hyponatremia, hyperkalemia PAZ creat 1.8, hyperbilirubinemia 2.2, nl other LFTs (only one timepoint). DD: infection, adrenal insufficiency, very mild pancreatitis. Pred 10 continued, pantoprazole incr to 40.\par \par \par - likely intolerant of azathioprine - acute pancreatitis after initiation\par - longstanding h/o being overweight, BMI 26.1 \par - PAZ, again due to recent dehydration. eGFR 61 - suggestive of CKD2. No HTN. No h/o significant NSAID intake. UA normal 1/19/21.\par - transient bilirubin elevation in January: DD includes Gilbert's disease. Elevation in May 2021: suspect gallstone passage.\par \par \par - immune to hepatitis B due to vaccination, not immune to hepatitis A\par \par - spotty follow-up \par \par Plan:\par - hypoaldosteronism: stop budesonide, start prednisone 5 mg/d x 1 month, then 4 mg/d. Return in 6 weeks after repeat bloodwork. She will continue drinking Gatorade and cook with more salt. I also advised to eat some extra salty food on days she sweats. \par \par - recurrent pancreatitis, FHx of cystic fibrosis in her son: sputum test for cystic fibrosis not covered by insurance \par

## 2022-09-21 NOTE — CONSULT LETTER
[Dear  ___] : Dear  [unfilled], [Consult Letter:] : I had the pleasure of evaluating your patient, [unfilled]. [Please see my note below.] : Please see my note below. [Consult Closing:] : Thank you very much for allowing me to participate in the care of this patient.  If you have any questions, please do not hesitate to contact me. [FreeTextEntry2] : NICKY MALHOTRA (PCP)\par  [FreeTextEntry3] : Waldemar Gaming MD\par , [facility]\par General Hepatology and Gastroenterology\par [email or web address] for Liver Diseases\par [facility]\par [address] [place] [address]\[place] [address]\par [phone]\par

## 2022-11-10 ENCOUNTER — APPOINTMENT (OUTPATIENT)
Dept: CARDIOLOGY | Facility: CLINIC | Age: 47
End: 2022-11-10

## 2022-11-10 ENCOUNTER — NON-APPOINTMENT (OUTPATIENT)
Age: 47
End: 2022-11-10

## 2022-11-10 VITALS
WEIGHT: 138 LBS | HEART RATE: 84 BPM | HEIGHT: 61 IN | BODY MASS INDEX: 26.06 KG/M2 | SYSTOLIC BLOOD PRESSURE: 120 MMHG | RESPIRATION RATE: 14 BRPM | DIASTOLIC BLOOD PRESSURE: 80 MMHG

## 2022-11-10 DIAGNOSIS — R94.31 ABNORMAL ELECTROCARDIOGRAM [ECG] [EKG]: ICD-10-CM

## 2022-11-10 PROCEDURE — 93000 ELECTROCARDIOGRAM COMPLETE: CPT

## 2022-11-10 PROCEDURE — 99205 OFFICE O/P NEW HI 60 MIN: CPT | Mod: 25

## 2022-11-10 RX ORDER — CALCIUM CARBONATE 500(1250)
500 TABLET ORAL
Refills: 0 | Status: DISCONTINUED | COMMUNITY
End: 2022-11-10

## 2022-11-10 RX ORDER — OMEGA-3/DHA/EPA/FISH OIL 300-1000MG
1000 CAPSULE ORAL TWICE DAILY
Qty: 180 | Refills: 3 | Status: DISCONTINUED | COMMUNITY
Start: 2021-03-08 | End: 2022-11-10

## 2022-11-10 NOTE — HISTORY OF PRESENT ILLNESS
[FreeTextEntry1] : 46 Y/o female with hx of autoinmune hepatitis, chronic pancreatitis, adrenal insufficiency, hypothyroidism, who comes to the office self referred for evaluation of an abnormal EKG (left atrial enlargement) done on a recent visit at [facility].   Patient also reports that for the past few weeks she has experienced progressive dyspnea of exertion. \par she denies chest pain, no palpitations, no PND, no orthopnea, no leg edema,  no claudication, no syncope.\par \par Patient reports that her mother recently  unexpectedly (she was in her early 60s)

## 2022-11-15 ENCOUNTER — APPOINTMENT (OUTPATIENT)
Dept: HEPATOLOGY | Facility: CLINIC | Age: 47
End: 2022-11-15

## 2022-11-15 VITALS
HEIGHT: 61 IN | BODY MASS INDEX: 25.86 KG/M2 | SYSTOLIC BLOOD PRESSURE: 120 MMHG | WEIGHT: 137 LBS | DIASTOLIC BLOOD PRESSURE: 84 MMHG | TEMPERATURE: 98 F | HEART RATE: 77 BPM | OXYGEN SATURATION: 98 %

## 2022-11-15 DIAGNOSIS — I86.8 VARICOSE VEINS OF OTHER SPECIFIED SITES: ICD-10-CM

## 2022-11-15 DIAGNOSIS — Z92.89 PERSONAL HISTORY OF OTHER MEDICAL TREATMENT: ICD-10-CM

## 2022-11-15 PROCEDURE — 99214 OFFICE O/P EST MOD 30 MIN: CPT

## 2022-11-16 ENCOUNTER — APPOINTMENT (OUTPATIENT)
Dept: CARDIOLOGY | Facility: CLINIC | Age: 47
End: 2022-11-16

## 2022-11-16 LAB
ALBUMIN SERPL ELPH-MCNC: 5.1 G/DL
ALP BLD-CCNC: 78 U/L
ALT SERPL-CCNC: 25 U/L
ANION GAP SERPL CALC-SCNC: 15 MMOL/L
AST SERPL-CCNC: 21 U/L
BASOPHILS # BLD AUTO: 0.09 K/UL
BASOPHILS NFR BLD AUTO: 0.5 %
BILIRUB SERPL-MCNC: 1 MG/DL
BUN SERPL-MCNC: 22 MG/DL
CALCIUM SERPL-MCNC: 10.3 MG/DL
CHLORIDE SERPL-SCNC: 93 MMOL/L
CO2 SERPL-SCNC: 23 MMOL/L
CREAT SERPL-MCNC: 1.51 MG/DL
EGFR: 43 ML/MIN/1.73M2
EOSINOPHIL # BLD AUTO: 0.04 K/UL
EOSINOPHIL NFR BLD AUTO: 0.2 %
GLUCOSE SERPL-MCNC: 85 MG/DL
HCT VFR BLD CALC: 41.3 %
HGB BLD-MCNC: 13 G/DL
IMM GRANULOCYTES NFR BLD AUTO: 0.7 %
INR PPP: 0.89 RATIO
LYMPHOCYTES # BLD AUTO: 4.51 K/UL
LYMPHOCYTES NFR BLD AUTO: 27.1 %
MAN DIFF?: NORMAL
MCHC RBC-ENTMCNC: 26.8 PG
MCHC RBC-ENTMCNC: 31.5 GM/DL
MCV RBC AUTO: 85.2 FL
MONOCYTES # BLD AUTO: 0.81 K/UL
MONOCYTES NFR BLD AUTO: 4.9 %
NEUTROPHILS # BLD AUTO: 11.09 K/UL
NEUTROPHILS NFR BLD AUTO: 66.6 %
PLATELET # BLD AUTO: 745 K/UL
POTASSIUM SERPL-SCNC: 6.1 MMOL/L
PROT SERPL-MCNC: 8.1 G/DL
PT BLD: 10.5 SEC
RBC # BLD: 4.85 M/UL
RBC # FLD: 15.6 %
SODIUM SERPL-SCNC: 131 MMOL/L
WBC # FLD AUTO: 16.66 K/UL

## 2022-11-16 PROCEDURE — 93306 TTE W/DOPPLER COMPLETE: CPT

## 2022-11-17 NOTE — HISTORY OF PRESENT ILLNESS
[Autoimmune Disorder] : autoimmune disorder [Needlestick Exposure] : no needlestick exposure [Infected Sexual Partner] : no infected sexual partner [IV Drug Use] : no IV drug use [Tattoo] : no tattoos [Body Piercing] : no body piercing [Hemodialysis] : no hemodialysis [Transfusion before 1992] : no transfusion before 1992 [Transplant before 1992] : no transplant before 1992 [Alcohol Abuse] : no alcohol abuse [Household Contact to HBV] : no household contact to HBV [Travel to Endemic Area] : no travel to an endemic area [Occupational Exposure] : no occupational exposure [Cocaine Use] : no cocaine use [de-identified] : 11/15/22: returns after advised to stop budesonide and start prednisone 5 mg/d x 1 month, then 4 mg/d, and to take up more salt. I spoke with her on 10/28 after she was hospitalized at [address] on 9/29/22 with nausea and vomiting and upper abdom. pain where she was told she had a flare of autoimmune hepatitis. However, as her LFTs were reportedly normal, it was adrenal insufficiency - this happened while on prednisone 5 mg/d, and I had told her to increase to 10 mg/d. BW not done.\par [facility] records: on admission, excrutiating upper abdom. pain, which resolved within a day, lipase was 94, EUS showed normal pancreas. Hyponatremia Na 131, K high 4.7, PAZ creat 1.6. Was given 8 bags of fluid at least. EGD/EUS: no ulcer, see below. She was also given an antibiotic for a UTI, but denied urinary symptoms. She felt dizzy 2d ago and her BP at home 98/76 mmHg. Drank chicken soup, felt normal after 2 hrs. Tolerating pred 10 mg, after initial difficulty falling asleep (took Benadryl at night)\par Exam: 137 lbs, BMI 25.9\par \par - 9/20/22: Pt. consented to audio and visual telehealth visit. The patient was at home, I was in the office at [address] [place] [address].\par She took hydrocortisone 5 mg qAM during her visit in [place], where it was very hot, then hospitalized in late July with dehydration and PAZ, started again on budesonide 3 mg qd. Had BW on 9/14 - creat improved 1.19 from past. Has not measured BP. She tries to eat low-salt diet. She started drinking Gatorade 2 weeks ago instead of tea and soda. Her son with cystic fibrosis was told to put extra salt in his Gatorade. \par \par - 6/14/22: returns after 4 months instead of 6 weeks, and hydrocortisone taper every 2 weeks to 7.5 mg/d. She reduced to 5 mg/d 6 weeks after the last visit. Feels better than in past, does not need to sleep during the day anymore. \par Exam: weight 140 lbs, BMI 26.5, still has Cushingoid facies with full cheeks. No buffalo hump, no abdo. striae distensae. \par \par - 2/1/22: returns after switching to methylprednisolone 10-0-5 mg/d. Was unable to taper as she developed COVID. Had antiviral infusion in the ED, BP at the time 70s after she had stopped the steroid. I had recommended to resume. Now feels at baseline again. Has difficulty sleeping at night, still tired during the day. Tells me that she was diagnosed with pernicious anemia in July 2021 and has been getting monthly VitB12 injections.\par Exam: body weight stable, but Cushingoid facies today - much cotton cheeks\par \par - 1/13/22: has been taking prednisone 5 mg/d, was unable to tolerate lower doses - feels terrible when she tries. Does not need to sleep in AM anymore, but at 8pm, sleeps 8-10 hrs, in the past only 6-8 hrs. Had recent bloodwork that showed PZA, improved after repeat and drinking more water. Does not eat low-salt diet. Meds: no change.\par \par - 11/9/21: returns after 3 months instead of one. Taking prednisone 5 mg/d, after tapering from 9 mg/d in 1 mg/d decrements for 2 weeks each. At 4 mg/d, she had nausea three times after 3-4 days, and has been taking 5 m/d for the last 3 weeks. She saw another hematologist and was given VitB12 shots for pernicious anemia for the last 3 weeks- has not felt a difference. She does not have to sleep during the day anymore, but does feel tired. Sleeps 9 hrs/day.\par \par - 6/24/21: returns after weekly taper of prednisone from 15 mg/d to 5 mg/d over 5 weeks after March visit, repeat bloodwork in April, hospitalization in May for 3 days at [facility] with generalized abdominal pain, nausea and vomiting, diagnosed with mild acute pancreatitis. CT report reviewed: potential mild prominence of prox and mid pancreas. Lipase 5/18: 689 elevated, LFTs normal, hyponatremia Na 130 and hyperkalemia K 5.1. Treated with morphine x2. I then increased prednisone to 10 mg/d for adrenal insufficiency, but nausea didn't improve much; then EGD 5/25 - I prescribed pantoprazole 20 mg/d for gastritis with erosion. She had recurrent pain 2-3 days after the EGD and nausea & vomiting again but stayed at home for two weeks feeling like crap until hospitalization 6/14-17, told that it was gastritis and pantoprazole was increased to 40 mg/d. Hospital records (Pt's cell phone): labs 6/15: hypothermia 36.1 C, WBC 29.9 at 1am with 62% neutros and 1.1% immature grans (elevated), then 20 at 7am, Na 130, K 5.1 - hyponatremia and hyperkalemia, bili 2.2 elevated. No f/u labs available. CT 6/15 report: CCY, no cause of pain. She had hyperkalemia 5.1 (<=4.5) and PAZ creat 1.8. She was given morphine again x 2. Her level of energy has been ok throughout all of this. \par Prednisone was not changed - taking 10 mg/d. Sleeps better - from 21:00-6:00. No nausea recently. \par \par - 3/18/21: returns after repeat labs, done on 3/3, and seeing Dr. Mayer. Taking budesonide. Feels more fatigue than when steroid started at higher dose, goes to bed at 8pm, sleeps until she gets her kids out of the house, then sleeps another hour or so. \par \par - 2/4/21: returns after bloodwork last visit, UA, and fibroscan. Taking budesonide 3 mg/d, feels normal. EGD not done.\par \par - 1/14/21: returns after 2 months. Recent bloodwork not done. She postponed the EGD close to her home until after her 2nd COVID shot.\par \par - Ms. HORNE is a 45 year old woman with h/o acute pancreatitides (about 8-10 since age 10), hypothyroidism, osteoporosis, h/o gastritis (hospitalized 2/2020), CCY, who  was diagnosed with AIH by her gastroenterologist at the time, Dr. Hood, after she developed dark urine, jaundice, fatigue and some vomiting, and was found to have elevated LFTs with bilirubin 7.2, AST//763, ALP, pos. anti-actin 69 U, MELISSA+,  based on a liver biopsy. She saw Dr. Naranjo at our office for a second opinion after she had developed side-effects to treatment with prednisone, initially 60 mg/d. LFTs had improved. Dr. Naranjo ordered bloodwork, planned to review the liver biopsy, and agreed with tapering prednisone, and recommended the addition of azathioprine. \par The patient did not follow-up at that time. Labs form 9/2016 show AST/ALT mildly elevated, and negative MELISSA, ASMA and normal IgG of 759. She was treated with prednisone for 6 months, then followed with Dr. Andra Morton at [facility] and wishes to transfer care here as it is closer to her home.\par She developed acute pancreatitis to azathioprine and was treated with budesonide 3 mg/d until 8/2020. She developed persistent nausea and vomiting about a week later that lasted for more than three weeks, and RUQ pain, and extreme exhaustion and fatigue. Her gastroenterologist found elevated AST/ALT 87/78, K 4.3, Na 136, Creat 1.33, BUN 20, and resumed budesonide 9 mg/d on 9/29/20. She rapidly improved and has been feeling normally recently. Recent LFTs were normal as below. \par \par Weight hx: 138 lbs, BMI 26.1 after 10 lbs weight loss in 6 weeks by dieting. Max. weight was 148 lbs. Longstanding overweight 130-140 lbs.\par Alcohol hx: does not drink, never did, because of her pancreatitides. 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 Biopsied. Dilated lacteals duodenum, biopsied. PPI prescribed. Path: chronic active gastritis with intestinal metaplasia. Duodenum normal. \par - 1/29/21 DEXA scan (scanned): normal. \par - 1/19/21 fibroscan: 3.8 kPa, 217 dB/m - stage 0 fibrosis, stage 0 steatosis. Interpretation corrected, also values and interpretation in chart note from 1/19/21.\par - 10/12/20 MRI abdomen wwo ([facility]): normal liver morphology. Vessels patent. Coronary varices, suggestive of portal HTN.\par - 8/4/16 liver biopsy ([facility]; scanned): moderate to marked inflammatory infiltrate involving portal areas with interface activity and extends into lobules. [...] Lymphocytes, plasma cells, eosinophils, some neutrophils., including rare neutrophils in small bile ducts. Lobular cholestasis is also present. No evidence of fibrosis. [...]not entirely characteristic, given pos. MELISSA, AMA, compatible with AIH.

## 2022-11-17 NOTE — CONSULT LETTER
[Dear  ___] : Dear  [unfilled], [Consult Letter:] : I had the pleasure of evaluating your patient, [unfilled]. [Please see my note below.] : Please see my note below. [Consult Closing:] : Thank you very much for allowing me to participate in the care of this patient.  If you have any questions, please do not hesitate to contact me. [FreeTextEntry2] : NICKY MALHOTRA (PCP)\par  [FreeTextEntry3] : Waldemar Gaming MD\par , [facility]\par General Hepatology and Gastroenterology\par [facility] for Liver Diseases\par [facility]\par [address] [place] [address]\[place] [address]\par [phone]\par

## 2022-11-17 NOTE — ASSESSMENT
[FreeTextEntry1] : Ms. HORNE is a 47 year old woman with autoimmune hepatitis, cystic fibrosis carrier, VitB12 deficiency 7/2021 getting monthly injections, h/o acute pancreatitides (about 8-10 since age 10), hypothyroidism, osteoporosis, h/o gastritis (hospitalized 2/2020), CCY. AIH diagnosed in 2016 by her gastroenterologist at the time, Dr. Hood, initially bilirubin 7.2, AST//763, normal IgG 759, pos. anti-actin 69 U, MELISSA+,  and typical findings on liver biopsy in 2016 (scanned); referred in 11/2020, after she had acute episode of pancreatitis, followed by nausea, vomiting, severe fatigue, and AST/ALT elevation to ~80 U/L, which all resolved after budesonide was resumed in 9/2020. \par \par - adrenal insufficiency after years of steroid use since 2016, unable to taper and stop. Adrenal glands appear normal on imaging. Hospitalized repeatedly in 2021, and 2022, most recently 10/2022 while on prednisone 5 mg/d, and in 9/2022 while on hydrocortisone 5 mg/d after a vacation in [place] where it was very hot. Stopped low-salt diet in fall of 2022, also drinks Gatorade instead of water. Has mild cushingoid features \par - LFTs  normal during hospitalization 10/2022 at [facility].\par - abdominal pain was likely related to Addisonian crisis, as it resolved within 2d with hydration, did not radiate to her back, and pancreas appeared normal on EUS (will scan report).\par \par - AIH; no fibrosis or steatosis seen on fibroscan 1/2021\par - AST/ALT have been normal since first visit. Bilirubin once mildly increased to 1.6, possibly due to Gilbert's disease.\par - fatty liver seen on US 3/2021, but not on MRI. Is overweight BMI 27\par \par - abdominal varices on MRI 10/2020, but no esophageal varices on EGD 5/25/21, did have one gastric erosion. H. pylori negative. \par \par - no osteopenia or osteoporosis seen on DEXA scan 1/2021\par - overweight, HbA1c normal\par - CKD 3, creat 1.30. Saw Dr. Mayer in past, and another nephrologist in 2022, was told that she had small kidneys.\par - gastritis and medium hiatal hernia on EGD/EUS at [facility] 9/2022, on pantoprazole\par \par - 5/2021: hospitalization [facility] with mild acute pancreatitis: diffuse abdom. pain, lipase >600 U/L, pain resolved quickly after morphine x 2. CT showed possible mild pancreatic fullness. Also had hyponatremia Na 130 and hyperkalemia K 5.1 (<4.5). I increased prednisone to 10 mg/d after d/c for adrenal insufficiency - may have been relative insufficiency in setting of illness. Pancreatitis possibly due to CBD stone passage (CCY) given very short course, or poss. cystic fibrosis. \par \par - 6/15/21: hospitalization [facility] x 3d for N/V/pain: lipase 70, CT no cause of pain. Hypothermia 36.1 C, transient leukocytosis WBC 30->20 within 6 hrs, mildly incr. granulocytes and immature grans. Again rapid resolution of pain morphine given x 2. Also hyponatremia, hyperkalemia PAZ creat 1.8, hyperbilirubinemia 2.2, nl other LFTs (only one timepoint). DD: infection, adrenal insufficiency, very mild pancreatitis. Pred 10 continued, pantoprazole incr to 40.\par \par \par - likely intolerant of azathioprine - acute pancreatitis after initiation\par - longstanding h/o being overweight, BMI 26.1 \par - PAZ, again due to recent dehydration. eGFR 61 - suggestive of CKD2. No HTN. No h/o significant NSAID intake. UA normal 1/19/21.\par - transient bilirubin elevation in January: DD includes Gilbert's disease. Elevation in May 2021: suspect gallstone passage.\par \par \par - immune to hepatitis B due to vaccination, not immune to hepatitis A\par \par - spotty follow-up \par \par Plan:\par - hypoaldosteronism: stop prednisone 10 mg, start again hydrocortisone 10 mg qAM, 5 mg qPM afternoon.\par - BW today and before next visit in 1 month (has appointment). \par - gastritis: continue pantoprazole for 1 month\par - recurrent pancreatitis, FHx of cystic fibrosis in her son: sputum test for cystic fibrosis not covered by insurance \par

## 2022-11-18 ENCOUNTER — LABORATORY RESULT (OUTPATIENT)
Age: 47
End: 2022-11-18

## 2022-11-18 LAB
ALBUMIN SERPL ELPH-MCNC: 4.3 G/DL
ALBUMIN SERPL ELPH-MCNC: 4.3 G/DL
ALP BLD-CCNC: 64 U/L
ALP BLD-CCNC: 64 U/L
ALT SERPL-CCNC: 23 U/L
ALT SERPL-CCNC: 23 U/L
ANION GAP SERPL CALC-SCNC: 11 MMOL/L
ANION GAP SERPL CALC-SCNC: 13 MMOL/L
AST SERPL-CCNC: 19 U/L
AST SERPL-CCNC: 20 U/L
BILIRUB SERPL-MCNC: 0.8 MG/DL
BILIRUB SERPL-MCNC: 0.8 MG/DL
BUN SERPL-MCNC: 19 MG/DL
BUN SERPL-MCNC: 19 MG/DL
CALCIUM SERPL-MCNC: 9.8 MG/DL
CALCIUM SERPL-MCNC: 9.8 MG/DL
CHLORIDE SERPL-SCNC: 98 MMOL/L
CHLORIDE SERPL-SCNC: 98 MMOL/L
CO2 SERPL-SCNC: 25 MMOL/L
CO2 SERPL-SCNC: 25 MMOL/L
CREAT SERPL-MCNC: 1.18 MG/DL
CREAT SERPL-MCNC: 1.19 MG/DL
EGFR: 57 ML/MIN/1.73M2
EGFR: 58 ML/MIN/1.73M2
GLUCOSE SERPL-MCNC: 81 MG/DL
GLUCOSE SERPL-MCNC: 82 MG/DL
IGG SER QL IEP: 887 MG/DL
POTASSIUM SERPL-SCNC: 4.8 MMOL/L
POTASSIUM SERPL-SCNC: 4.8 MMOL/L
PROT SERPL-MCNC: 6.9 G/DL
PROT SERPL-MCNC: 7 G/DL
SODIUM SERPL-SCNC: 135 MMOL/L
SODIUM SERPL-SCNC: 136 MMOL/L

## 2022-11-21 LAB
BASOPHILS # BLD AUTO: 0 K/UL
BASOPHILS # BLD AUTO: 0.06 K/UL
BASOPHILS NFR BLD AUTO: 0 %
BASOPHILS NFR BLD AUTO: 0.5 %
EOSINOPHIL # BLD AUTO: 0.1 K/UL
EOSINOPHIL # BLD AUTO: 0.31 K/UL
EOSINOPHIL NFR BLD AUTO: 0.8 %
EOSINOPHIL NFR BLD AUTO: 2.6 %
HCT VFR BLD CALC: 37.6 %
HCT VFR BLD CALC: 38 %
HGB BLD-MCNC: 11.5 G/DL
HGB BLD-MCNC: 11.7 G/DL
IMM GRANULOCYTES NFR BLD AUTO: 0.6 %
LYMPHOCYTES # BLD AUTO: 4.21 K/UL
LYMPHOCYTES # BLD AUTO: 5.19 K/UL
LYMPHOCYTES NFR BLD AUTO: 35.7 %
LYMPHOCYTES NFR BLD AUTO: 43.5 %
MAN DIFF?: NORMAL
MAN DIFF?: NORMAL
MCHC RBC-ENTMCNC: 26.3 PG
MCHC RBC-ENTMCNC: 26.5 PG
MCHC RBC-ENTMCNC: 30.6 GM/DL
MCHC RBC-ENTMCNC: 30.8 GM/DL
MCV RBC AUTO: 86 FL
MCV RBC AUTO: 86 FL
MONOCYTES # BLD AUTO: 1.1 K/UL
MONOCYTES # BLD AUTO: 1.33 K/UL
MONOCYTES NFR BLD AUTO: 11.3 %
MONOCYTES NFR BLD AUTO: 9.2 %
NEUTROPHILS # BLD AUTO: 5.42 K/UL
NEUTROPHILS # BLD AUTO: 5.95 K/UL
NEUTROPHILS NFR BLD AUTO: 45.4 %
NEUTROPHILS NFR BLD AUTO: 50.4 %
PLATELET # BLD AUTO: 641 K/UL
PLATELET # BLD AUTO: 672 K/UL
RBC # BLD: 4.37 M/UL
RBC # BLD: 4.42 M/UL
RBC # FLD: 15.4 %
RBC # FLD: 15.6 %
WBC # FLD AUTO: 11.8 K/UL
WBC # FLD AUTO: 11.94 K/UL

## 2022-11-22 ENCOUNTER — APPOINTMENT (OUTPATIENT)
Dept: CT IMAGING | Facility: CLINIC | Age: 47
End: 2022-11-22

## 2022-11-22 ENCOUNTER — OUTPATIENT (OUTPATIENT)
Dept: OUTPATIENT SERVICES | Facility: HOSPITAL | Age: 47
LOS: 1 days | End: 2022-11-22
Payer: COMMERCIAL

## 2022-11-22 DIAGNOSIS — Z98.891 HISTORY OF UTERINE SCAR FROM PREVIOUS SURGERY: Chronic | ICD-10-CM

## 2022-11-22 DIAGNOSIS — Z98.890 OTHER SPECIFIED POSTPROCEDURAL STATES: Chronic | ICD-10-CM

## 2022-11-22 DIAGNOSIS — Z00.8 ENCOUNTER FOR OTHER GENERAL EXAMINATION: ICD-10-CM

## 2022-11-22 PROCEDURE — 75574 CT ANGIO HRT W/3D IMAGE: CPT | Mod: 26

## 2022-11-22 PROCEDURE — 75574 CT ANGIO HRT W/3D IMAGE: CPT

## 2022-12-13 ENCOUNTER — APPOINTMENT (OUTPATIENT)
Dept: CARDIOLOGY | Facility: CLINIC | Age: 47
End: 2022-12-13

## 2022-12-13 ENCOUNTER — NON-APPOINTMENT (OUTPATIENT)
Age: 47
End: 2022-12-13

## 2022-12-13 VITALS
RESPIRATION RATE: 16 BRPM | BODY MASS INDEX: 26.24 KG/M2 | WEIGHT: 139 LBS | HEIGHT: 61 IN | SYSTOLIC BLOOD PRESSURE: 100 MMHG | HEART RATE: 89 BPM | DIASTOLIC BLOOD PRESSURE: 60 MMHG | OXYGEN SATURATION: 99 %

## 2022-12-13 DIAGNOSIS — R06.02 SHORTNESS OF BREATH: ICD-10-CM

## 2022-12-13 DIAGNOSIS — I45.4 NONSPECIFIC INTRAVENTRICULAR BLOCK: ICD-10-CM

## 2022-12-13 PROCEDURE — 93000 ELECTROCARDIOGRAM COMPLETE: CPT

## 2022-12-13 PROCEDURE — 99215 OFFICE O/P EST HI 40 MIN: CPT | Mod: 25

## 2022-12-13 NOTE — ASSESSMENT
[FreeTextEntry1] : 46 Y/o female with hx of autoinmune hepatitis, chronic pancreatitis, adrenal insufficiency, hypothyroidism, who las t seen in the office back on 11/10/22 due to an abnormal EKG (left atrial enlargement), during last visit patient was also reporting progressive dyspnea of exertion. CCTA  and echo were ordered. \par Patient still report shortness of breath on exertion \par she denies chest pain, no palpitations, no PND, no orthopnea, no leg edema, no claudication, no syncope.\par \par echo 11/2022; Normal biventricular systolic function. \par CCTA 11/2022, CAC zero, no evidence of coronary stenosis or plaque.\par \par #Dyspnea of exertion \par unlikely from cardiac etiology \par Cardiac CT with no evidence of extra-cardiac findings to account for OBRIEN. \par will refer to pulmonary for further testing\par \par #Abnormal EKG\par incomplete RBBB\par Will continue to monitor \par

## 2022-12-13 NOTE — HISTORY OF PRESENT ILLNESS
[FreeTextEntry1] : 48 Y/o female with hx of autoinmune hepatitis, chronic pancreatitis, adrenal insufficiency, hypothyroidism, who las t seen in the office back on 11/10/22 due to an abnormal EKG (left atrial enlargement), during last visit patient was also reporting progressive dyspnea of exertion. CCTA  and echo were ordered. \par Patient still report shortness of breath on exertion \par she denies chest pain, no palpitations, no PND, no orthopnea, no leg edema, no claudication, no syncope.\par \par

## 2022-12-13 NOTE — CARDIOLOGY SUMMARY
[de-identified] : 11/10/22: JOHN \par 12/13/22: ALLIE LOPEZ [de-identified] : 11/2022; Normal biventricular systolic function.  [de-identified] : CCTA 11/2022, CAC zero, no evidence of coronary stenosis or plaque.

## 2022-12-18 ENCOUNTER — RESULT CHARGE (OUTPATIENT)
Age: 47
End: 2022-12-18

## 2022-12-20 ENCOUNTER — APPOINTMENT (OUTPATIENT)
Dept: HEPATOLOGY | Facility: CLINIC | Age: 47
End: 2022-12-20

## 2023-02-17 ENCOUNTER — APPOINTMENT (OUTPATIENT)
Dept: PULMONOLOGY | Facility: CLINIC | Age: 48
End: 2023-02-17

## 2023-02-23 ENCOUNTER — APPOINTMENT (OUTPATIENT)
Dept: PULMONOLOGY | Facility: CLINIC | Age: 48
End: 2023-02-23
Payer: COMMERCIAL

## 2023-02-23 VITALS
OXYGEN SATURATION: 98 % | HEIGHT: 61 IN | HEART RATE: 91 BPM | DIASTOLIC BLOOD PRESSURE: 58 MMHG | SYSTOLIC BLOOD PRESSURE: 108 MMHG | RESPIRATION RATE: 16 BRPM | BODY MASS INDEX: 26.43 KG/M2 | WEIGHT: 140 LBS

## 2023-02-23 DIAGNOSIS — J84.9 INTERSTITIAL PULMONARY DISEASE, UNSPECIFIED: ICD-10-CM

## 2023-02-23 DIAGNOSIS — R05.9 COUGH, UNSPECIFIED: ICD-10-CM

## 2023-02-23 DIAGNOSIS — R06.00 DYSPNEA, UNSPECIFIED: ICD-10-CM

## 2023-02-23 PROCEDURE — 99203 OFFICE O/P NEW LOW 30 MIN: CPT

## 2023-02-23 RX ORDER — PANTOPRAZOLE SODIUM 20 MG/1
20 TABLET, DELAYED RELEASE ORAL DAILY
Refills: 0 | Status: DISCONTINUED | COMMUNITY
End: 2023-02-23

## 2023-02-23 RX ORDER — SODIUM ZIRCONIUM CYCLOSILICATE 10 G/10G
10 POWDER, FOR SUSPENSION ORAL TWICE DAILY
Qty: 4 | Refills: 0 | Status: DISCONTINUED | COMMUNITY
Start: 2022-11-16 | End: 2023-02-23

## 2023-02-23 NOTE — CONSULT LETTER
[Dear  ___] : Dear  [unfilled], [Consult Letter:] : I had the pleasure of evaluating your patient, [unfilled]. [Please see my note below.] : Please see my note below. [Consult Closing:] : Thank you very much for allowing me to participate in the care of this patient.  If you have any questions, please do not hesitate to contact me. [Sincerely,] : Sincerely, [DrLiudmila  ___] : Dr. LARRY

## 2023-02-23 NOTE — PHYSICAL EXAM
[No Acute Distress] : no acute distress [Normal Oropharynx] : normal oropharynx [Normal Appearance] : normal appearance [No Neck Mass] : no neck mass [Normal Rate/Rhythm] : normal rate/rhythm [Normal S1, S2] : normal s1, s2 [No Resp Distress] : no resp distress [Clear to Auscultation Bilaterally] : clear to auscultation bilaterally

## 2023-03-16 LAB — DEPRECATED D DIMER PPP IA-ACNC: <150 NG/ML DDU

## 2023-03-16 NOTE — DISCUSSION/SUMMARY
[FreeTextEntry1] : Assess\par \par OBRIEN\par No obstruction or restriction\par Cardiac function not abnormal\par No metabolic acidosis to drive hyperventilation\par No upper airway obstriction\par No significant anemia\par Suspect early diaphragm dysfunction and Neurologic issue\par \par Plan\par \par CT\par D-dimer\par One Month FU \par Neuro CAMPUZANO

## 2023-03-16 NOTE — HISTORY OF PRESENT ILLNESS
[TextBox_4] : 2/23/23\par Seen at [facility] on 2/15/23\par SOB X 7-8 months\par OBRIEN\par DC Cig in 2010\par Autoimmun hepatitis, pancreatitis, thyroid nodules, CKD III; Anemia and adrenal insufficiency\par Son With CF\par Cardio CAMPUZANO unrevealing (Echo normal-Nov 22; CTA-CAC=0 also Nov 22)\par Rx: hydrocortisone, Synthroid, Fish Oil, \par Allergy: Biaxin=>N/V/D\par 7 pack year smoker - DC 4/18/2010\par CXR on 10/16/22: WNL\par PFT: No obstruction or restriction. SAD. Mildly reduced DLCO\par

## 2023-03-28 ENCOUNTER — RX RENEWAL (OUTPATIENT)
Age: 48
End: 2023-03-28

## 2023-03-31 ENCOUNTER — NON-APPOINTMENT (OUTPATIENT)
Age: 48
End: 2023-03-31

## 2023-04-04 ENCOUNTER — APPOINTMENT (OUTPATIENT)
Dept: HEPATOLOGY | Facility: CLINIC | Age: 48
End: 2023-04-04

## 2023-04-06 ENCOUNTER — APPOINTMENT (OUTPATIENT)
Dept: HEPATOLOGY | Facility: CLINIC | Age: 48
End: 2023-04-06

## 2023-04-18 ENCOUNTER — APPOINTMENT (OUTPATIENT)
Dept: PULMONOLOGY | Facility: CLINIC | Age: 48
End: 2023-04-18

## 2023-05-02 ENCOUNTER — APPOINTMENT (OUTPATIENT)
Dept: CARDIOLOGY | Facility: CLINIC | Age: 48
End: 2023-05-02

## 2023-05-04 ENCOUNTER — APPOINTMENT (OUTPATIENT)
Dept: ENDOCRINOLOGY | Facility: CLINIC | Age: 48
End: 2023-05-04
Payer: COMMERCIAL

## 2023-05-04 VITALS — DIASTOLIC BLOOD PRESSURE: 60 MMHG | SYSTOLIC BLOOD PRESSURE: 118 MMHG | HEART RATE: 79 BPM | OXYGEN SATURATION: 99 %

## 2023-05-04 VITALS — WEIGHT: 142 LBS | BODY MASS INDEX: 26.81 KG/M2 | HEIGHT: 61 IN

## 2023-05-04 DIAGNOSIS — N18.30 CHRONIC KIDNEY DISEASE, STAGE 3 UNSPECIFIED: ICD-10-CM

## 2023-05-04 DIAGNOSIS — M81.0 AGE-RELATED OSTEOPOROSIS W/OUT CURRENT PATHOLOGICAL FRACTURE: ICD-10-CM

## 2023-05-04 PROCEDURE — 99214 OFFICE O/P EST MOD 30 MIN: CPT

## 2023-05-04 NOTE — ASSESSMENT
[FreeTextEntry1] : HYpoT /autoimmune thyroiditis \par pt euthyroid clinically and biochemically.\par cont Lt4 100 mcg qd \par \par Sourav disease : cont HC 10 mg am and 5 mg pm \par intermittently she takes steroids for hepatitis. \par take alert bracelet \par check 21 OHydase Ab \par \par NTMNG : no compressive sx   \par FNA nondiagnostic repeatedly of L dominant nodule bc heavily calcified. \par check neck US and will call pt with results.  \par \par abnormal CBC: seen by hematology and no major abnormalities/.  \par \par vitamin d defic: cont  vitamin d to 2000 u qd \par check levels \par \par overweighT: Encouraged more exercise walking 30 min 3 x week\par \par osteoporosis : \par continue calcium 500 mg BID \par check DXA \par

## 2023-05-08 ENCOUNTER — OUTPATIENT (OUTPATIENT)
Dept: OUTPATIENT SERVICES | Facility: HOSPITAL | Age: 48
LOS: 1 days | End: 2023-05-08

## 2023-05-08 ENCOUNTER — APPOINTMENT (OUTPATIENT)
Dept: ULTRASOUND IMAGING | Facility: CLINIC | Age: 48
End: 2023-05-08
Payer: COMMERCIAL

## 2023-05-08 DIAGNOSIS — Z98.891 HISTORY OF UTERINE SCAR FROM PREVIOUS SURGERY: Chronic | ICD-10-CM

## 2023-05-08 DIAGNOSIS — E04.2 NONTOXIC MULTINODULAR GOITER: ICD-10-CM

## 2023-05-08 DIAGNOSIS — Z98.890 OTHER SPECIFIED POSTPROCEDURAL STATES: Chronic | ICD-10-CM

## 2023-05-08 PROCEDURE — 76536 US EXAM OF HEAD AND NECK: CPT | Mod: 26

## 2023-06-26 ENCOUNTER — APPOINTMENT (OUTPATIENT)
Dept: HEPATOLOGY | Facility: CLINIC | Age: 48
End: 2023-06-26
Payer: COMMERCIAL

## 2023-06-26 ENCOUNTER — LABORATORY RESULT (OUTPATIENT)
Age: 48
End: 2023-06-26

## 2023-06-26 VITALS
RESPIRATION RATE: 16 BRPM | WEIGHT: 143 LBS | HEART RATE: 77 BPM | HEIGHT: 61 IN | BODY MASS INDEX: 27 KG/M2 | TEMPERATURE: 98 F | SYSTOLIC BLOOD PRESSURE: 131 MMHG | DIASTOLIC BLOOD PRESSURE: 85 MMHG | OXYGEN SATURATION: 98 %

## 2023-06-26 DIAGNOSIS — K75.4 AUTOIMMUNE HEPATITIS: ICD-10-CM

## 2023-06-26 PROCEDURE — 99214 OFFICE O/P EST MOD 30 MIN: CPT

## 2023-06-26 NOTE — HISTORY OF PRESENT ILLNESS
[Needlestick Exposure] : no needlestick exposure [Infected Sexual Partner] : no infected sexual partner [IV Drug Use] : no IV drug use [Tattoo] : no tattoos [Body Piercing] : no body piercing [Hemodialysis] : no hemodialysis [Transfusion before 1992] : no transfusion before 1992 [Transplant before 1992] : no transplant before 1992 [Alcohol Abuse] : no alcohol abuse [Autoimmune Disorder] : autoimmune disorder [Household Contact to HBV] : no household contact to HBV [Travel to Endemic Area] : no travel to an endemic area [Occupational Exposure] : no occupational exposure [Cocaine Use] : no cocaine use [de-identified] : 6/26/23 here for follow, last seen by Dr. Gaming on 11/15/22. States that she had multiple visit cancellations by our office. 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 No recent BW.\par \par 11/15/22: returns after advised to stop budesonide and start prednisone 5 mg/d x 1 month, then 4 mg/d, and to take up more salt. I spoke with her on 10/28 after she was hospitalized at [address] on 9/29/22 with nausea and vomiting and upper abdom. pain where she was told she had a flare of autoimmune hepatitis. However, as her LFTs were reportedly normal, it was adrenal insufficiency - this happened while on prednisone 5 mg/d, and I had told her to increase to 10 mg/d. BW not done.\par [facility] records: on admission, excrutiating upper abdom. pain, which resolved within a day, lipase was 94, EUS showed normal pancreas. Hyponatremia Na 131, K high 4.7, PAZ creat 1.6. Was given 8 bags of fluid at least. EGD/EUS: no ulcer, see below. She was also given an antibiotic for a UTI, but denied urinary symptoms. She felt dizzy 2d ago and her BP at home 98/76 mmHg. Drank chicken soup, felt normal after 2 hrs. Tolerating pred 10 mg, after initial difficulty falling asleep (took Benadryl at night)\par Exam: 137 lbs, BMI 25.9\par \par - 9/20/22: Pt. consented to audio and visual telehealth visit. The patient was at home, I was in the office at [facility].\par She took hydrocortisone 5 mg qAM during her visit in [place], where it was very hot, then hospitalized in late July with dehydration and PAZ, started again on budesonide 3 mg qd. Had BW on 9/14 - creat improved 1.19 from past. Has not measured BP. She tries to eat low-salt diet. She started drinking Gatorade 2 weeks ago instead of tea and soda. Her son with cystic fibrosis was told to put extra salt in his Gatorade. \par \par - 6/14/22: returns after 4 months instead of 6 weeks, and hydrocortisone taper every 2 weeks to 7.5 mg/d. She reduced to 5 mg/d 6 weeks after the last visit. Feels better than in past, does not need to sleep during the day anymore. \par Exam: weight 140 lbs, BMI 26.5, still has Cushingoid facies with full cheeks. No buffalo hump, no abdo. striae distensae. \par \par - 2/1/22: returns after switching to methylprednisolone 10-0-5 mg/d. Was unable to taper as she developed COVID. Had antiviral infusion in the ED, BP at the time 70s after she had stopped the steroid. I had recommended to resume. Now feels at baseline again. Has difficulty sleeping at night, still tired during the day. Tells me that she was diagnosed with pernicious anemia in July 2021 and has been getting monthly VitB12 injections.\par Exam: body weight stable, but Cushingoid facies today - much cotton cheeks\par \par - 1/13/22: has been taking prednisone 5 mg/d, was unable to tolerate lower doses - feels terrible when she tries. Does not need to sleep in AM anymore, but at 8pm, sleeps 8-10 hrs, in the past only 6-8 hrs. Had recent bloodwork that showed PAZ, improved after repeat and drinking more water. Does not eat low-salt diet. Meds: no change.\par \par - 11/9/21: returns after 3 months instead of one. Taking prednisone 5 mg/d, after tapering from 9 mg/d in 1 mg/d decrements for 2 weeks each. At 4 mg/d, she had nausea three times after 3-4 days, and has been taking 5 m/d for the last 3 weeks. She saw another hematologist and was given VitB12 shots for pernicious anemia for the last 3 weeks- has not felt a difference. She does not have to sleep during the day anymore, but does feel tired. Sleeps 9 hrs/day.\par \par - 6/24/21: returns after weekly taper of prednisone from 15 mg/d to 5 mg/d over 5 weeks after March visit, repeat bloodwork in April, hospitalization in May for 3 days at [facility] with generalized abdominal pain, nausea and vomiting, diagnosed with mild acute pancreatitis. CT report reviewed: potential mild prominence of prox and mid pancreas. Lipase 5/18: 689 elevated, LFTs normal, hyponatremia Na 130 and hyperkalemia K 5.1. Treated with morphine x2. I then increased prednisone to 10 mg/d for adrenal insufficiency, but nausea didn't improve much; then EGD 5/25 - I prescribed pantoprazole 20 mg/d for gastritis with erosion. She had recurrent pain 2-3 days after the EGD and nausea & vomiting again but stayed at home for two weeks feeling like crap until hospitalization 6/14-17, told that it was gastritis and pantoprazole was increased to 40 mg/d. Hospital records (Pt's cell phone): labs 6/15: hypothermia 36.1 C, WBC 29.9 at 1am with 62% neutros and 1.1% immature grans (elevated), then 20 at 7am, Na 130, K 5.1 - hyponatremia and hyperkalemia, bili 2.2 elevated. No f/u labs available. CT 6/15 report: CCY, no cause of pain. She had hyperkalemia 5.1 (<=4.5) and PAZ creat 1.8. She was given morphine again x 2. Her level of energy has been ok throughout all of this. \par Prednisone was not changed - taking 10 mg/d. Sleeps better - from 21:00-6:00. No nausea recently. \par \par - 3/18/21: returns after repeat labs, done on 3/3, and seeing Dr. Mayer. Taking budesonide. Feels more fatigue than when steroid started at higher dose, goes to bed at 8pm, sleeps until she gets her kids out of the house, then sleeps another hour or so. \par \par - 2/4/21: returns after bloodwork last visit, UA, and fibroscan. Taking budesonide 3 mg/d, feels normal. EGD not done.\par \par - 1/14/21: returns after 2 months. Recent bloodwork not done. She postponed the EGD close to her home until after her 2nd COVID shot.\par \par - Ms. HORNE is a 45 year old woman with h/o acute pancreatitides (about 8-10 since age 10), hypothyroidism, osteoporosis, h/o gastritis (hospitalized 2/2020), CCY, who  was diagnosed with AIH by her gastroenterologist at the time, Dr. Hood, after she developed dark urine, jaundice, fatigue and some vomiting, and was found to have elevated LFTs with bilirubin 7.2, AST//763, ALP, pos. anti-actin 69 U, MELISSA+,  based on a liver biopsy. She saw Dr. Naranjo at our office for a second opinion after she had developed side-effects to treatment with prednisone, initially 60 mg/d. LFTs had improved. Dr. Naranjo ordered bloodwork, planned to review the liver biopsy, and agreed with tapering prednisone, and recommended the addition of azathioprine. \par The patient did not follow-up at that time. Labs form 9/2016 show AST/ALT mildly elevated, and negative MELISSA, ASMA and normal IgG of 759. She was treated with prednisone for 6 months, then followed with Dr. Andra Morton at [facility] and wishes to transfer care here as it is closer to her home.\par She developed acute pancreatitis to azathioprine and was treated with budesonide 3 mg/d until 8/2020. She developed persistent nausea and vomiting about a week later that lasted for more than three weeks, and RUQ pain, and extreme exhaustion and fatigue. Her gastroenterologist found elevated AST/ALT 87/78, K 4.3, Na 136, Creat 1.33, BUN 20, and resumed budesonide 9 mg/d on 9/29/20. She rapidly improved and has been feeling normally recently. Recent LFTs were normal as below. \par \par Weight hx: 138 lbs, BMI 26.1 after 10 lbs weight loss in 6 weeks by dieting. Max. weight was 148 lbs. Longstanding overweight 130-140 lbs.\par Alcohol hx: does not drink, never did, because of her pancreatitides. 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 Biopsied. Dilated lacteals duodenum, biopsied. PPI prescribed. Path: chronic active gastritis with intestinal metaplasia. Duodenum normal. \par - 1/29/21 DEXA scan (scanned): normal. \par - 1/19/21 fibroscan: 3.8 kPa, 217 dB/m - stage 0 fibrosis, stage 0 steatosis. Interpretation corrected, also values and interpretation in chart note from 1/19/21.\par - 10/12/20 MRI abdomen wwo ([facility]): normal liver morphology. Vessels patent. Coronary varices, suggestive of portal HTN.\par - 8/4/16 liver biopsy ([facility]; scanned): moderate to marked inflammatory infiltrate involving portal areas with interface activity and extends into lobules. [...] Lymphocytes, plasma cells, eosinophils, some neutrophils., including rare neutrophils in small bile ducts. Lobular cholestasis is also present. No evidence of fibrosis. [...]not entirely characteristic, given pos. MELISSA, AMA, compatible with AIH.

## 2023-06-26 NOTE — ASSESSMENT
[FreeTextEntry1] : Ms. HORNE is a 47 year old woman with autoimmune hepatitis, cystic fibrosis carrier, VitB12 deficiency 7/2021 getting monthly injections, h/o acute pancreatitides (about 8-10 since age 10), hypothyroidism, osteoporosis, h/o gastritis (hospitalized 2/2020), CCY. AIH diagnosed in 2016 by her gastroenterologist at the time, Dr. Hood, initially bilirubin 7.2, AST//763, normal IgG 759, pos. anti-actin 69 U, MELISSA+,  and typical findings on liver biopsy in 2016 (scanned); referred in 11/2020, after she had acute episode of pancreatitis, followed by nausea, vomiting, severe fatigue, and AST/ALT elevation to ~80 U/L, which all resolved after budesonide was resumed in 9/2020. She has been taking hydrocortisone 10 mg qAM, 5 mg qPM since Nov 2022.\par \par - adrenal insufficiency after years of steroid use since 2016, unable to taper and stop. Adrenal glands appear normal on imaging. Hospitalized repeatedly in 2021, and 2022, 10/2022 while on prednisone 5 mg/d, and in 9/2022 while on hydrocortisone 5 mg/d after a vacation in [place] where it was very hot. Stopped low-salt diet in fall of 2022, also drinks Gatorade instead of water.\par - AIH; no fibrosis or steatosis seen on fibroscan 1/2021\par - AST/ALT have been normal since first visit. Bilirubin once mildly increased to 1.6, possibly due to Gilbert's disease.\par - fatty liver seen on US 3/2021, but not on MRI. Is overweight BMI 27\par \par - abdominal varices on MRI 10/2020, but no esophageal varices on EGD 5/25/21, did have one gastric erosion. H. pylori negative. \par \par - no osteopenia or osteoporosis seen on DEXA scan 1/2021\par - overweight, HbA1c normal\par - CKD 3, saw Dr. Mayer in past, and another nephrologist in 2022, was told that she had small kidneys.\par - gastritis and medium hiatal hernia on EGD/EUS at [facility] 9/2022, on pantoprazole\par \par - likely intolerant of azathioprine - acute pancreatitis after initiation\par - immune to hepatitis B due to vaccination, not immune to hepatitis A\par \par Plan:\par - hypoaldosteronism: C/w hydrocortisone 10 mg qAM, 5 mg qPM and will discuss her case with Dr. Gaming. \par - BW today and will call to review.

## 2023-06-27 ENCOUNTER — NON-APPOINTMENT (OUTPATIENT)
Age: 48
End: 2023-06-27

## 2023-06-28 ENCOUNTER — NON-APPOINTMENT (OUTPATIENT)
Age: 48
End: 2023-06-28

## 2023-06-28 LAB
ALBUMIN SERPL ELPH-MCNC: 4.5 G/DL
ALP BLD-CCNC: 84 U/L
ALT SERPL-CCNC: 21 U/L
ANION GAP SERPL CALC-SCNC: 13 MMOL/L
AST SERPL-CCNC: 22 U/L
BILIRUB SERPL-MCNC: 0.7 MG/DL
BUN SERPL-MCNC: 24 MG/DL
CALCIUM SERPL-MCNC: 9.6 MG/DL
CHLORIDE SERPL-SCNC: 102 MMOL/L
CO2 SERPL-SCNC: 20 MMOL/L
CREAT SERPL-MCNC: 1.58 MG/DL
EGFR: 41 ML/MIN/1.73M2
IGG SER QL IEP: 961 MG/DL
INR PPP: 0.9 RATIO
POTASSIUM SERPL-SCNC: 5.3 MMOL/L
PROT SERPL-MCNC: 7.3 G/DL
PT BLD: 10.5 SEC
SODIUM SERPL-SCNC: 134 MMOL/L

## 2023-07-18 ENCOUNTER — APPOINTMENT (OUTPATIENT)
Dept: HEPATOLOGY | Facility: CLINIC | Age: 48
End: 2023-07-18

## 2023-07-25 NOTE — ED PROVIDER NOTE - CARE PLAN
DIANNA Stringer DO     Principal Discharge DX:	Diabetic foot ulcer   Principal Discharge DX:	PAZ (acute kidney injury)

## 2023-08-03 NOTE — PATIENT PROFILE ADULT - NSPROGENSOURCEINFO_GEN_A_NUR
Home today. Awake and alert. Respirations easy. Discharge criteria met per luis fernando score. Sunglasses in place. Tolerating juice and crackers. Iv removed, site intact. No bleeding or swelling noted. Discharge instructions given. Verbalized understanding. No pain. Vitals stable. To lobby per wheelchair.   patient

## 2023-11-09 ENCOUNTER — APPOINTMENT (OUTPATIENT)
Dept: ENDOCRINOLOGY | Facility: CLINIC | Age: 48
End: 2023-11-09
Payer: COMMERCIAL

## 2023-11-09 VITALS
OXYGEN SATURATION: 98 % | WEIGHT: 144 LBS | BODY MASS INDEX: 27.19 KG/M2 | DIASTOLIC BLOOD PRESSURE: 70 MMHG | SYSTOLIC BLOOD PRESSURE: 116 MMHG | HEART RATE: 88 BPM | HEIGHT: 61 IN

## 2023-11-09 DIAGNOSIS — E66.3 OVERWEIGHT: ICD-10-CM

## 2023-11-09 PROCEDURE — 99214 OFFICE O/P EST MOD 30 MIN: CPT

## 2023-11-14 ENCOUNTER — LABORATORY RESULT (OUTPATIENT)
Age: 48
End: 2023-11-14

## 2023-11-17 DIAGNOSIS — D64.9 ANEMIA, UNSPECIFIED: ICD-10-CM

## 2023-11-22 DIAGNOSIS — D50.9 IRON DEFICIENCY ANEMIA, UNSPECIFIED: ICD-10-CM

## 2023-11-22 LAB
ALBUMIN SERPL ELPH-MCNC: 4.3 G/DL
ALP BLD-CCNC: 96 U/L
ALT SERPL-CCNC: 16 U/L
ANION GAP SERPL CALC-SCNC: 13 MMOL/L
AST SERPL-CCNC: 21 U/L
BASOPHILS # BLD AUTO: 0.11 K/UL
BASOPHILS NFR BLD AUTO: 0.9 %
BILIRUB SERPL-MCNC: 0.6 MG/DL
BUN SERPL-MCNC: 20 MG/DL
CALCIUM SERPL-MCNC: 9.1 MG/DL
CHLORIDE SERPL-SCNC: 101 MMOL/L
CHOLEST SERPL-MCNC: 220 MG/DL
CO2 SERPL-SCNC: 21 MMOL/L
CREAT SERPL-MCNC: 1.31 MG/DL
EGFR: 51 ML/MIN/1.73M2
EOSINOPHIL # BLD AUTO: 0.41 K/UL
EOSINOPHIL NFR BLD AUTO: 3.4 %
FERRITIN SERPL-MCNC: 10 NG/ML
GLUCOSE SERPL-MCNC: 87 MG/DL
HCT VFR BLD CALC: 34.3 %
HDLC SERPL-MCNC: 50 MG/DL
HGB BLD-MCNC: 9.9 G/DL
IRON SATN MFR SERPL: 4 %
IRON SERPL-MCNC: 18 UG/DL
LDLC SERPL CALC-MCNC: 116 MG/DL
LYMPHOCYTES # BLD AUTO: 6.71 K/UL
LYMPHOCYTES NFR BLD AUTO: 55.2 %
MAN DIFF?: NORMAL
MCHC RBC-ENTMCNC: 21.8 PG
MCHC RBC-ENTMCNC: 28.9 GM/DL
MCV RBC AUTO: 75.4 FL
MONOCYTES # BLD AUTO: 0.73 K/UL
MONOCYTES NFR BLD AUTO: 6 %
NEUTROPHILS # BLD AUTO: 4.2 K/UL
NEUTROPHILS NFR BLD AUTO: 34.5 %
NONHDLC SERPL-MCNC: 171 MG/DL
PLATELET # BLD AUTO: 650 K/UL
POTASSIUM SERPL-SCNC: 5.2 MMOL/L
PROT SERPL-MCNC: 7.4 G/DL
RBC # BLD: 4.55 M/UL
RBC # FLD: 17.2 %
SODIUM SERPL-SCNC: 136 MMOL/L
T4 FREE SERPL-MCNC: 1.7 NG/DL
TIBC SERPL-MCNC: 468 UG/DL
TRIGL SERPL-MCNC: 316 MG/DL
TSH SERPL-ACNC: 0.58 UIU/ML
UIBC SERPL-MCNC: 450 UG/DL
WBC # FLD AUTO: 12.16 K/UL

## 2023-11-22 RX ORDER — CHLORHEXIDINE GLUCONATE 4 %
325 (65 FE) LIQUID (ML) TOPICAL
Qty: 90 | Refills: 1 | Status: ACTIVE | COMMUNITY
Start: 2023-11-22 | End: 1900-01-01

## 2023-12-22 ENCOUNTER — RX RENEWAL (OUTPATIENT)
Age: 48
End: 2023-12-22

## 2024-01-19 ENCOUNTER — RX RENEWAL (OUTPATIENT)
Age: 49
End: 2024-01-19

## 2024-02-23 ENCOUNTER — RX RENEWAL (OUTPATIENT)
Age: 49
End: 2024-02-23

## 2024-04-25 ENCOUNTER — RX RENEWAL (OUTPATIENT)
Age: 49
End: 2024-04-25

## 2024-05-21 ENCOUNTER — RX RENEWAL (OUTPATIENT)
Age: 49
End: 2024-05-21

## 2024-05-21 RX ORDER — LEVOTHYROXINE SODIUM 0.1 MG/1
100 TABLET ORAL
Qty: 90 | Refills: 1 | Status: ACTIVE | COMMUNITY
Start: 2019-05-06 | End: 1900-01-01

## 2024-05-23 ENCOUNTER — APPOINTMENT (OUTPATIENT)
Dept: ENDOCRINOLOGY | Facility: CLINIC | Age: 49
End: 2024-05-23

## 2024-06-03 ENCOUNTER — APPOINTMENT (OUTPATIENT)
Dept: ORTHOPEDIC SURGERY | Facility: CLINIC | Age: 49
End: 2024-06-03
Payer: COMMERCIAL

## 2024-06-03 VITALS — BODY MASS INDEX: 28.32 KG/M2 | HEIGHT: 61 IN | WEIGHT: 150 LBS

## 2024-06-03 DIAGNOSIS — M62.830 MUSCLE SPASM OF BACK: ICD-10-CM

## 2024-06-03 DIAGNOSIS — S89.91XA UNSPECIFIED INJURY OF RIGHT LOWER LEG, INITIAL ENCOUNTER: ICD-10-CM

## 2024-06-03 PROCEDURE — 73564 X-RAY EXAM KNEE 4 OR MORE: CPT | Mod: RT

## 2024-06-03 PROCEDURE — 99204 OFFICE O/P NEW MOD 45 MIN: CPT

## 2024-06-03 NOTE — CONSULT LETTER
[Dear  ___] : Dear  [unfilled], [Consult Letter:] : I had the pleasure of evaluating your patient, [unfilled]. [Please see my note below.] : Please see my note below. [Sincerely,] : Sincerely, [FreeTextEntry3] : Dr. Garcia Granger

## 2024-06-03 NOTE — DISCUSSION/SUMMARY
[de-identified] : We had a thorough discussion regarding the nature of her pain, the pathophysiology, as well as all treatment options. Based on clinical exam, MRI and radiograph findings they have a right ACL tear. I discussed operative and non-operative treatment modalities. Patient was given prescription of formal physical therapy that she will perform 2x/wk for 6-8 wks. She should continue wearing her brace. All questions were answered and the patient verbalized understanding. The patient is in agreement with this treatment plan.

## 2024-06-03 NOTE — HISTORY OF PRESENT ILLNESS
[Worsening] : worsening [___ days] : [unfilled] day(s) ago [de-identified] : ERROL HORNE is a 48 year female being seen for initial visit R knee pain. She states on 5/23/2024, she jumped over a hope chest and onto her bed when she felt a pop in her right knee. She states she felt immediate pain and instability. She states she went to [facility] the next day. She was referred to [facility] in [place] for further evaluation. She presents today with periods of instability and pain in her knee. She notes she has been wearing a brace which she notes decreases her pain. She presents today with an MRI of her right knee from [facility].

## 2024-06-03 NOTE — ADDENDUM
[FreeTextEntry1] : Documented by Rae Terry acting as a scribe for Dr. Granger on 06/03/2024. All medical record entries made by the Scribe were at my, Dr. Granger's, direction and personally dictated by me on 06/03/2024. I have reviewed the chart and agree that the record accurately reflects my personal performance of the history, physical exam, procedure and imaging.

## 2024-06-03 NOTE — PHYSICAL EXAM
[de-identified] : General: Well appearing, no acute distress Neurologic: A&Ox3, No focal deficits Head: NCAT without abrasions, lacerations, or ecchymosis to head, face, or scalp Eyes: No scleral icterus, no gross abnormalities Respiratory: Equal chest wall expansion bilaterally, no accessory muscle use Lymphatic: No lymphadenopathy palpated Skin: Warm and dry Psychiatric: Normal mood and affect  Examination of the right knee reveals a moderate effusion, no erythema or ecchymosis. There are no leg length discrepancies appreciated. The right knee is slightly larger than the left. The patient's range of motion is from 0-70 limited by swelling. The patient has no pain with patella mobility or apprehension. The patient displays tenderness to palpation in the medial joint line. There is no patella facet tenderness. The patient has a 4.5 out of 5 strength resisted straight leg raising as well as knee flexion and extension. The knee demonstrates 2+ laxity to Lachman testing, as well as anterior drawer. Unable to tolerate a pivot shift. Stable to posterior drawer. There is no valgus or varus instability. The patient has pain with Louie and Grind Testing. The calf and thigh are soft, supple and nontender. The patient is grossly neurovascularly intact distally. [de-identified] : DATE OF EXAM: 05/28/2024 MRI-RIGHT KNEE NON CONTRAST  IMPRESSION: Full-thickness anterior cruciate ligament tear.  4 views of R knee were performed today and available for me to review. Results were discussed with the patient. They demonstrate no f/x, dislocation or other deformity.

## 2024-06-19 ENCOUNTER — LABORATORY RESULT (OUTPATIENT)
Age: 49
End: 2024-06-19

## 2024-06-20 LAB
25(OH)D3 SERPL-MCNC: 27 NG/ML
ALBUMIN SERPL ELPH-MCNC: 4.2 G/DL
ALP BLD-CCNC: 107 U/L
ALT SERPL-CCNC: 17 U/L
ANION GAP SERPL CALC-SCNC: 14 MMOL/L
AST SERPL-CCNC: 18 U/L
BILIRUB SERPL-MCNC: 0.6 MG/DL
BUN SERPL-MCNC: 17 MG/DL
CALCIUM SERPL-MCNC: 9.5 MG/DL
CHLORIDE SERPL-SCNC: 99 MMOL/L
CHOLEST SERPL-MCNC: 231 MG/DL
CO2 SERPL-SCNC: 21 MMOL/L
CREAT SERPL-MCNC: 1.31 MG/DL
EGFR: 51 ML/MIN/1.73M2
GLUCOSE SERPL-MCNC: 85 MG/DL
HDLC SERPL-MCNC: 58 MG/DL
LDLC SERPL CALC-MCNC: 131 MG/DL
NONHDLC SERPL-MCNC: 172 MG/DL
POTASSIUM SERPL-SCNC: 5 MMOL/L
PROT SERPL-MCNC: 7.4 G/DL
SODIUM SERPL-SCNC: 134 MMOL/L
T4 FREE SERPL-MCNC: 1.6 NG/DL
TRIGL SERPL-MCNC: 236 MG/DL
TSH SERPL-ACNC: 0.89 UIU/ML

## 2024-06-24 ENCOUNTER — RX RENEWAL (OUTPATIENT)
Age: 49
End: 2024-06-24

## 2024-06-24 RX ORDER — HYDROCORTISONE 5 MG/1
5 TABLET ORAL
Qty: 90 | Refills: 5 | Status: ACTIVE | COMMUNITY
Start: 2022-11-15 | End: 1900-01-01

## 2024-06-26 ENCOUNTER — APPOINTMENT (OUTPATIENT)
Dept: ENDOCRINOLOGY | Facility: CLINIC | Age: 49
End: 2024-06-26
Payer: COMMERCIAL

## 2024-06-26 VITALS
OXYGEN SATURATION: 98 % | HEART RATE: 70 BPM | RESPIRATION RATE: 16 BRPM | DIASTOLIC BLOOD PRESSURE: 76 MMHG | TEMPERATURE: 98 F | HEIGHT: 61 IN | SYSTOLIC BLOOD PRESSURE: 120 MMHG | WEIGHT: 155 LBS | BODY MASS INDEX: 29.27 KG/M2

## 2024-06-26 DIAGNOSIS — E03.9 HYPOTHYROIDISM, UNSPECIFIED: ICD-10-CM

## 2024-06-26 DIAGNOSIS — E04.2 NONTOXIC MULTINODULAR GOITER: ICD-10-CM

## 2024-06-26 DIAGNOSIS — E78.00 PURE HYPERCHOLESTEROLEMIA, UNSPECIFIED: ICD-10-CM

## 2024-06-26 DIAGNOSIS — E27.40 UNSPECIFIED ADRENOCORTICAL INSUFFICIENCY: ICD-10-CM

## 2024-06-26 DIAGNOSIS — E55.9 VITAMIN D DEFICIENCY, UNSPECIFIED: ICD-10-CM

## 2024-06-26 PROCEDURE — 99215 OFFICE O/P EST HI 40 MIN: CPT

## 2024-06-26 RX ORDER — SODIUM BICARBONATE 650 MG/1
650 TABLET ORAL
Refills: 0 | Status: ACTIVE | COMMUNITY

## 2024-06-26 RX ORDER — GABAPENTIN 400 MG/1
400 CAPSULE ORAL
Refills: 0 | Status: ACTIVE | COMMUNITY

## 2024-07-22 ENCOUNTER — APPOINTMENT (OUTPATIENT)
Dept: ORTHOPEDIC SURGERY | Facility: CLINIC | Age: 49
End: 2024-07-22
Payer: COMMERCIAL

## 2024-07-22 VITALS — WEIGHT: 155 LBS | BODY MASS INDEX: 29.27 KG/M2 | HEIGHT: 61 IN

## 2024-07-22 DIAGNOSIS — S89.91XA UNSPECIFIED INJURY OF RIGHT LOWER LEG, INITIAL ENCOUNTER: ICD-10-CM

## 2024-07-22 PROCEDURE — 99213 OFFICE O/P EST LOW 20 MIN: CPT

## 2024-07-22 NOTE — HISTORY OF PRESENT ILLNESS
[de-identified] : ERROL HORNE is a 48 year female being seen for follow up visit R knee pain. She states on 5/23/2024, she jumped over a hope chest and onto her bed when she felt a pop in her right knee. She states she felt immediate pain and instability. Of note, she has been following with physical therapy since about 6/10/24. States her range of motion has improved however she is still experiencing pain. Patient works as an aide at a school.

## 2024-07-22 NOTE — DISCUSSION/SUMMARY
[de-identified] : We had a thorough discussion regarding the nature of her pain, the pathophysiology, as well as all treatment options. I discussed operative and non-operative treatment modalities. Conservative measures of treatment include rest until asymptomatic, activity avoidance, NSAID's PRN, application to ice to the area 2-3x daily for 20 minutes, with gradual return to activities. Patient was given prescription of formal physical therapy that she will perform 2x/wk for 6-8 wks. All questions were answered and the patient verbalized understanding. The patient is in agreement with this treatment plan. Patient will follow up in 6-8 wks for repeat clinical assessment.

## 2024-07-22 NOTE — ADDENDUM
[FreeTextEntry1] : Documented by Leola Pack acting as a scribe for Dr. Granger on 07/22/2024. All medical record entries made by the Scribe were at my direction and personally dictated by me on 07/22/2024. I have reviewed the chart and agree that the record accurately reflects my personal performance of the history, physical exam, procedure and imaging.

## 2024-09-09 ENCOUNTER — APPOINTMENT (OUTPATIENT)
Dept: ORTHOPEDIC SURGERY | Facility: CLINIC | Age: 49
End: 2024-09-09
Payer: COMMERCIAL

## 2024-09-09 DIAGNOSIS — S89.91XD UNSPECIFIED INJURY OF RIGHT LOWER LEG, SUBSEQUENT ENCOUNTER: ICD-10-CM

## 2024-09-09 PROCEDURE — 20611 DRAIN/INJ JOINT/BURSA W/US: CPT | Mod: RT

## 2024-09-09 PROCEDURE — 99214 OFFICE O/P EST MOD 30 MIN: CPT | Mod: 25

## 2024-09-09 NOTE — DISCUSSION/SUMMARY
[de-identified] : We had a thorough discussion regarding the nature of her pain, the pathophysiology, as well as all treatment options. I discussed operative and non-operative treatment modalities. Pt due to her acute pain elected for a corticosteroid injection at today's visit and tolerated the procedure well. She should take it easy for the next 2-3 days while icing the joint. All risks, benefits and alternatives to the proposed surgical procedure, Right knee arthroscopy wit ACL reconstruction, were discussed in great detail with the patient. Risks include but are not limited to pain, bleeding, infection, stiffness, medical complications (including DVT, PE, MI), and risks of anesthesia. The patient is not electing to proceed with a procedure yet. All questions were answered and the patient verbalized understanding. The patient is in agreement with this treatment plan.

## 2024-09-09 NOTE — ADDENDUM
[FreeTextEntry1] : Documented by Leola Pack acting as a scribe for Dr. Granger and Puma Sampson PA-C on 09/09/2024. All medical record entries made by the Scribe were at my, Dr. Granger, and Puma Sampson's, direction and personally dictated by me on 09/09/2024. I have reviewed the chart and agree that the record accurately reflects my personal performance of the history, physical exam, procedure and imaging.

## 2024-09-09 NOTE — PHYSICAL EXAM
[de-identified] : General: Well appearing, no acute distress Neurologic: A&Ox3, No focal deficits Head: NCAT without abrasions, lacerations, or ecchymosis to head, face, or scalp Eyes: No scleral icterus, no gross abnormalities Respiratory: Equal chest wall expansion bilaterally, no accessory muscle use Lymphatic: No lymphadenopathy palpated Skin: Warm and dry Psychiatric: Normal mood and affect  Examination of the right knee reveals moderate effusion, no erythema or ecchymosis. There are no leg length discrepancies appreciated. The patient's range of motion is from 0-100 degrees. Flexion limited by pain and swelling. The patient displays significant tenderness to palpation in the medial joint line. Tenderness over lateral joint line. There is no patella facet tenderness. The patient has a 4.5 out of 5 strength resisted straight leg raising as well as knee flexion and extension. The knee demonstrates 2+ laxity to Lachman testing, as well as POS anterior drawer with mild endpoint. Stable posterior drawer. Unable to tolerate a pivot shift. Stable to posterior drawer. There is grade 1 valgus instability, no varus instability. The patient has pain with Louie and Grind Testing. The calf and thigh are soft, supple and nontender. The patient is grossly neurovascularly intact distally. [de-identified] : No new imaging.

## 2024-09-09 NOTE — HISTORY OF PRESENT ILLNESS
[de-identified] : ERROL is a 48 year female here today follow up due to right knee pain. Of note, history of ACL tear. 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 Reports that she was doing well until this weekend after returning to work. Endorses medial and posterior knee. Patient additionally reports that her left knee started to bother her and she likely thinks this is due to compensation for R knee. Endorses instability.  .

## 2024-09-09 NOTE — PROCEDURE
[de-identified] : Injection: US guided Right Knee Joint  A discussion was had with the patient regarding this procedure and all questions were answered. All risks, benefits and alternatives were discussed. These included but were not limited to bleeding, infection, and allergic reaction. Alcohol was used to clean the skin, and chlorhexidine was used to sterilize and prep the area in the supero-lateral aspect of the Right knee. Ethyl chloride spray was then used as a topical anesthetic. A 22-gauge needle was used to inject 3 cc of 1% Xylocaine, 2 cc of 0.25% Bupivacaine, and 1 cc of 40 mg/mL Triamcinolone Acetonide into the Right knee. Ultrasound guidance was used for adequate placement of needle. A sterile bandage was then applied. The patient tolerated the procedure well and there were no complications. Post injection instructions were given.

## 2024-10-03 ENCOUNTER — LABORATORY RESULT (OUTPATIENT)
Age: 49
End: 2024-10-03

## 2024-10-16 ENCOUNTER — APPOINTMENT (OUTPATIENT)
Dept: ENDOCRINOLOGY | Facility: CLINIC | Age: 49
End: 2024-10-16
Payer: COMMERCIAL

## 2024-10-16 ENCOUNTER — NON-APPOINTMENT (OUTPATIENT)
Age: 49
End: 2024-10-16

## 2024-10-16 VITALS
OXYGEN SATURATION: 98 % | HEART RATE: 70 BPM | SYSTOLIC BLOOD PRESSURE: 130 MMHG | WEIGHT: 165.13 LBS | DIASTOLIC BLOOD PRESSURE: 80 MMHG | TEMPERATURE: 98 F | RESPIRATION RATE: 16 BRPM | HEIGHT: 61 IN | BODY MASS INDEX: 31.18 KG/M2

## 2024-10-16 DIAGNOSIS — E03.9 HYPOTHYROIDISM, UNSPECIFIED: ICD-10-CM

## 2024-10-16 DIAGNOSIS — E87.1 HYPO-OSMOLALITY AND HYPONATREMIA: ICD-10-CM

## 2024-10-16 DIAGNOSIS — E27.40 UNSPECIFIED ADRENOCORTICAL INSUFFICIENCY: ICD-10-CM

## 2024-10-16 DIAGNOSIS — E78.00 PURE HYPERCHOLESTEROLEMIA, UNSPECIFIED: ICD-10-CM

## 2024-10-16 DIAGNOSIS — R73.03 PREDIABETES.: ICD-10-CM

## 2024-10-16 DIAGNOSIS — E04.2 NONTOXIC MULTINODULAR GOITER: ICD-10-CM

## 2024-10-16 PROCEDURE — 99214 OFFICE O/P EST MOD 30 MIN: CPT

## 2024-10-16 RX ORDER — ATORVASTATIN CALCIUM 20 MG/1
20 TABLET, FILM COATED ORAL
Qty: 90 | Refills: 1 | Status: ACTIVE | COMMUNITY
Start: 2024-10-16 | End: 1900-01-01

## 2024-10-17 ENCOUNTER — APPOINTMENT (OUTPATIENT)
Dept: HEPATOLOGY | Facility: CLINIC | Age: 49
End: 2024-10-17

## 2024-10-18 RX ORDER — TIRZEPATIDE 2.5 MG/.5ML
2.5 INJECTION, SOLUTION SUBCUTANEOUS
Qty: 1 | Refills: 0 | Status: ACTIVE | COMMUNITY
Start: 2024-10-16

## 2024-10-21 ENCOUNTER — RX RENEWAL (OUTPATIENT)
Age: 49
End: 2024-10-21

## 2024-11-18 ENCOUNTER — APPOINTMENT (OUTPATIENT)
Dept: ORTHOPEDIC SURGERY | Facility: CLINIC | Age: 49
End: 2024-11-18
Payer: COMMERCIAL

## 2024-11-18 VITALS — HEIGHT: 61 IN | WEIGHT: 157 LBS | BODY MASS INDEX: 29.64 KG/M2

## 2024-11-18 DIAGNOSIS — S89.91XD UNSPECIFIED INJURY OF RIGHT LOWER LEG, SUBSEQUENT ENCOUNTER: ICD-10-CM

## 2024-11-18 DIAGNOSIS — M75.82 OTHER SHOULDER LESIONS, LEFT SHOULDER: ICD-10-CM

## 2024-11-18 DIAGNOSIS — M22.2X2 PATELLOFEMORAL DISORDERS, LEFT KNEE: ICD-10-CM

## 2024-11-18 PROCEDURE — 99214 OFFICE O/P EST MOD 30 MIN: CPT

## 2024-11-18 PROCEDURE — 73564 X-RAY EXAM KNEE 4 OR MORE: CPT | Mod: LT

## 2024-12-12 ENCOUNTER — NON-APPOINTMENT (OUTPATIENT)
Age: 49
End: 2024-12-12

## 2024-12-23 ENCOUNTER — NON-APPOINTMENT (OUTPATIENT)
Age: 49
End: 2024-12-23

## 2025-01-24 ENCOUNTER — LABORATORY RESULT (OUTPATIENT)
Age: 50
End: 2025-01-24

## 2025-01-29 ENCOUNTER — INPATIENT (INPATIENT)
Facility: HOSPITAL | Age: 50
LOS: 0 days | Discharge: HOME | DRG: 950 | End: 2025-01-30
Attending: ORTHOPAEDIC SURGERY | Admitting: ORTHOPAEDIC SURGERY
Payer: COMMERCIAL

## 2025-01-29 ENCOUNTER — APPOINTMENT (OUTPATIENT)
Dept: ORTHOPEDIC SURGERY | Facility: HOSPITAL | Age: 50
End: 2025-01-29

## 2025-01-29 VITALS
SYSTOLIC BLOOD PRESSURE: 112 MMHG | DIASTOLIC BLOOD PRESSURE: 76 MMHG | HEIGHT: 60 IN | HEART RATE: 93 BPM | TEMPERATURE: 97 F | RESPIRATION RATE: 16 BRPM | OXYGEN SATURATION: 98 % | WEIGHT: 143.08 LBS

## 2025-01-29 DIAGNOSIS — Z98.890 OTHER SPECIFIED POSTPROCEDURAL STATES: Chronic | ICD-10-CM

## 2025-01-29 DIAGNOSIS — Z98.891 HISTORY OF UTERINE SCAR FROM PREVIOUS SURGERY: Chronic | ICD-10-CM

## 2025-01-29 DIAGNOSIS — S89.91XD UNSPECIFIED INJURY OF RIGHT LOWER LEG, SUBSEQUENT ENCOUNTER: ICD-10-CM

## 2025-01-29 DIAGNOSIS — Z90.49 ACQUIRED ABSENCE OF OTHER SPECIFIED PARTS OF DIGESTIVE TRACT: Chronic | ICD-10-CM

## 2025-01-29 LAB
ANION GAP SERPL CALC-SCNC: 3 MMOL/L — LOW (ref 5–17)
BUN SERPL-MCNC: 15 MG/DL — SIGNIFICANT CHANGE UP (ref 7–23)
CALCIUM SERPL-MCNC: 9 MG/DL — SIGNIFICANT CHANGE UP (ref 8.5–10.1)
CHLORIDE SERPL-SCNC: 105 MMOL/L — SIGNIFICANT CHANGE UP (ref 96–108)
CO2 SERPL-SCNC: 26 MMOL/L — SIGNIFICANT CHANGE UP (ref 22–31)
CREAT SERPL-MCNC: 1.32 MG/DL — HIGH (ref 0.5–1.3)
EGFR: 50 ML/MIN/1.73M2 — LOW
GLUCOSE SERPL-MCNC: 105 MG/DL — HIGH (ref 70–99)
HCG UR QL: NEGATIVE — SIGNIFICANT CHANGE UP
HCT VFR BLD CALC: 35.5 % — SIGNIFICANT CHANGE UP (ref 34.5–45)
HGB BLD-MCNC: 11.3 G/DL — LOW (ref 11.5–15.5)
INR BLD: 0.89 RATIO — SIGNIFICANT CHANGE UP (ref 0.85–1.16)
MCHC RBC-ENTMCNC: 27.2 PG — SIGNIFICANT CHANGE UP (ref 27–34)
MCHC RBC-ENTMCNC: 31.8 G/DL — LOW (ref 32–36)
MCV RBC AUTO: 85.3 FL — SIGNIFICANT CHANGE UP (ref 80–100)
PLATELET # BLD AUTO: 439 K/UL — HIGH (ref 150–400)
POTASSIUM SERPL-MCNC: 4.2 MMOL/L — SIGNIFICANT CHANGE UP (ref 3.5–5.3)
POTASSIUM SERPL-SCNC: 4.2 MMOL/L — SIGNIFICANT CHANGE UP (ref 3.5–5.3)
PROTHROM AB SERPL-ACNC: 10.5 SEC — SIGNIFICANT CHANGE UP (ref 9.9–13.4)
RBC # BLD: 4.16 M/UL — SIGNIFICANT CHANGE UP (ref 3.8–5.2)
RBC # FLD: 15 % — HIGH (ref 10.3–14.5)
SODIUM SERPL-SCNC: 134 MMOL/L — LOW (ref 135–145)
WBC # BLD: 15.07 K/UL — HIGH (ref 3.8–10.5)
WBC # FLD AUTO: 15.07 K/UL — HIGH (ref 3.8–10.5)

## 2025-01-29 PROCEDURE — 85027 COMPLETE CBC AUTOMATED: CPT

## 2025-01-29 PROCEDURE — 97162 PT EVAL MOD COMPLEX 30 MIN: CPT | Mod: GP

## 2025-01-29 PROCEDURE — 80048 BASIC METABOLIC PNL TOTAL CA: CPT

## 2025-01-29 PROCEDURE — 97166 OT EVAL MOD COMPLEX 45 MIN: CPT | Mod: GO

## 2025-01-29 PROCEDURE — 36415 COLL VENOUS BLD VENIPUNCTURE: CPT

## 2025-01-29 PROCEDURE — 97116 GAIT TRAINING THERAPY: CPT | Mod: GP

## 2025-01-29 RX ORDER — POLYETHYLENE GLYCOL 3350 17 G/17G
17 POWDER, FOR SOLUTION ORAL AT BEDTIME
Refills: 0 | Status: DISCONTINUED | OUTPATIENT
Start: 2025-01-29 | End: 2025-01-30

## 2025-01-29 RX ORDER — FENTANYL CITRATE 50 UG/ML
50 INJECTION INTRAMUSCULAR; INTRAVENOUS
Refills: 0 | Status: DISCONTINUED | OUTPATIENT
Start: 2025-01-29 | End: 2025-01-29

## 2025-01-29 RX ORDER — CEFAZOLIN SODIUM IN 0.9 % NACL 2 G/10 ML
2000 SYRINGE (ML) INTRAVENOUS EVERY 8 HOURS
Refills: 0 | Status: DISCONTINUED | OUTPATIENT
Start: 2025-01-29 | End: 2025-01-29

## 2025-01-29 RX ORDER — HYDROMORPHONE HYDROCHLORIDE 4 MG/ML
0.5 INJECTION, SOLUTION INTRAMUSCULAR; INTRAVENOUS; SUBCUTANEOUS
Refills: 0 | Status: DISCONTINUED | OUTPATIENT
Start: 2025-01-29 | End: 2025-01-29

## 2025-01-29 RX ORDER — BACTERIOSTATIC SODIUM CHLORIDE 0.9 %
1000 VIAL (ML) INJECTION
Refills: 0 | Status: DISCONTINUED | OUTPATIENT
Start: 2025-01-30 | End: 2025-01-30

## 2025-01-29 RX ORDER — ACETAMINOPHEN 160 MG/5ML
650 SUSPENSION ORAL EVERY 6 HOURS
Refills: 0 | Status: DISCONTINUED | OUTPATIENT
Start: 2025-01-29 | End: 2025-01-30

## 2025-01-29 RX ORDER — SODIUM BICARBONATE 42 MG/ML
650 INJECTION, SOLUTION INTRAVENOUS
Refills: 0 | Status: DISCONTINUED | OUTPATIENT
Start: 2025-01-29 | End: 2025-01-30

## 2025-01-29 RX ORDER — ONDANSETRON 4 MG/1
4 TABLET, ORALLY DISINTEGRATING ORAL EVERY 6 HOURS
Refills: 0 | Status: DISCONTINUED | OUTPATIENT
Start: 2025-01-29 | End: 2025-01-30

## 2025-01-29 RX ORDER — ACETAMINOPHEN 160 MG/5ML
1000 SUSPENSION ORAL ONCE
Refills: 0 | Status: DISCONTINUED | OUTPATIENT
Start: 2025-01-29 | End: 2025-01-29

## 2025-01-29 RX ORDER — MAGNESIUM HYDROXIDE 400 MG/5ML
30 SUSPENSION, ORAL (FINAL DOSE FORM) ORAL DAILY
Refills: 0 | Status: DISCONTINUED | OUTPATIENT
Start: 2025-01-29 | End: 2025-01-30

## 2025-01-29 RX ORDER — OXYCODONE HYDROCHLORIDE 30 MG/1
5 TABLET ORAL EVERY 4 HOURS
Refills: 0 | Status: DISCONTINUED | OUTPATIENT
Start: 2025-01-29 | End: 2025-01-30

## 2025-01-29 RX ORDER — ONDANSETRON 4 MG/1
4 TABLET, ORALLY DISINTEGRATING ORAL ONCE
Refills: 0 | Status: DISCONTINUED | OUTPATIENT
Start: 2025-01-29 | End: 2025-01-29

## 2025-01-29 RX ORDER — OXYCODONE HYDROCHLORIDE 30 MG/1
10 TABLET ORAL EVERY 4 HOURS
Refills: 0 | Status: DISCONTINUED | OUTPATIENT
Start: 2025-01-29 | End: 2025-01-30

## 2025-01-29 RX ORDER — LEVOTHYROXINE SODIUM 25 UG/1
100 TABLET ORAL DAILY
Refills: 0 | Status: DISCONTINUED | OUTPATIENT
Start: 2025-01-29 | End: 2025-01-30

## 2025-01-29 RX ORDER — GABAPENTIN 800 MG/1
400 TABLET ORAL THREE TIMES A DAY
Refills: 0 | Status: DISCONTINUED | OUTPATIENT
Start: 2025-01-29 | End: 2025-01-30

## 2025-01-29 RX ORDER — SENNOSIDES 8.6 MG
2 TABLET ORAL AT BEDTIME
Refills: 0 | Status: DISCONTINUED | OUTPATIENT
Start: 2025-01-29 | End: 2025-01-30

## 2025-01-29 RX ORDER — ASPIRIN 81 MG/1
325 TABLET, COATED ORAL DAILY
Refills: 0 | Status: DISCONTINUED | OUTPATIENT
Start: 2025-01-30 | End: 2025-01-30

## 2025-01-29 RX ORDER — ATORVASTATIN CALCIUM 80 MG/1
20 TABLET, FILM COATED ORAL AT BEDTIME
Refills: 0 | Status: DISCONTINUED | OUTPATIENT
Start: 2025-01-29 | End: 2025-01-30

## 2025-01-29 RX ORDER — SODIUM CHLORIDE 9 G/ML
1000 INJECTION, SOLUTION INTRAVENOUS
Refills: 0 | Status: DISCONTINUED | OUTPATIENT
Start: 2025-01-29 | End: 2025-01-29

## 2025-01-29 RX ORDER — OXYCODONE HYDROCHLORIDE 30 MG/1
5 TABLET ORAL ONCE
Refills: 0 | Status: DISCONTINUED | OUTPATIENT
Start: 2025-01-29 | End: 2025-01-29

## 2025-01-29 RX ORDER — TRAMADOL HYDROCHLORIDE 100 MG/1
50 TABLET, EXTENDED RELEASE ORAL EVERY 4 HOURS
Refills: 0 | Status: DISCONTINUED | OUTPATIENT
Start: 2025-01-29 | End: 2025-01-30

## 2025-01-29 RX ORDER — PANTOPRAZOLE 20 MG/1
40 TABLET, DELAYED RELEASE ORAL
Refills: 0 | Status: DISCONTINUED | OUTPATIENT
Start: 2025-01-29 | End: 2025-01-30

## 2025-01-29 RX ORDER — CEFAZOLIN SODIUM IN 0.9 % NACL 2 G/10 ML
2000 SYRINGE (ML) INTRAVENOUS EVERY 8 HOURS
Refills: 0 | Status: COMPLETED | OUTPATIENT
Start: 2025-01-29 | End: 2025-01-30

## 2025-01-29 RX ADMIN — ATORVASTATIN CALCIUM 20 MILLIGRAM(S): 80 TABLET, FILM COATED ORAL at 22:36

## 2025-01-29 RX ADMIN — OXYCODONE HYDROCHLORIDE 5 MILLIGRAM(S): 30 TABLET ORAL at 22:43

## 2025-01-29 RX ADMIN — Medication 100 MILLIGRAM(S): at 22:42

## 2025-01-29 RX ADMIN — OXYCODONE HYDROCHLORIDE 5 MILLIGRAM(S): 30 TABLET ORAL at 17:11

## 2025-01-29 RX ADMIN — OXYCODONE HYDROCHLORIDE 5 MILLIGRAM(S): 30 TABLET ORAL at 23:13

## 2025-01-29 RX ADMIN — Medication 2000 MILLIGRAM(S): at 22:34

## 2025-01-29 RX ADMIN — SODIUM BICARBONATE 650 MILLIGRAM(S): 42 INJECTION, SOLUTION INTRAVENOUS at 22:44

## 2025-01-29 RX ADMIN — OXYCODONE HYDROCHLORIDE 5 MILLIGRAM(S): 30 TABLET ORAL at 16:41

## 2025-01-29 RX ADMIN — GABAPENTIN 400 MILLIGRAM(S): 800 TABLET ORAL at 22:44

## 2025-01-29 NOTE — DISCHARGE NOTE PROVIDER - NSDCMRMEDTOKEN_GEN_ALL_CORE_FT
atorvastatin 20 mg oral tablet: 1 tab(s) orally once a day (at bedtime)  hydrocortisone 5 mg oral tablet: 1 tab(s) orally once a day  Neurontin 400 mg oral capsule: 1 cap(s) orally 3 times a day  Protonix 40 mg oral delayed release tablet: 1 tab(s) orally once a day  sodium bicarbonate: 650 milligram(s) 2 times a day  Synthroid 100 mcg (0.1 mg) oral tablet: 1 tab(s) orally once a day  Zepbound Pen 10 mg/0.5 mL subcutaneous solution: 10 milligram(s) subcutaneously once a week   aspirin 325 mg oral tablet: 1 tab(s) orally once a day  atorvastatin 20 mg oral tablet: 1 tab(s) orally once a day (at bedtime)  Colace 100 mg oral capsule: 1 cap(s) orally every 8 hours as needed for  constipation  hydrocortisone 5 mg oral tablet: 1 tab(s) orally once a day  Neurontin 400 mg oral capsule: 1 cap(s) orally 3 times a day  ondansetron 4 mg oral tablet: 1 tab(s) orally every 8 hours as needed for  nausea  oxyCODONE 5 mg oral tablet: 1 tab(s) orally every 6 hours as needed for  severe pain MDD: 4  Protonix 40 mg oral delayed release tablet: 1 tab(s) orally once a day  sodium bicarbonate: 650 milligram(s) 2 times a day  Synthroid 100 mcg (0.1 mg) oral tablet: 1 tab(s) orally once a day  Zepbound Pen 10 mg/0.5 mL subcutaneous solution: 10 milligram(s) subcutaneously once a week

## 2025-01-29 NOTE — DISCHARGE NOTE PROVIDER - HOSPITAL COURSE
The patient is a 49year old female status post elective L ACL reconstruction after failing outpatient nonoperative conservative managment. The patient presented to [facility] after being medically cleared for an elective surgical procedure. The patient was taken to the operating room on the date mentioned above. Prophylactic antibiotics were started before the procedure and continued for 24 hours. There were no complications during the procedure and patient tolerated the procedure well. The patient was transferred to the recovery room in stable condition and subsquently to the surgical floor. The patient was placed on Aspirin for anticoagulation. All home medications were continued. The patient received physical therapy daily and daily labs were followed. The rest of the hospital stay was unremarkable The patient is a 49year old female status post elective R ACL reconstruction after failing outpatient nonoperative conservative management. The patient presented to [facility] after being medically cleared for an elective surgical procedure. The patient was taken to the operating room on the date mentioned above. Prophylactic antibiotics were started before the procedure and continued for 24 hours. There were no complications during the procedure and patient tolerated the procedure well. The patient was transferred to the recovery room in stable condition and subsequently to the surgical floor. The patient was placed on Aspirin for anticoagulation. All home medications were continued. The patient received physical therapy daily and daily labs were followed. The rest of the hospital stay was unremarkable

## 2025-01-29 NOTE — DISCHARGE NOTE PROVIDER - NSDCFUSCHEDAPPT_GEN_ALL_CORE_FT
Jesica Choudhary  [facility] [address]  Scheduled Appointment: 02/10/2025    Garcia Granger  [facility]  ORTHOSUR [address]  Scheduled Appointment: 02/11/2025    Garcia Granger  [facility]  ORTHOR [address]  Scheduled Appointment: 03/11/2025

## 2025-01-29 NOTE — ASU PREOP CHECKLIST - BSA (M2)
Patient Call    Who are you speaking with? [facility] lab    If it is not the patient, are they listed on an active communication consent form? N/A   What is the reason for this call? Critical result   Does this require a call back? Yes   If a call back is required, please list best call back number [phone]   If a call back is required, advise that a message will be forwarded to their care team and someone will return their call as soon as possible. Did you relay this information to the patient?  Yes 1.62

## 2025-01-29 NOTE — DISCHARGE NOTE PROVIDER - NSDCFUADDINST_GEN_ALL_CORE_FT
1. Weight bearing as tolerated with knee locked in extenstion  2. Take DVT prophylaxis as prescribed to prevent blood clots  3. Pain medication has been sent to your pharmacy, please pickup on your way home and take ONLY as prescribed for severe pain if needed  4. Keep incisions clean and dry, if dressing gets soiled cover with sterile gauze and tegaderm  5. Follow up with Dr. Granger in 2 weeks, call to schedule your appointment 1. Weight bearing as tolerated with knee locked in extension  2. Take DVT prophylaxis as prescribed to prevent blood clots  3. Pain medication has been sent to your pharmacy, please pickup on your way home and take ONLY as prescribed for severe pain if needed  4. Keep incisions clean and dry, if dressing gets soiled cover with sterile gauze and tegaderm  5. Follow up with Dr. Granger in 2 weeks, call to schedule your appointment

## 2025-01-29 NOTE — PROGRESS NOTE ADULT - SUBJECTIVE AND OBJECTIVE BOX
Postop Check    Patient tolerated the procedure well. Patient seen and examined at bedside.  No acute complaints at this time. Pain well controlled. Denies chest pain, shortness of breath, nausea or vomiting.     PE:  T(C): 36.5 (01-29-25 @ 20:17), Max: 36.5 (01-29-25 @ 20:17)  HR: 84 (01-29-25 @ 20:17) (77 - 93)  BP: 103/65 (01-29-25 @ 20:17) (82/56 - 112/76)  RR: 16 (01-29-25 @ 20:17) (12 - 19)  SpO2: 99% (01-29-25 @ 20:17) (94% - 99%)    General: NAD, resting comfortably in bed  RLE:   Dressing C/D/I, anthony in place  SCDs present bilaterally  Compartments soft and compressible  No calf tenderness bilaterally  +TA/EHL/FHL/GSC  SILT evelyn/saph/sp/dp/tib  palpable DP/PT    LABS:                        11.3   15.07 )-----------( 439      ( 29 Jan 2025 15:52 )             35.5     01-29    134[L]  |  105  |  15  ----------------------------<  105[H]  4.2   |  26  |  1.32[H]    Ca    9.0      29 Jan 2025 15:52      PT/INR - ( 29 Jan 2025 13:03 )   PT: 10.5 sec;   INR: 0.89 ratio             A/P:  49y F s/p R ACL repair POD 0  -PT/OT   -WBAT RLE, anthony LIE  -Pain Control  -DVT ppx:   -Continue perioperative abx x 24 hours  -FU AM Labs  -Rest, ice, compress and elevate the extremity as needed  -Incentive Spirometry  -Medical comanagement appreciated  -dispo: home tomorrow  -will discuss with Dr. Granger and advise with changes to plan

## 2025-01-29 NOTE — ASU PATIENT PROFILE, ADULT - NSCAFFEAMTFREQ_GEN_ALL_CORE_SD
26-year-old female PMH anxiety, depression, presents to the emergency department for right flank pain.  Patient states symptoms started 1 day ago, atraumatic.  She does note that she started working out on Thursday but did not have any discomfort after her exercise.  She states the pain is worse with movement and worse with deep inspiration.  She denies fever, chills, chest pain, shortness of breath, nausea, vomiting, diarrhea, or dysuria.  She tried taking Tylenol at home without any relief. Pt. is a 26 year old female history of anxiety and depression, presents with right flank pain. Pt. is a 26 year old female history of anxiety and depression, presents with right flank pain.  Pt. stating symptoms started yesterday.  Pt. denies trauma but worked out the previous day.  Pain with movement and deep inspiration.  Denies chest pain, urinary complaints.  Tylenol taken without relief.  Abbi Jewell PA-C Pt. feeling better after Toradol.  Still with pain.  Will add Valium.  Labs unremarkable.  Abbi Jewell PA-C Pt. feeling better after medications.  Labs unremarkable.  Pt. to follow with spine outpatient.  Return for worsening symptoms.  Abbi Jewell PA-C 3-4 cups/cans per day

## 2025-01-29 NOTE — DISCHARGE NOTE PROVIDER - CARE PROVIDER_API CALL
Garcia Granger  Orthopaedic Sports Medicine  [address], [address]  [place] [address]  Phone: [phone]  Fax: [phone]  Follow Up Time:

## 2025-01-29 NOTE — DISCHARGE NOTE PROVIDER - NSDCCPCAREPLAN_GEN_ALL_CORE_FT
PRINCIPAL DISCHARGE DIAGNOSIS  Diagnosis: Left ACL tear  Assessment and Plan of Treatment:      PRINCIPAL DISCHARGE DIAGNOSIS  Diagnosis: Right ACL tear  Assessment and Plan of Treatment:

## 2025-01-30 ENCOUNTER — TRANSCRIPTION ENCOUNTER (OUTPATIENT)
Age: 50
End: 2025-01-30

## 2025-01-30 ENCOUNTER — APPOINTMENT (OUTPATIENT)
Dept: ENDOCRINOLOGY | Facility: CLINIC | Age: 50
End: 2025-01-30

## 2025-01-30 VITALS
RESPIRATION RATE: 16 BRPM | OXYGEN SATURATION: 100 % | DIASTOLIC BLOOD PRESSURE: 62 MMHG | TEMPERATURE: 98 F | SYSTOLIC BLOOD PRESSURE: 105 MMHG | HEART RATE: 74 BPM

## 2025-01-30 LAB
ANION GAP SERPL CALC-SCNC: 5 MMOL/L — SIGNIFICANT CHANGE UP (ref 5–17)
BUN SERPL-MCNC: 12 MG/DL — SIGNIFICANT CHANGE UP (ref 7–23)
CALCIUM SERPL-MCNC: 8.8 MG/DL — SIGNIFICANT CHANGE UP (ref 8.5–10.1)
CHLORIDE SERPL-SCNC: 108 MMOL/L — SIGNIFICANT CHANGE UP (ref 96–108)
CO2 SERPL-SCNC: 25 MMOL/L — SIGNIFICANT CHANGE UP (ref 22–31)
CREAT SERPL-MCNC: 1.29 MG/DL — SIGNIFICANT CHANGE UP (ref 0.5–1.3)
EGFR: 52 ML/MIN/1.73M2 — LOW
GLUCOSE SERPL-MCNC: 103 MG/DL — HIGH (ref 70–99)
HCT VFR BLD CALC: 33.9 % — LOW (ref 34.5–45)
HGB BLD-MCNC: 10.7 G/DL — LOW (ref 11.5–15.5)
MCHC RBC-ENTMCNC: 27.2 PG — SIGNIFICANT CHANGE UP (ref 27–34)
MCHC RBC-ENTMCNC: 31.6 G/DL — LOW (ref 32–36)
MCV RBC AUTO: 86.3 FL — SIGNIFICANT CHANGE UP (ref 80–100)
PLATELET # BLD AUTO: 452 K/UL — HIGH (ref 150–400)
POTASSIUM SERPL-MCNC: 3.7 MMOL/L — SIGNIFICANT CHANGE UP (ref 3.5–5.3)
POTASSIUM SERPL-SCNC: 3.7 MMOL/L — SIGNIFICANT CHANGE UP (ref 3.5–5.3)
RBC # BLD: 3.93 M/UL — SIGNIFICANT CHANGE UP (ref 3.8–5.2)
RBC # FLD: 15.3 % — HIGH (ref 10.3–14.5)
SODIUM SERPL-SCNC: 138 MMOL/L — SIGNIFICANT CHANGE UP (ref 135–145)
WBC # BLD: 27.82 K/UL — HIGH (ref 3.8–10.5)
WBC # FLD AUTO: 27.82 K/UL — HIGH (ref 3.8–10.5)

## 2025-01-30 RX ORDER — ASPIRIN 81 MG/1
1 TABLET, COATED ORAL
Qty: 21 | Refills: 0
Start: 2025-01-30 | End: 2025-02-19

## 2025-01-30 RX ORDER — SODIUM BICARBONATE 42 MG/ML
650 INJECTION, SOLUTION INTRAVENOUS
Refills: 0 | DISCHARGE

## 2025-01-30 RX ORDER — ONDANSETRON 4 MG/1
1 TABLET, ORALLY DISINTEGRATING ORAL
Qty: 15 | Refills: 0
Start: 2025-01-30 | End: 2025-02-03

## 2025-01-30 RX ORDER — ATORVASTATIN CALCIUM 80 MG/1
1 TABLET, FILM COATED ORAL
Refills: 0 | DISCHARGE

## 2025-01-30 RX ORDER — LEVOTHYROXINE SODIUM 25 UG/1
1 TABLET ORAL
Refills: 0 | DISCHARGE

## 2025-01-30 RX ORDER — GABAPENTIN 800 MG/1
1 TABLET ORAL
Refills: 0 | DISCHARGE

## 2025-01-30 RX ORDER — OXYCODONE HYDROCHLORIDE 30 MG/1
1 TABLET ORAL
Qty: 20 | Refills: 0
Start: 2025-01-30 | End: 2025-02-03

## 2025-01-30 RX ORDER — PANTOPRAZOLE 20 MG/1
1 TABLET, DELAYED RELEASE ORAL
Refills: 0 | DISCHARGE

## 2025-01-30 RX ORDER — DOCUSATE SODIUM 100 MG
1 CAPSULE ORAL
Qty: 15 | Refills: 0
Start: 2025-01-30 | End: 2025-02-03

## 2025-01-30 RX ADMIN — ASPIRIN 325 MILLIGRAM(S): 81 TABLET, COATED ORAL at 08:50

## 2025-01-30 RX ADMIN — LEVOTHYROXINE SODIUM 100 MICROGRAM(S): 25 TABLET ORAL at 06:43

## 2025-01-30 RX ADMIN — GABAPENTIN 400 MILLIGRAM(S): 800 TABLET ORAL at 13:31

## 2025-01-30 RX ADMIN — ACETAMINOPHEN 650 MILLIGRAM(S): 160 SUSPENSION ORAL at 13:30

## 2025-01-30 RX ADMIN — GABAPENTIN 400 MILLIGRAM(S): 800 TABLET ORAL at 06:41

## 2025-01-30 RX ADMIN — Medication 2000 MILLIGRAM(S): at 06:41

## 2025-01-30 RX ADMIN — ACETAMINOPHEN 650 MILLIGRAM(S): 160 SUSPENSION ORAL at 06:40

## 2025-01-30 RX ADMIN — SODIUM BICARBONATE 650 MILLIGRAM(S): 42 INJECTION, SOLUTION INTRAVENOUS at 08:48

## 2025-01-30 RX ADMIN — Medication 10 MILLIGRAM(S): at 13:44

## 2025-01-30 RX ADMIN — Medication 30 MILLIGRAM(S): at 06:40

## 2025-01-30 RX ADMIN — OXYCODONE HYDROCHLORIDE 10 MILLIGRAM(S): 30 TABLET ORAL at 08:47

## 2025-01-30 RX ADMIN — OXYCODONE HYDROCHLORIDE 10 MILLIGRAM(S): 30 TABLET ORAL at 09:45

## 2025-01-30 NOTE — OCCUPATIONAL THERAPY INITIAL EVALUATION ADULT - LIVES WITH, PROFILE
Pt lives in a home w/ her , son and Father. She can stay on the first floor while she recovers, has 3STE , standard toilet, tub shower, IND prior/spouse

## 2025-01-30 NOTE — PROGRESS NOTE ADULT - SUBJECTIVE AND OBJECTIVE BOX
Patient seen and examined at bedside.  No acute complaints at this time. Pain well controlled, has not required pain medications. Denies chest pain, shortness of breath, nausea or vomiting.       VITAL SIGNS:  T(C): 36.6 (01-30-25 @ 04:12), Max: 36.6 (01-30-25 @ 04:12)  HR: 91 (01-30-25 @ 04:12) (77 - 93)  BP: 95/54 (01-30-25 @ 04:12) (82/56 - 112/76)  RR: 16 (01-30-25 @ 04:12) (12 - 19)  SpO2: 95% (01-30-25 @ 04:12) (94% - 99%)  General: NAD, resting comfortably in bed  RLE:   Dressing C/D/I, anthony in place  SCDs present bilaterally  Compartments soft and compressible  No calf tenderness bilaterally  +TA/EHL/FHL/GSC  SILT evelyn/saph/sp/dp/tib  palpable DP/PT    LABS:                        11.3   15.07 )-----------( 439      ( 29 Jan 2025 15:52 )             35.5     01-29    134[L]  |  105  |  15  ----------------------------<  105[H]  4.2   |  26  |  1.32[H]    Ca    9.0      29 Jan 2025 15:52      PT/INR - ( 29 Jan 2025 13:03 )   PT: 10.5 sec;   INR: 0.89 ratio           Urinalysis Basic - ( 29 Jan 2025 15:52 )    Color: x / Appearance: x / SG: x / pH: x  Gluc: 105 mg/dL / Ketone: x  / Bili: x / Urobili: x   Blood: x / Protein: x / Nitrite: x   Leuk Esterase: x / RBC: x / WBC x   Sq Epi: x / Non Sq Epi: x / Bacteria: x        A/P:  49y F s/p R ACL repair POD 1. Stable  -PT/OT   -WBAT RLE, anthony LIE  -Pain Control  -DVT ppx:   -Continue perioperative abx x 24 hours  -FU AM Labs  -Rest, ice, compress and elevate the extremity as needed  -Incentive Spirometry  -Medical comanagement appreciated  -DC home today  -will discuss with Dr. Granger and advise with changes to plan

## 2025-01-30 NOTE — PHYSICAL THERAPY INITIAL EVALUATION ADULT - SITTING BALANCE: STATIC
PROVIDER:[TOKEN:[[id number]],SCHEDULEDAPPT:[10/09/2024],SCHEDULEDAPPTTIME:[09:15 AM],ESTABLISHEDPATIENT:[T]]
good balance

## 2025-01-30 NOTE — PHYSICAL THERAPY INITIAL EVALUATION ADULT - MODALITIES TREATMENT COMMENTS
Pt OOB in chair, R LE elevated in brace, NAD, denies dizziness, /77, pain 5/10 RN aware, pt awaiting home discharge. CBIR, RN aware, +alarm,

## 2025-01-30 NOTE — DISCHARGE NOTE NURSING/CASE MANAGEMENT/SOCIAL WORK - NSDCPEFALRISK_GEN_ALL_CORE
For information on Fall & Injury Prevention, visit: [email or web address] OR  [email or web address] OR  https://www.cdc.gov/steadi/patient.html

## 2025-01-30 NOTE — PHYSICAL THERAPY INITIAL EVALUATION ADULT - NSACTIVITYREC_GEN_A_PT
45 min eval, cleared for transfers, gait, stairs, car, w/ ind w/ RW demonstrated WBAT R LE in brace. No further Acute Care PT needed. OPPT when MD clears, Rn and CM aware.

## 2025-01-30 NOTE — PHYSICAL THERAPY INITIAL EVALUATION ADULT - ACTIVE RANGE OF MOTION EXAMINATION, REHAB EVAL
except R knee/hip NT/bilateral upper extremity Active ROM was WFL (within functional limits)/bilateral  lower extremity Active ROM was WFL (within functional limits)

## 2025-01-30 NOTE — DISCHARGE NOTE NURSING/CASE MANAGEMENT/SOCIAL WORK - PATIENT PORTAL LINK FT
You can access the FollowMyHealth Patient Portal offered by [facility] by registering at the following website: [email or web address]. By joining [employer]’s FollowMyHealth portal, you will also be able to view your health information using other applications (apps) compatible with our system.

## 2025-01-30 NOTE — OCCUPATIONAL THERAPY INITIAL EVALUATION ADULT - NSACTIVITYREC_GEN_A_OT
Patient educated on how to participate in dressing tasks, grooming tasks, toileting tasks, bed mobility and  all functional transfers while abiding by precautions. Handout provided to promote carryover. Pt educated on DME and AE throughout session and how to self purchase- commode and hipkit. Pt participated in shower transfer with supervision and educated on bathing techniques within precautions

## 2025-01-30 NOTE — OCCUPATIONAL THERAPY INITIAL EVALUATION ADULT - ADL RETRAINING, OT EVAL
Patient will participate in lower body dressing with MOD I    in 2 weeks     Patient will participate in toileting with MOD I     in 2 weeks  Patient will participate in shower transfer with MOD I   in 2 weeks

## 2025-01-30 NOTE — PHYSICAL THERAPY INITIAL EVALUATION ADULT - LIVES WITH, PROFILE
Pt lives in a home w/ her , son and Father. She can stay on the first floor while she recovers, has 3STE ./spouse

## 2025-01-30 NOTE — DISCHARGE NOTE NURSING/CASE MANAGEMENT/SOCIAL WORK - FINANCIAL ASSISTANCE
[facility] provides services at a reduced cost to those who are determined to be eligible through [facility]’s financial assistance program. Information regarding [facility]’s financial assistance program can be found by going to [email or web address] or by calling [phone].

## 2025-02-05 DIAGNOSIS — Z79.899 OTHER LONG TERM (CURRENT) DRUG THERAPY: ICD-10-CM

## 2025-02-05 DIAGNOSIS — M62.838 OTHER MUSCLE SPASM: ICD-10-CM

## 2025-02-05 DIAGNOSIS — N18.30 CHRONIC KIDNEY DISEASE, STAGE 3 UNSPECIFIED: ICD-10-CM

## 2025-02-05 DIAGNOSIS — Z79.890 HORMONE REPLACEMENT THERAPY: ICD-10-CM

## 2025-02-05 DIAGNOSIS — Z91.09 OTHER ALLERGY STATUS, OTHER THAN TO DRUGS AND BIOLOGICAL SUBSTANCES: ICD-10-CM
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
## 2025-02-05 DIAGNOSIS — R73.03 PREDIABETES: ICD-10-CM
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
## 2025-02-05 DIAGNOSIS — Z79.4 LONG TERM (CURRENT) USE OF INSULIN: ICD-10-CM

## 2025-02-05 DIAGNOSIS — E78.00 PURE HYPERCHOLESTEROLEMIA, UNSPECIFIED: ICD-10-CM

## 2025-02-05 DIAGNOSIS — E03.9 HYPOTHYROIDISM, UNSPECIFIED: ICD-10-CM

## 2025-02-05 DIAGNOSIS — M81.0 AGE-RELATED OSTEOPOROSIS WITHOUT CURRENT PATHOLOGICAL FRACTURE: ICD-10-CM

## 2025-02-05 DIAGNOSIS — S83.511A SPRAIN OF ANTERIOR CRUCIATE LIGAMENT OF RIGHT KNEE, INITIAL ENCOUNTER: ICD-10-CM

## 2025-02-10 ENCOUNTER — APPOINTMENT (OUTPATIENT)
Dept: ENDOCRINOLOGY | Facility: CLINIC | Age: 50
End: 2025-02-10
Payer: COMMERCIAL

## 2025-02-10 DIAGNOSIS — E27.40 UNSPECIFIED ADRENOCORTICAL INSUFFICIENCY: ICD-10-CM

## 2025-02-10 DIAGNOSIS — E78.00 PURE HYPERCHOLESTEROLEMIA, UNSPECIFIED: ICD-10-CM

## 2025-02-10 DIAGNOSIS — R73.03 PREDIABETES.: ICD-10-CM

## 2025-02-10 DIAGNOSIS — E03.9 HYPOTHYROIDISM, UNSPECIFIED: ICD-10-CM

## 2025-02-10 PROCEDURE — 99214 OFFICE O/P EST MOD 30 MIN: CPT | Mod: 95

## 2025-02-11 ENCOUNTER — APPOINTMENT (OUTPATIENT)
Dept: ORTHOPEDIC SURGERY | Facility: CLINIC | Age: 50
End: 2025-02-11
Payer: COMMERCIAL

## 2025-02-11 DIAGNOSIS — S89.91XD UNSPECIFIED INJURY OF RIGHT LOWER LEG, SUBSEQUENT ENCOUNTER: ICD-10-CM

## 2025-02-11 PROCEDURE — 99024 POSTOP FOLLOW-UP VISIT: CPT

## 2025-02-11 PROCEDURE — 73560 X-RAY EXAM OF KNEE 1 OR 2: CPT | Mod: RT

## 2025-02-15 ENCOUNTER — NON-APPOINTMENT (OUTPATIENT)
Age: 50
End: 2025-02-15

## 2025-02-24 ENCOUNTER — RESULT REVIEW (OUTPATIENT)
Age: 50
End: 2025-02-24

## 2025-02-24 ENCOUNTER — APPOINTMENT (OUTPATIENT)
Dept: ULTRASOUND IMAGING | Facility: CLINIC | Age: 50
End: 2025-02-24
Payer: COMMERCIAL

## 2025-02-24 ENCOUNTER — OUTPATIENT (OUTPATIENT)
Dept: OUTPATIENT SERVICES | Facility: HOSPITAL | Age: 50
LOS: 1 days | End: 2025-02-24
Payer: COMMERCIAL

## 2025-02-24 DIAGNOSIS — Z98.891 HISTORY OF UTERINE SCAR FROM PREVIOUS SURGERY: Chronic | ICD-10-CM

## 2025-02-24 DIAGNOSIS — Z90.49 ACQUIRED ABSENCE OF OTHER SPECIFIED PARTS OF DIGESTIVE TRACT: Chronic | ICD-10-CM

## 2025-02-24 DIAGNOSIS — Z00.8 ENCOUNTER FOR OTHER GENERAL EXAMINATION: ICD-10-CM

## 2025-02-24 PROCEDURE — 93971 EXTREMITY STUDY: CPT | Mod: 26,RT

## 2025-02-24 PROCEDURE — 93971 EXTREMITY STUDY: CPT

## 2025-02-25 PROBLEM — Z86.39 PERSONAL HISTORY OF OTHER ENDOCRINE, NUTRITIONAL AND METABOLIC DISEASE: Chronic | Status: ACTIVE | Noted: 2025-01-28

## 2025-02-25 PROBLEM — D64.9 ANEMIA, UNSPECIFIED: Chronic | Status: ACTIVE | Noted: 2025-01-28

## 2025-03-11 ENCOUNTER — APPOINTMENT (OUTPATIENT)
Dept: ORTHOPEDIC SURGERY | Facility: CLINIC | Age: 50
End: 2025-03-11
Payer: COMMERCIAL

## 2025-03-11 DIAGNOSIS — S89.91XD UNSPECIFIED INJURY OF RIGHT LOWER LEG, SUBSEQUENT ENCOUNTER: ICD-10-CM

## 2025-03-11 PROBLEM — Z86.19 PERSONAL HISTORY OF OTHER INFECTIOUS AND PARASITIC DISEASES: Chronic | Status: ACTIVE | Noted: 2025-01-28

## 2025-03-11 PROBLEM — E04.2 NONTOXIC MULTINODULAR GOITER: Chronic | Status: ACTIVE | Noted: 2025-01-28

## 2025-03-11 PROBLEM — K85.10 BILIARY ACUTE PANCREATITIS WITHOUT NECROSIS OR INFECTION: Chronic | Status: ACTIVE | Noted: 2025-01-28

## 2025-03-11 PROBLEM — K29.70 GASTRITIS, UNSPECIFIED, WITHOUT BLEEDING: Chronic | Status: ACTIVE | Noted: 2025-01-28

## 2025-03-11 PROCEDURE — 99024 POSTOP FOLLOW-UP VISIT: CPT

## 2025-03-14 ENCOUNTER — NON-APPOINTMENT (OUTPATIENT)
Age: 50
End: 2025-03-14

## 2025-04-09 ENCOUNTER — RX RENEWAL (OUTPATIENT)
Age: 50
End: 2025-04-09

## 2025-04-10 ENCOUNTER — APPOINTMENT (OUTPATIENT)
Dept: ORTHOPEDIC SURGERY | Facility: CLINIC | Age: 50
End: 2025-04-10
Payer: COMMERCIAL

## 2025-04-10 PROCEDURE — 99212 OFFICE O/P EST SF 10 MIN: CPT | Mod: 24

## 2025-04-29 ENCOUNTER — NON-APPOINTMENT (OUTPATIENT)
Age: 50
End: 2025-04-29

## 2025-05-10 ENCOUNTER — NON-APPOINTMENT (OUTPATIENT)
Age: 50
End: 2025-05-10

## 2025-05-12 ENCOUNTER — RX RENEWAL (OUTPATIENT)
Age: 50
End: 2025-05-12

## 2025-05-15 ENCOUNTER — APPOINTMENT (OUTPATIENT)
Dept: ENDOCRINOLOGY | Facility: CLINIC | Age: 50
End: 2025-05-15
Payer: COMMERCIAL

## 2025-05-15 VITALS
DIASTOLIC BLOOD PRESSURE: 87 MMHG | BODY MASS INDEX: 27 KG/M2 | HEART RATE: 81 BPM | WEIGHT: 143 LBS | HEIGHT: 61 IN | SYSTOLIC BLOOD PRESSURE: 120 MMHG | OXYGEN SATURATION: 99 %

## 2025-05-15 DIAGNOSIS — Z13.820 ENCOUNTER FOR SCREENING FOR OSTEOPOROSIS: ICD-10-CM

## 2025-05-15 DIAGNOSIS — E27.40 UNSPECIFIED ADRENOCORTICAL INSUFFICIENCY: ICD-10-CM

## 2025-05-15 DIAGNOSIS — R73.03 PREDIABETES.: ICD-10-CM

## 2025-05-15 DIAGNOSIS — E78.00 PURE HYPERCHOLESTEROLEMIA, UNSPECIFIED: ICD-10-CM

## 2025-05-15 DIAGNOSIS — E66.9 OBESITY, UNSPECIFIED: ICD-10-CM

## 2025-05-15 DIAGNOSIS — E03.9 HYPOTHYROIDISM, UNSPECIFIED: ICD-10-CM

## 2025-05-15 PROCEDURE — 99214 OFFICE O/P EST MOD 30 MIN: CPT

## 2025-05-15 RX ORDER — TIRZEPATIDE 5 MG/.5ML
5 INJECTION, SOLUTION SUBCUTANEOUS
Qty: 4 | Refills: 2 | Status: ACTIVE | COMMUNITY
Start: 2025-05-15 | End: 1900-01-01

## 2025-05-16 RX ORDER — TIRZEPATIDE 2.5 MG/.5ML
2.5 INJECTION, SOLUTION SUBCUTANEOUS
Qty: 1 | Refills: 0 | Status: ACTIVE | COMMUNITY
Start: 2025-05-15

## 2025-06-26 ENCOUNTER — APPOINTMENT (OUTPATIENT)
Dept: ORTHOPEDIC SURGERY | Facility: CLINIC | Age: 50
End: 2025-06-26
Payer: COMMERCIAL

## 2025-06-26 PROCEDURE — 99214 OFFICE O/P EST MOD 30 MIN: CPT

## 2025-06-27 ENCOUNTER — RX RENEWAL (OUTPATIENT)
Age: 50
End: 2025-06-27

## 2025-07-14 ENCOUNTER — NON-APPOINTMENT (OUTPATIENT)
Age: 50
End: 2025-07-14

## 2025-07-22 ENCOUNTER — APPOINTMENT (OUTPATIENT)
Dept: VASCULAR SURGERY | Facility: CLINIC | Age: 50
End: 2025-07-22
Payer: COMMERCIAL

## 2025-07-22 ENCOUNTER — APPOINTMENT (OUTPATIENT)
Dept: MAMMOGRAPHY | Facility: CLINIC | Age: 50
End: 2025-07-22
Payer: COMMERCIAL

## 2025-07-22 ENCOUNTER — APPOINTMENT (OUTPATIENT)
Dept: VASCULAR SURGERY | Facility: CLINIC | Age: 50
End: 2025-07-22

## 2025-07-22 ENCOUNTER — NON-APPOINTMENT (OUTPATIENT)
Age: 50
End: 2025-07-22

## 2025-07-22 ENCOUNTER — OUTPATIENT (OUTPATIENT)
Dept: OUTPATIENT SERVICES | Facility: HOSPITAL | Age: 50
LOS: 1 days | End: 2025-07-22
Payer: COMMERCIAL

## 2025-07-22 VITALS
DIASTOLIC BLOOD PRESSURE: 82 MMHG | RESPIRATION RATE: 16 BRPM | HEART RATE: 81 BPM | OXYGEN SATURATION: 95 % | BODY MASS INDEX: 26.11 KG/M2 | WEIGHT: 133 LBS | HEIGHT: 60 IN | SYSTOLIC BLOOD PRESSURE: 108 MMHG | TEMPERATURE: 97.7 F

## 2025-07-22 DIAGNOSIS — Z98.890 OTHER SPECIFIED POSTPROCEDURAL STATES: Chronic | ICD-10-CM

## 2025-07-22 DIAGNOSIS — Z98.891 HISTORY OF UTERINE SCAR FROM PREVIOUS SURGERY: Chronic | ICD-10-CM

## 2025-07-22 DIAGNOSIS — Z12.31 ENCOUNTER FOR SCREENING MAMMOGRAM FOR MALIGNANT NEOPLASM OF BREAST: ICD-10-CM

## 2025-07-22 DIAGNOSIS — M79.89 OTHER SPECIFIED SOFT TISSUE DISORDERS: ICD-10-CM

## 2025-07-22 DIAGNOSIS — Z90.49 ACQUIRED ABSENCE OF OTHER SPECIFIED PARTS OF DIGESTIVE TRACT: Chronic | ICD-10-CM

## 2025-07-22 PROCEDURE — 77063 BREAST TOMOSYNTHESIS BI: CPT

## 2025-07-22 PROCEDURE — 77063 BREAST TOMOSYNTHESIS BI: CPT | Mod: 26

## 2025-07-22 PROCEDURE — 77067 SCR MAMMO BI INCL CAD: CPT | Mod: 26

## 2025-07-22 PROCEDURE — 99203 OFFICE O/P NEW LOW 30 MIN: CPT

## 2025-07-22 PROCEDURE — 93971 EXTREMITY STUDY: CPT

## 2025-07-22 PROCEDURE — 77067 SCR MAMMO BI INCL CAD: CPT

## 2025-07-22 RX ORDER — MULTIVIT WITH MIN/ALA/HERBS 50 MG
TABLET ORAL
Refills: 0 | Status: ACTIVE | COMMUNITY

## 2025-07-22 RX ORDER — GLUC/MSM/COLGN2/HYAL/ANTIARTH3 375-375-20
TABLET ORAL
Refills: 0 | Status: ACTIVE | COMMUNITY

## 2025-08-06 NOTE — ASU PATIENT PROFILE, ADULT - PRO ARRIVE FROM
Continue same medications/treatment.  Patient educated on proper medication use.  Patient educated on risk factor modification.  Please bring any lab results from other providers/physicians to your next appointment.    Please bring all medicines, vitamins, and herbal supplements with you when you come to the office.    Prescriptions will not be filled unless you are compliant with your follow up appointments or have a follow up appointment scheduled as per instruction of your physician. Refills should be requested at the time of your visit.    Follow up with our physician assistant, Marine, in 6 months    Graciela PRESTON RN, AM SCRIBING FOR, AND IN THE PRESENCE OF DR. GUILLERMO PERALTA MD     home